# Patient Record
Sex: FEMALE | Race: BLACK OR AFRICAN AMERICAN | NOT HISPANIC OR LATINO
[De-identification: names, ages, dates, MRNs, and addresses within clinical notes are randomized per-mention and may not be internally consistent; named-entity substitution may affect disease eponyms.]

---

## 2021-10-18 ENCOUNTER — APPOINTMENT (OUTPATIENT)
Dept: PULMONOLOGY | Facility: CLINIC | Age: 71
End: 2021-10-18
Payer: MEDICARE

## 2021-10-18 VITALS
DIASTOLIC BLOOD PRESSURE: 62 MMHG | SYSTOLIC BLOOD PRESSURE: 110 MMHG | HEIGHT: 63.5 IN | HEART RATE: 86 BPM | OXYGEN SATURATION: 98 % | TEMPERATURE: 98.5 F

## 2021-10-18 DIAGNOSIS — R73.03 PREDIABETES.: ICD-10-CM

## 2021-10-18 DIAGNOSIS — Z80.1 FAMILY HISTORY OF MALIGNANT NEOPLASM OF TRACHEA, BRONCHUS AND LUNG: ICD-10-CM

## 2021-10-18 DIAGNOSIS — Z86.39 PERSONAL HISTORY OF OTHER ENDOCRINE, NUTRITIONAL AND METABOLIC DISEASE: ICD-10-CM

## 2021-10-18 PROCEDURE — 71046 X-RAY EXAM CHEST 2 VIEWS: CPT

## 2021-10-18 PROCEDURE — 99204 OFFICE O/P NEW MOD 45 MIN: CPT | Mod: 25

## 2021-10-18 RX ORDER — LEVOMEFOLATE CALCIUM 15 MG
15 TABLET ORAL
Refills: 0 | Status: ACTIVE | COMMUNITY

## 2021-10-18 RX ORDER — LEVOTHYROXINE SODIUM 137 UG/1
TABLET ORAL
Refills: 0 | Status: ACTIVE | COMMUNITY

## 2021-10-18 RX ORDER — ELECTROLYTES/DEXTROSE
SOLUTION, ORAL ORAL
Refills: 0 | Status: ACTIVE | COMMUNITY

## 2021-10-18 RX ORDER — ZOLPIDEM TARTRATE 5 MG/1
TABLET, FILM COATED ORAL
Refills: 0 | Status: ACTIVE | COMMUNITY

## 2021-10-18 RX ORDER — LIOTHYRONINE SODIUM 50 UG/1
TABLET ORAL
Refills: 0 | Status: ACTIVE | COMMUNITY

## 2021-10-18 RX ORDER — TOPIRAMATE 50 MG/1
TABLET, FILM COATED ORAL
Refills: 0 | Status: ACTIVE | COMMUNITY

## 2021-10-18 RX ORDER — METHYLPHENIDATE HYDROCHLORIDE 27 MG/1
27 TABLET, EXTENDED RELEASE ORAL
Refills: 0 | Status: ACTIVE | COMMUNITY

## 2021-10-18 RX ORDER — CLONAZEPAM 2 MG/1
TABLET ORAL
Refills: 0 | Status: ACTIVE | COMMUNITY

## 2021-10-18 RX ORDER — GABAPENTIN 300 MG/1
300 CAPSULE ORAL
Refills: 0 | Status: ACTIVE | COMMUNITY

## 2021-10-18 RX ORDER — QUETIAPINE 400 MG/1
TABLET, FILM COATED ORAL
Refills: 0 | Status: ACTIVE | COMMUNITY

## 2021-10-18 RX ORDER — LITHIUM CARBONATE 450 MG/1
TABLET ORAL
Refills: 0 | Status: ACTIVE | COMMUNITY

## 2021-10-18 RX ORDER — VENLAFAXINE HCL 50 MG
TABLET ORAL
Refills: 0 | Status: ACTIVE | COMMUNITY

## 2021-10-18 RX ORDER — METFORMIN HYDROCHLORIDE 500 MG/1
500 TABLET, COATED ORAL
Refills: 0 | Status: ACTIVE | COMMUNITY

## 2021-10-18 RX ORDER — BUPROPION HYDROCHLORIDE 100 MG/1
TABLET, FILM COATED ORAL
Refills: 0 | Status: ACTIVE | COMMUNITY

## 2021-10-25 ENCOUNTER — APPOINTMENT (OUTPATIENT)
Dept: PULMONOLOGY | Facility: CLINIC | Age: 71
End: 2021-10-25
Payer: MEDICARE

## 2021-10-25 DIAGNOSIS — R06.00 DYSPNEA, UNSPECIFIED: ICD-10-CM

## 2021-10-25 PROCEDURE — 94060 EVALUATION OF WHEEZING: CPT

## 2021-10-25 PROCEDURE — 94727 GAS DIL/WSHOT DETER LNG VOL: CPT

## 2021-10-25 PROCEDURE — 94729 DIFFUSING CAPACITY: CPT

## 2021-10-26 LAB
ALBUMIN SERPL ELPH-MCNC: 4.2 G/DL
ALP BLD-CCNC: 116 U/L
ALT SERPL-CCNC: 18 U/L
ANION GAP SERPL CALC-SCNC: 20 MMOL/L
AST SERPL-CCNC: 29 U/L
BASOPHILS # BLD AUTO: 0.02 K/UL
BASOPHILS NFR BLD AUTO: 0.2 %
BILIRUB SERPL-MCNC: 0.2 MG/DL
BUN SERPL-MCNC: 15 MG/DL
CALCIUM SERPL-MCNC: 9 MG/DL
CHLORIDE SERPL-SCNC: 106 MMOL/L
CO2 SERPL-SCNC: 14 MMOL/L
CREAT SERPL-MCNC: 0.68 MG/DL
EOSINOPHIL # BLD AUTO: 0.06 K/UL
EOSINOPHIL NFR BLD AUTO: 0.7 %
GLUCOSE SERPL-MCNC: 61 MG/DL
HCT VFR BLD CALC: 27.6 %
HGB BLD-MCNC: 6.9 G/DL
IMM GRANULOCYTES NFR BLD AUTO: 0.2 %
LYMPHOCYTES # BLD AUTO: 1.55 K/UL
LYMPHOCYTES NFR BLD AUTO: 19.2 %
MAN DIFF?: NORMAL
MCHC RBC-ENTMCNC: 18.4 PG
MCHC RBC-ENTMCNC: 25 GM/DL
MCV RBC AUTO: 73.6 FL
MONOCYTES # BLD AUTO: 0.64 K/UL
MONOCYTES NFR BLD AUTO: 7.9 %
NEUTROPHILS # BLD AUTO: 5.78 K/UL
NEUTROPHILS NFR BLD AUTO: 71.8 %
PLATELET # BLD AUTO: 527 K/UL
POTASSIUM SERPL-SCNC: 4.6 MMOL/L
PROT SERPL-MCNC: 6.9 G/DL
RBC # BLD: 3.75 M/UL
RBC # FLD: 20.6 %
SODIUM SERPL-SCNC: 140 MMOL/L
WBC # FLD AUTO: 8.07 K/UL

## 2021-10-27 ENCOUNTER — APPOINTMENT (OUTPATIENT)
Dept: CT IMAGING | Facility: HOSPITAL | Age: 71
End: 2021-10-27

## 2021-10-27 ENCOUNTER — OUTPATIENT (OUTPATIENT)
Dept: OUTPATIENT SERVICES | Facility: HOSPITAL | Age: 71
LOS: 1 days | End: 2021-10-27
Payer: MEDICARE

## 2021-10-27 ENCOUNTER — RESULT REVIEW (OUTPATIENT)
Age: 71
End: 2021-10-27

## 2021-10-27 PROCEDURE — 71250 CT THORAX DX C-: CPT | Mod: 26,MH

## 2021-10-27 PROCEDURE — 71250 CT THORAX DX C-: CPT

## 2021-11-04 NOTE — PHYSICAL EXAM
[Normal Rate/Rhythm] : normal rate/rhythm [Normal S1, S2] : normal s1, s2 [No Resp Distress] : no resp distress [Clear to Auscultation Bilaterally] : clear to auscultation bilaterally [No Clubbing] : no clubbing [TextBox_2] : sitting on rollator [TextBox_54] : regular w 3/6 SM throughout [TextBox_125] : LLE is severely excoriated and bloody [TextBox_140] : anxious, crying

## 2021-11-04 NOTE — HISTORY OF PRESENT ILLNESS
[TextBox_4] : 10/18/2021: Asked to evaluate patient by Dr Micky Ramesh for dyspnea. Here w a caretaker. Had a R THR  and readmitted for an infection requiring re-op. Course also c/b AF and started on Eliquis. She's been dyspneic since the surgery and never before. Was never a smoker, no history of lung disease. Mother smoked 3-4ppd and  of lung cancer. Saw Dr Gee Clarke, cardiology at [facility], and found that her heart is fine. Has had not lung workup. Many surgeries last 2 years, R TKR, L THR, L femur fx, and now R THR. So basically an invalid during this time - but reports that she was independent and could walk 5-6 blocks. Since this last surgery hasn't been able to be very active. This time says she can't do the walking because she can't breathe. No cough or wheeze. No chest pain. Sometimes both legs hurt. L leg is swollen. Sees Dr Selena Pearce (vascular) at [facility] who wraps her leg. Sees her weekly. Did not have Covid, is vaccinated. Tay Pink NP is her primary care practitioner. Anxiety, depression, skin picking.\par

## 2021-11-04 NOTE — CONSULT LETTER
[Dear  ___] : Dear ~YESENIA, [Courtesy Letter:] : I had the pleasure of seeing your patient, [unfilled], in my office today. [Please see my note below.] : Please see my note below. [Consult Closing:] : Thank you very much for allowing me to participate in the care of this patient.  If you have any questions, please do not hesitate to contact me. [Sincerely,] : Sincerely, [DrSravan  ___] : Dr. DICKINSON [DrSravan ___] : Dr. DICKINSON [FreeTextEntry2] : Tay Pink NP\par [facility] Call[facility]\par VIA FAX: [phone] [FreeTextEntry3] : Beba Wyatt MD, FCCP\par

## 2021-11-04 NOTE — ASSESSMENT
[FreeTextEntry1] : Data reviewed:\par \par PA/lat CXR in office 10/18/2021 : clear lungs, no effusion\par \par Impression:\par Dyspnea after R THR\par Debility\par Anxiety\par \par Plan:\par She is fully anticoagulated on Eliquis, so VTE is an unlikely explanation for her symptoms. She had AF (is regular on exam today) and has a loud murmur. She reports being told by cardiology that her heart is "fine." I wonder if this is exactly accurate. She says she's had basic labs checked though she can't remember where. It may be the case that she is simply profoundly deconditioned but she is tearful at this idea and is sure that "something is wrong." I will bring her back for a full PFT, but I'm doubtful this will reveal a treatable cause of her dyspnea.\par --\par PFT 10/25/21: normal kiran and volumes, DLCO 53%\par She has no more information than last time and again is tearful and anxious. I will go ahead and check labs and get a HRCT to exclude early/mild interstitial disease. She needs to see cardiology and she needs primary care to integrate and oversee her care.\par --\par 10/26 I spoke to ETHEL Argueta from [facility] and confirmed that they are functioning as primary care here. I let her know about the Hgb of 6.9 which is new from June and [facility] will reach out to the patient to work this up. I shared my concerns about the murmur - they have no echo - and shared results of pulmonary workup to date. I called Dr Clarke's office and requested that they fax me the echo and that Dr Clarke call me back. And I spoke to Ms Garcia to convey all of this to her.\par --\par 10/26 Bicarb is 14, another reason potentially to be dyspneic due to respiratory compensation. I called and spoke to Ellie again and will fax to her.\par --\par 10/27 Spoke to Dr Clarke, echo 8/2021 mild AS, mod LA dilatation, rec pharm nuc which was never done. Hgb was 8 at [place] in 12/2019. Will need to have primary care address anemia and metabolic acidosis. Then if still dyspneic could [place] back for stress, but risk factors for coronary disease are modest.\par --\par CT chest [facility] 10/27/21 personally reviewed :\par Air trapping, reflecting underlying small airway disease.\par Patchy groundglass opacity in the left lower lobe, nonspecific, and can be seen in sequelae of prior infectious/inflammatory process.\par Suggestion of tracheomalacia w pronounced change in caliber of trachea on exp images.\par --\par 11/1/21 Spoke to patient. I don't think TBM is the issue. Kiran is normal. The priority at this time is to work up and treat the anemia and metabolic acidosis. She wants to see a former primary, Dr Shane Landa at [address] [place] [address]. She requests that we send records to him. (She also says the pharm nuc was done at [facility].)\par --\par 11/4 Called tearful, anxious, very dyspneic, too weak to go anywhere, can't see Dr Landa till Monday. There's simply nothing I can do about this over the phone. I offered to have an ambulance bring her in if she is not safe at home and she adamantly rejected this. She needs primary care - believes she will be able to see Dr Landa right away.

## 2021-12-01 ENCOUNTER — LABORATORY RESULT (OUTPATIENT)
Age: 71
End: 2021-12-01

## 2021-12-01 ENCOUNTER — NON-APPOINTMENT (OUTPATIENT)
Age: 71
End: 2021-12-01

## 2021-12-01 ENCOUNTER — APPOINTMENT (OUTPATIENT)
Dept: HEART AND VASCULAR | Facility: CLINIC | Age: 71
End: 2021-12-01
Payer: MEDICARE

## 2021-12-01 ENCOUNTER — INPATIENT (INPATIENT)
Facility: HOSPITAL | Age: 71
LOS: 1 days | Discharge: HOME HEALTH | DRG: 603 | End: 2021-12-03
Attending: HOSPITALIST
Payer: MEDICARE

## 2021-12-01 VITALS
HEART RATE: 88 BPM | DIASTOLIC BLOOD PRESSURE: 76 MMHG | TEMPERATURE: 97.3 F | OXYGEN SATURATION: 97 % | HEIGHT: 63.5 IN | SYSTOLIC BLOOD PRESSURE: 112 MMHG

## 2021-12-01 VITALS
OXYGEN SATURATION: 99 % | WEIGHT: 190.04 LBS | DIASTOLIC BLOOD PRESSURE: 81 MMHG | HEIGHT: 69 IN | HEART RATE: 88 BPM | TEMPERATURE: 99 F | SYSTOLIC BLOOD PRESSURE: 166 MMHG | RESPIRATION RATE: 18 BRPM

## 2021-12-01 DIAGNOSIS — Z96.651 PRESENCE OF RIGHT ARTIFICIAL KNEE JOINT: Chronic | ICD-10-CM

## 2021-12-01 DIAGNOSIS — Z98.890 OTHER SPECIFIED POSTPROCEDURAL STATES: Chronic | ICD-10-CM

## 2021-12-01 DIAGNOSIS — F41.9 ANXIETY DISORDER, UNSPECIFIED: ICD-10-CM

## 2021-12-01 DIAGNOSIS — F31.9 BIPOLAR DISORDER, UNSPECIFIED: ICD-10-CM

## 2021-12-01 DIAGNOSIS — L03.119 CELLULITIS OF UNSPECIFIED PART OF LIMB: ICD-10-CM

## 2021-12-01 DIAGNOSIS — F32.A ANXIETY DISORDER, UNSPECIFIED: ICD-10-CM

## 2021-12-01 LAB
ALBUMIN SERPL ELPH-MCNC: 3.7 G/DL — SIGNIFICANT CHANGE UP (ref 3.3–5)
ALP SERPL-CCNC: 107 U/L — SIGNIFICANT CHANGE UP (ref 40–120)
ALT FLD-CCNC: 13 U/L — SIGNIFICANT CHANGE UP (ref 10–45)
ANION GAP SERPL CALC-SCNC: 10 MMOL/L — SIGNIFICANT CHANGE UP (ref 5–17)
APTT BLD: 33.1 SEC — SIGNIFICANT CHANGE UP (ref 27.5–35.5)
AST SERPL-CCNC: 21 U/L — SIGNIFICANT CHANGE UP (ref 10–40)
BASOPHILS # BLD AUTO: 0 K/UL — SIGNIFICANT CHANGE UP (ref 0–0.2)
BASOPHILS NFR BLD AUTO: 0 % — SIGNIFICANT CHANGE UP (ref 0–2)
BILIRUB SERPL-MCNC: 0.3 MG/DL — SIGNIFICANT CHANGE UP (ref 0.2–1.2)
BLD GP AB SCN SERPL QL: POSITIVE — SIGNIFICANT CHANGE UP
BUN SERPL-MCNC: 16 MG/DL — SIGNIFICANT CHANGE UP (ref 7–23)
CALCIUM SERPL-MCNC: 8.7 MG/DL — SIGNIFICANT CHANGE UP (ref 8.4–10.5)
CHLORIDE SERPL-SCNC: 107 MMOL/L — SIGNIFICANT CHANGE UP (ref 96–108)
CO2 SERPL-SCNC: 23 MMOL/L — SIGNIFICANT CHANGE UP (ref 22–31)
CREAT SERPL-MCNC: 0.63 MG/DL — SIGNIFICANT CHANGE UP (ref 0.5–1.3)
CRP SERPL-MCNC: 23.4 MG/L — HIGH (ref 0–4)
EOSINOPHIL # BLD AUTO: 0 K/UL — SIGNIFICANT CHANGE UP (ref 0–0.5)
EOSINOPHIL NFR BLD AUTO: 0 % — SIGNIFICANT CHANGE UP (ref 0–6)
GLUCOSE BLDC GLUCOMTR-MCNC: 109 MG/DL — HIGH (ref 70–99)
GLUCOSE SERPL-MCNC: 87 MG/DL — SIGNIFICANT CHANGE UP (ref 70–99)
HCT VFR BLD CALC: 26.5 % — LOW (ref 34.5–45)
HGB BLD-MCNC: 7.2 G/DL — LOW (ref 11.5–15.5)
INR BLD: 1.32 — HIGH (ref 0.88–1.16)
LYMPHOCYTES # BLD AUTO: 1.49 K/UL — SIGNIFICANT CHANGE UP (ref 1–3.3)
LYMPHOCYTES # BLD AUTO: 20.2 % — SIGNIFICANT CHANGE UP (ref 13–44)
MCHC RBC-ENTMCNC: 24.1 PG — LOW (ref 27–34)
MCHC RBC-ENTMCNC: 27.2 GM/DL — LOW (ref 32–36)
MCV RBC AUTO: 88.6 FL — SIGNIFICANT CHANGE UP (ref 80–100)
MONOCYTES # BLD AUTO: 0.39 K/UL — SIGNIFICANT CHANGE UP (ref 0–0.9)
MONOCYTES NFR BLD AUTO: 5.3 % — SIGNIFICANT CHANGE UP (ref 2–14)
NEUTROPHILS # BLD AUTO: 5.5 K/UL — SIGNIFICANT CHANGE UP (ref 1.8–7.4)
NEUTROPHILS NFR BLD AUTO: 74.5 % — SIGNIFICANT CHANGE UP (ref 43–77)
PLATELET # BLD AUTO: 592 K/UL — HIGH (ref 150–400)
POTASSIUM SERPL-MCNC: 3.8 MMOL/L — SIGNIFICANT CHANGE UP (ref 3.5–5.3)
POTASSIUM SERPL-SCNC: 3.8 MMOL/L — SIGNIFICANT CHANGE UP (ref 3.5–5.3)
PROT SERPL-MCNC: 6.9 G/DL — SIGNIFICANT CHANGE UP (ref 6–8.3)
PROTHROM AB SERPL-ACNC: 15.6 SEC — HIGH (ref 10.6–13.6)
RBC # BLD: 2.99 M/UL — LOW (ref 3.8–5.2)
RBC # FLD: 24.7 % — HIGH (ref 10.3–14.5)
RH IG SCN BLD-IMP: POSITIVE — SIGNIFICANT CHANGE UP
SARS-COV-2 RNA SPEC QL NAA+PROBE: NEGATIVE — SIGNIFICANT CHANGE UP
SODIUM SERPL-SCNC: 140 MMOL/L — SIGNIFICANT CHANGE UP (ref 135–145)
WBC # BLD: 7.38 K/UL — SIGNIFICANT CHANGE UP (ref 3.8–10.5)
WBC # FLD AUTO: 7.38 K/UL — SIGNIFICANT CHANGE UP (ref 3.8–10.5)

## 2021-12-01 PROCEDURE — 93000 ELECTROCARDIOGRAM COMPLETE: CPT

## 2021-12-01 PROCEDURE — 99204 OFFICE O/P NEW MOD 45 MIN: CPT | Mod: 25

## 2021-12-01 PROCEDURE — 99285 EMERGENCY DEPT VISIT HI MDM: CPT

## 2021-12-01 PROCEDURE — 71045 X-RAY EXAM CHEST 1 VIEW: CPT | Mod: 26

## 2021-12-01 PROCEDURE — 99223 1ST HOSP IP/OBS HIGH 75: CPT | Mod: GC

## 2021-12-01 PROCEDURE — 93010 ELECTROCARDIOGRAM REPORT: CPT

## 2021-12-01 PROCEDURE — 36415 COLL VENOUS BLD VENIPUNCTURE: CPT

## 2021-12-01 RX ORDER — POTASSIUM CHLORIDE 20 MEQ
20 PACKET (EA) ORAL ONCE
Refills: 0 | Status: DISCONTINUED | OUTPATIENT
Start: 2021-12-01 | End: 2021-12-03

## 2021-12-01 RX ORDER — LITHIUM CARBONATE 300 MG/1
150 TABLET, EXTENDED RELEASE ORAL AT BEDTIME
Refills: 0 | Status: DISCONTINUED | OUTPATIENT
Start: 2021-12-01 | End: 2021-12-03

## 2021-12-01 RX ORDER — LANOLIN ALCOHOL/MO/W.PET/CERES
10 CREAM (GRAM) TOPICAL AT BEDTIME
Refills: 0 | Status: DISCONTINUED | OUTPATIENT
Start: 2021-12-01 | End: 2021-12-03

## 2021-12-01 RX ORDER — CEFAZOLIN SODIUM 1 G
1000 VIAL (EA) INJECTION EVERY 8 HOURS
Refills: 0 | Status: DISCONTINUED | OUTPATIENT
Start: 2021-12-02 | End: 2021-12-02

## 2021-12-01 RX ORDER — QUETIAPINE FUMARATE 200 MG/1
75 TABLET, FILM COATED ORAL AT BEDTIME
Refills: 0 | Status: DISCONTINUED | OUTPATIENT
Start: 2021-12-01 | End: 2021-12-03

## 2021-12-01 RX ORDER — METHYLPHENIDATE HCL 5 MG
54 TABLET ORAL EVERY MORNING
Refills: 0 | Status: DISCONTINUED | OUTPATIENT
Start: 2021-12-01 | End: 2021-12-01

## 2021-12-01 RX ORDER — DEXTROSE 50 % IN WATER 50 %
15 SYRINGE (ML) INTRAVENOUS ONCE
Refills: 0 | Status: DISCONTINUED | OUTPATIENT
Start: 2021-12-01 | End: 2021-12-03

## 2021-12-01 RX ORDER — ASCORBIC ACID 60 MG
500 TABLET,CHEWABLE ORAL DAILY
Refills: 0 | Status: DISCONTINUED | OUTPATIENT
Start: 2021-12-01 | End: 2021-12-03

## 2021-12-01 RX ORDER — TOPIRAMATE 25 MG
75 TABLET ORAL DAILY
Refills: 0 | Status: DISCONTINUED | OUTPATIENT
Start: 2021-12-01 | End: 2021-12-03

## 2021-12-01 RX ORDER — SENNA PLUS 8.6 MG/1
2 TABLET ORAL AT BEDTIME
Refills: 0 | Status: DISCONTINUED | OUTPATIENT
Start: 2021-12-01 | End: 2021-12-03

## 2021-12-01 RX ORDER — BUPROPION HYDROCHLORIDE 150 MG/1
300 TABLET, EXTENDED RELEASE ORAL EVERY 24 HOURS
Refills: 0 | Status: DISCONTINUED | OUTPATIENT
Start: 2021-12-02 | End: 2021-12-03

## 2021-12-01 RX ORDER — DEXTROSE 50 % IN WATER 50 %
25 SYRINGE (ML) INTRAVENOUS ONCE
Refills: 0 | Status: DISCONTINUED | OUTPATIENT
Start: 2021-12-01 | End: 2021-12-03

## 2021-12-01 RX ORDER — ACETAMINOPHEN 500 MG
650 TABLET ORAL EVERY 6 HOURS
Refills: 0 | Status: DISCONTINUED | OUTPATIENT
Start: 2021-12-01 | End: 2021-12-03

## 2021-12-01 RX ORDER — FERROUS SULFATE 325(65) MG
325 TABLET ORAL DAILY
Refills: 0 | Status: DISCONTINUED | OUTPATIENT
Start: 2021-12-01 | End: 2021-12-03

## 2021-12-01 RX ORDER — VANCOMYCIN HCL 1 G
1250 VIAL (EA) INTRAVENOUS ONCE
Refills: 0 | Status: COMPLETED | OUTPATIENT
Start: 2021-12-01 | End: 2021-12-01

## 2021-12-01 RX ORDER — VENLAFAXINE HCL 75 MG
300 CAPSULE, EXT RELEASE 24 HR ORAL DAILY
Refills: 0 | Status: DISCONTINUED | OUTPATIENT
Start: 2021-12-01 | End: 2021-12-01

## 2021-12-01 RX ORDER — INSULIN LISPRO 100/ML
VIAL (ML) SUBCUTANEOUS AT BEDTIME
Refills: 0 | Status: DISCONTINUED | OUTPATIENT
Start: 2021-12-01 | End: 2021-12-03

## 2021-12-01 RX ORDER — SODIUM CHLORIDE 9 MG/ML
1000 INJECTION, SOLUTION INTRAVENOUS
Refills: 0 | Status: DISCONTINUED | OUTPATIENT
Start: 2021-12-01 | End: 2021-12-03

## 2021-12-01 RX ORDER — GLUCAGON INJECTION, SOLUTION 0.5 MG/.1ML
1 INJECTION, SOLUTION SUBCUTANEOUS ONCE
Refills: 0 | Status: DISCONTINUED | OUTPATIENT
Start: 2021-12-01 | End: 2021-12-03

## 2021-12-01 RX ORDER — LEVOTHYROXINE SODIUM 125 MCG
112 TABLET ORAL DAILY
Refills: 0 | Status: DISCONTINUED | OUTPATIENT
Start: 2021-12-01 | End: 2021-12-03

## 2021-12-01 RX ORDER — POLYETHYLENE GLYCOL 3350 17 G/17G
17 POWDER, FOR SOLUTION ORAL DAILY
Refills: 0 | Status: DISCONTINUED | OUTPATIENT
Start: 2021-12-01 | End: 2021-12-03

## 2021-12-01 RX ORDER — CLONAZEPAM 1 MG
3 TABLET ORAL AT BEDTIME
Refills: 0 | Status: DISCONTINUED | OUTPATIENT
Start: 2021-12-01 | End: 2021-12-03

## 2021-12-01 RX ORDER — METFORMIN HYDROCHLORIDE 850 MG/1
500 TABLET ORAL
Refills: 0 | Status: DISCONTINUED | OUTPATIENT
Start: 2021-12-01 | End: 2021-12-01

## 2021-12-01 RX ADMIN — QUETIAPINE FUMARATE 75 MILLIGRAM(S): 200 TABLET, FILM COATED ORAL at 22:04

## 2021-12-01 RX ADMIN — LITHIUM CARBONATE 150 MILLIGRAM(S): 300 TABLET, EXTENDED RELEASE ORAL at 22:43

## 2021-12-01 RX ADMIN — Medication 250 MILLIGRAM(S): at 20:00

## 2021-12-01 RX ADMIN — Medication 3 MILLIGRAM(S): at 22:43

## 2021-12-01 RX ADMIN — Medication 10 MILLIGRAM(S): at 22:43

## 2021-12-01 NOTE — ED ADULT NURSE NOTE - CHPI ED NUR SYMPTOMS NEG
no back pain/no bruising/no deformity/no difficulty bearing weight/no fever/no numbness/no stiffness/no tingling/no weakness

## 2021-12-01 NOTE — H&P ADULT - NSICDXPASTMEDICALHX_GEN_ALL_CORE_FT
PAST MEDICAL HISTORY:  Anxiety     Aortic stenosis     Bipolar disorder     Cellulitis of lower limb LLE cellulitis    DVT, lower extremity provoked after hip surgery    Excoriation (skin-picking) disorder     HTN (hypertension)     Hypothyroidism     Major depression, chronic     Pre-diabetes

## 2021-12-01 NOTE — ASSESSMENT
[FreeTextEntry1] : Patient with multiple problems first she had a hip replacement followed by atrial fibrillation and a possible DVT she was placed on Eliquis and has become severely anemic patient was seen at [place] [facility] was given a unit of blood and the Eliquis was stopped patient has a severe cellulitis with oozing skin lesions patient has mild aortic stenosis patient has an agitated depression patient is on multiple medications patient is being sent to the emergency emergency room for admission and work-up of her anemia her infection and her shortness of breath patient patient has a normal EKG report from pulmonary that Dr. Menard done at [facility] said she might use it she had mild aortic stenosis but would suggest but would suggest repeat of echocardiogram

## 2021-12-01 NOTE — H&P ADULT - ASSESSMENT
71F with a h/o Bipolar, excoriation disorder, HTN, preDM, aortic stenosis, hypothyroidism (on synthroid and cytomel), hx of right hip surgery c/b infection and DVT (previously on Eliquis but d/c'd i/s/o anemia), anemia (s/p blood transfusion at [facility] where she gets most of her care and reportedly left AMA), LLE cellulitis (recently hospitalized and d/c'd on keflex and another abx-completed 2 days ago) presents today from cardiologist, Dr. Booker's office, where she follows for wound care and unna boot for worsening and unhealing LLE cellulitis.  71F with a h/o Bipolar, excoriation disorder, HTN, preDM, aortic stenosis (unclear past finding), hypothyroidism (on synthroid and cytomel), hx of right hip surgery c/b infection, Afib, and DVT (previously on Eliquis but d/c'd i/s/o anemia), anemia (s/p blood transfusion at [facility] where she gets most of her care and reportedly left AMA), LLE cellulitis (recently hospitalized and d/c'd on keflex and another abx-completed 2 days ago) presents today from cardiologist, Dr. Booker's office, where she follows for wound care and unna boot for worsening and unhealing LLE wound and infection admitted for management of cellulitis.

## 2021-12-01 NOTE — H&P ADULT - PROBLEM SELECTOR PLAN 3
Patient with extensive psychiatric history, on multiple medications. Does not see a behavioral health specialist because she says she can't afford it but has a psychopharmacologist who prescribes her medications.  - C/w home wellbutrin 300mg qd in AM, Topiramate 75mg qd, Seroquel 75mg qhs, Lithium 150mg qhs, Klonopin 3mg qhs  - F/u AM lithium and topiramate level  - Patient also takes methylphenidate for ADHD and Effexor for depression- confirm dose with pharmacy ([facility] [place] [phone])    #MDD  -See above  #Anxiety Disorder  - See above  #ADHD  - See above Patient with skin picking disorder. Multiple excoriations over face, arms, abdomen, legs, and hips. Uncontrolled. Patient does not see a therapist and only manages with medications prescribed by psychopharmacologist.  - C/w home psych meds  - Inpatient psych consult

## 2021-12-01 NOTE — REASON FOR VISIT
[FreeTextEntry1] : Patient is a 71-year-old woman who here for an evaluation of shortness of breath patient has been evaluated by cardiology at [facility] and told she was doing okay patient had hip surgery in August patient had a DVT and was placed on Eliquis patient has had a hemoglobin of 6.6 since October 27 patient was started on iron supplements meds patient went to [facility] received a transfusion and signed out AGAINST MEDICAL ADVICE patient today is not complaining of chest pain or shortness of breath but has a severely infected left lower extremity the last lab work on the patient patient was November 10 which showed a hemoglobin of 6.9 and an iron of 4 patient has lesions on her body from scratching that are bleeding patient is here with is here with a caretaker and is noted to have a bipolar disorder patient has been seen by pulmonary that have no etiology for her shortness of breath patient was seen by Dr. Jackson at [facility] echo from August August 2021 shows mildly well aortic stenosis moderate left atrial dilatation a hemoglobin going back to 12/20/2019 was a she had a CAT scan of her chest on on 1027 that showed air trapping and patchy groundglass opacities in the left lower lobe and nonspecific she has tracheomalacia her spirometry was normal patient was to see was to see Dr. Landa

## 2021-12-01 NOTE — REVIEW OF SYSTEMS
[Feeling Fatigued] : feeling fatigued [Dyspnea on exertion] : dyspnea during exertion [Depression] : depression [de-identified] : left leg infection

## 2021-12-01 NOTE — H&P ADULT - PROBLEM SELECTOR PLAN 8
Patient with history of paroxysmal afib after hip arthroplasty in the past. Post-op course complicated by infection and afib. Previously took Eliquis but discontinued in the setting of anemia and unknown source.   - Get further collateral from cardiologist  - EKG NSR, will get repeat EKG if patient feels palpitations or unwell Questionable history in previous note about aortic stenosis. Patient unaware of diagnosis.   - Likely will need repeat outpatient TTE

## 2021-12-01 NOTE — H&P ADULT - PROBLEM SELECTOR PLAN 2
Patient with skin picking disorder. Multiple excoriations over face, arms, abdomen, legs, and hips. Uncontrolled. Patient does not see a therapist and only manages with medications prescribed by psychopharmacologist.  - C/w home psych meds  - Inpatient psych consult Hgb on admission 7.2 , currently no signs of active bleeding (no hematochezia, melena, hemoptysis, hematuria). Patient says that her baseline currently is in the 8 range. In 6/2021 hgb was 11 and started falling off since. Patient states that workup hasn't found a source, however most recent colonoscopy and EGD was done 2 years ago. Patient takes daily iron supplement  - obtain iron panel  - trend CBC  - maintain active T&S  - transfuse if Hgb <7   - Started on protonix 40 qd  - F/u FOBT

## 2021-12-01 NOTE — H&P ADULT - PROBLEM SELECTOR PLAN 4
Patient with hypothyroidism on synthroid and cytomel. Patient says she feels as though her synthroid dose needs to be adjusted because she has been gaining weight despite not having an appetite. Has not seen PCP in few months. She also takes cytomel for her hypothyroidism which she says her doctor said has side effects that help with depression.   - C/w home synthroid 112mcg qam   - F/u TSH Patient with extensive psychiatric history, on multiple medications. Does not see a behavioral health specialist because she says she can't afford it but has a psychopharmacologist who prescribes her medications.  - C/w home wellbutrin 300mg qd in AM, Topiramate 75mg qd, Seroquel 75mg qhs, Lithium 150mg qhs, Klonopin 3mg qhs  - F/u AM lithium and topiramate level  - Patient also takes methylphenidate for ADHD and Effexor for depression- confirm dose with pharmacy ([facility] [place] [phone])    #MDD  -See above  #Anxiety Disorder  - See above  #ADHD  - See above

## 2021-12-01 NOTE — H&P ADULT - PROBLEM SELECTOR PLAN 5
Patient with history of provoked DVT after hip surgery in the past. Was on Eliquis after that but Eliquis was discontinued after she became anemic for risk of possible bleeding.   - Can consider b/l dopplers to assess for DVTs Patient with hypothyroidism on synthroid and cytomel. Patient says she feels as though her synthroid dose needs to be adjusted because she has been gaining weight despite not having an appetite. Has not seen PCP in few months. She also takes cytomel for her hypothyroidism which she says her doctor said has side effects that help with depression.   - C/w home synthroid 112mcg qam   - F/u TSH

## 2021-12-01 NOTE — H&P ADULT - PROBLEM SELECTOR PLAN 1
Previously treated with Keflex and another abx (patient can't remember which). Completed abx course 2 days ago. Sent in to ED by outpatient provider for continued infection of LLE. No leukocytosis, fever. Previously treated with Keflex and another abx (patient can't remember which). Completed abx course 2 days ago. Sent in to ED by outpatient provider for continued infection of LLE. No leukocytosis, fever. On exam, leg is tender, erythematous with multiple excoriations and wounds; no purulence  - S/p Vancomycin 1250mg x1 in ED  - Started cefazolin 1g q8h   - Wound care consult Previously treated with Keflex and another abx (patient can't remember which). Completed abx course 2 days ago. Sent in to ED by outpatient provider for continued infection of LLE. No leukocytosis, fever. On exam, leg is tender, erythematous with multiple excoriations and wounds; no purulence.   - S/p Vancomycin 1250mg x1 in ED  - Started cefazolin 1g q8h   - Wound care consult Previously treated with Keflex and another abx (patient can't remember which). Completed abx course 2 days ago. Sent in to ED by outpatient provider for continued infection of LLE. No leukocytosis, fever. On exam, leg is tender, erythematous with multiple excoriations and wounds; purulence reported by patient.   - S/p Vancomycin 1250mg x1 in ED  - C/w Vanc 1250mg q12h  - F/u vanc trough 12/3  - Wound care consult

## 2021-12-01 NOTE — ED PROVIDER NOTE - CLINICAL SUMMARY MEDICAL DECISION MAKING FREE TEXT BOX
71F with a h/o Bipolar, neurotic picking disorder, HTN, preDM, aortic stenosis, hx of right hip surgery c/b infection and DVT, was on eliquis but stopped recently due to anemia requiring blood transfusion (was admitted to [facility] where she gets most of her care and reportedly left AMA), LLE infection being treated with keflex and another abx (pt cannot recall name), completed course 2 days ago with no improvement, has been followed by a cardiovascular doctor at [facility] for wound care and unna boot who presented to Dr. Booker as an outpt today and was sent to ER for severely infected LLE. Pt admits she has been picking at the leg, she denies f/c, no CP/SOB currently, she denies bleeding anywhere or bloody stool, reports her stool is always dark as she is on iron. Reports normal EGD/colo 1-2 years ago. Pt is tearful and weepy about the fact that she has to bee in the hospital, denies Si/HI or hallucinations.   VSS, EKG NSR with no ischemia, no focal neuro deficits, exam as noted, purulent cellulitis to LLE, plan for labs including Bcx and T&S, IV abx, pt will require admission, stable fro [facility] at this time.

## 2021-12-01 NOTE — H&P ADULT - NSHPPHYSICALEXAM_GEN_ALL_CORE
.  VITAL SIGNS:  T(C): 37.2 (12-01-21 @ 17:03), Max: 37.2 (12-01-21 @ 17:03)  T(F): 98.9 (12-01-21 @ 17:03), Max: 98.9 (12-01-21 @ 17:03)  HR: 88 (12-01-21 @ 17:03) (88 - 88)  BP: 166/81 (12-01-21 @ 17:03) (166/81 - 166/81)  BP(mean): --  RR: 18 (12-01-21 @ 17:03) (18 - 18)  SpO2: 99% (12-01-21 @ 17:03) (99% - 99%)  Wt(kg): --    PHYSICAL EXAM:    Constitutional: Comfortable in bed but tearful.   Eyes: PERRL, EOMI, anicteric sclera  ENT: no nasal discharge; uvula midline, no oropharyngeal erythema or exudates; MMM  Neck: supple; no JVD or thyromegaly  Respiratory: CTA B/L; no W/R/R, no retractions  Cardiac: +S1/S2; RRR; crescendo-decrescendo murmur  Gastrointestinal: abdomen soft, NT/ND; no rebound or guarding; +BSx4  Back: spine midline, no bony tenderness or step-offs; no CVAT B/L  Extremities: Multiple wounds over b/l LE, L > R. Open bandage on LLE. Unkempt feet with black, dry skin and unkempt long toenails. LLE largest wound, about 8 inches long. Erythematous to few inches below knee. Warm to touch.   Musculoskeletal: NROM x4; no joint swelling, tenderness or erythema  Vascular: 2+ radial, femoral, DP/PT pulses B/L  Dermatologic: multiple excoriations over skin- over right eyebrow, right cheek, right arm, abdomen, right hip.   Neurologic: AAOx3; CNII-XII grossly intact; no focal deficits  Psychiatric: tearful, depressed, anxious, picks at skin.

## 2021-12-01 NOTE — H&P ADULT - HISTORY OF PRESENT ILLNESS
71F with a h/o Bipolar, excoriation disorder, HTN, preDM, aortic stenosis, hypothyroidism (on synthroid and cytomel), hx of right hip surgery c/b infection and DVT (previously on Eliquis but d/c'd i/s/o anemia), anemia (s/p blood transfusion at [facility] where she gets most of her care and reportedly left AMA), LLE cellulitis (recently hospitalized and d/c'd on keflex and another abx-completed 2 days ago) presents today from cardiologist, Dr. Booker's office, where she follows for wound care and unna boot. Patient states that the antibiotic course did not help her infected LLE. She is compliant with all her medications. She admits that she has been picking at her skin all over her body and unless the leg is wrapped up, she picks at the skin there too. Denies any fevers, chills, nausea, vomiting, headache, diarrhea, constipation.     Has been anemic for few years. Few weeks ago, hgb was 7. Denies any signs of active bleeding and says that she is being worked up for the anemia but nobody has found any source yet. Last colonoscopy and EGD 2 years ago was normal. Denies melena, hematochezia but says her stool is usually darker because she takes daily iron.      71F with a h/o Bipolar, excoriation disorder, HTN, preDM, aortic stenosis, hypothyroidism (on synthroid and cytomel), hx of right hip surgery c/b infection and DVT (previously on Eliquis but d/c'd i/s/o anemia), anemia (s/p blood transfusion at [facility] where she gets most of her care and reportedly left AMA), LLE cellulitis (recently hospitalized and d/c'd on keflex and another abx-completed 2 days ago) presents today from cardiologist, Dr. Booker's office, where she follows for wound care and unna boot for worsening and unhealing LLE cellulitis. Patient states that the antibiotic course did not help her infected LLE. She is compliant with all her medications. She admits that she has been picking at her skin all over her body and unless the leg is wrapped up, she picks at the skin there too. Denies any fevers, chills, nausea, vomiting, headache, diarrhea, constipation. Has been anemic for few years. Few weeks ago, hgb was 7. Denies any signs of active bleeding and says that she is being worked up for the anemia but nobody has found any source yet. Last colonoscopy and EGD 2 years ago was normal. Denies melena, hematochezia but says her stool is usually darker because she takes daily iron. Patient extremely tearful and anxious and says that she misses her cats and feels as though she is unstable right now because she is unable to stop picking at her skin. Denies any SI/HI. Does not want to speak to a psychologist.      71F with a h/o Bipolar, excoriation disorder, HTN, preDM, aortic stenosis, hypothyroidism (on synthroid and cytomel), hx of right hip surgery c/b infection and DVT (previously on Eliquis but d/c'd i/s/o anemia), anemia (s/p blood transfusion at [facility] where she gets most of her care and reportedly left AMA), LLE cellulitis (recently hospitalized and d/c'd on keflex and another abx-completed 2 days ago) presents today from cardiologist, Dr. Booker's office, where she follows for wound care and unna boot for worsening and unhealing LLE cellulitis. Patient states that the antibiotic course did not help her infected LLE. She is compliant with all her medications. She admits that she has been picking at her skin all over her body and unless the leg is wrapped up, she picks at the skin there too. Denies any fevers, chills, nausea, vomiting, headache, diarrhea, constipation. Has been anemic for few years. Few weeks ago, hgb was 7. Denies any signs of active bleeding and says that she is being worked up for the anemia but nobody has found any source yet. Last colonoscopy and EGD 2 years ago was normal. Denies melena, hematochezia but says her stool is usually darker because she takes daily iron. Patient extremely tearful and anxious and says that she misses her cats and feels as though she is unstable right now because she is unable to stop picking at her skin. Denies any SI/HI. Does not want to speak to a psychologist.     In the ED:  Initial vital signs: T: 98.9 F, HR: 88, BP: 166/81, R: 18, SpO2: 99% on RA  ED course:   Labs: significant for H/H 7.2/26.5,   Imaging:  CXR:   EKG:   Medications:   Consults: none  71F with a h/o Bipolar, excoriation disorder, HTN, preDM, aortic stenosis, hypothyroidism (on synthroid and cytomel), hx of right hip surgery c/b infection and DVT (previously on Eliquis but d/c'd i/s/o anemia), anemia (s/p blood transfusion at [facility] where she gets most of her care and reportedly left AMA), LLE cellulitis (recently hospitalized and d/c'd on keflex and another abx-completed 2 days ago) presents today from cardiologist, Dr. Booker's office, where she follows for wound care and unna boot for worsening and unhealing LLE cellulitis. Patient states that the antibiotic course did not help her infected LLE. She is compliant with all her medications. She admits that she has been picking at her skin all over her body and unless the leg is wrapped up, she picks at the skin there too. Denies any fevers, chills, nausea, vomiting, headache, diarrhea, constipation. Has been anemic for few years. Few weeks ago, hgb was 7. Denies any signs of active bleeding and says that she is being worked up for the anemia but nobody has found any source yet. Last colonoscopy and EGD 2 years ago was normal. Denies melena, hematochezia but says her stool is usually darker because she takes daily iron. Patient extremely tearful and anxious and says that she misses her cats and feels as though she is unstable right now because she is unable to stop picking at her skin. Denies any SI/HI. Does not want to speak to a psychologist.     In the ED:  Initial vital signs: T: 98.9 F, HR: 88, BP: 166/81, R: 18, SpO2: 99% on RA  ED course:   Labs: significant for H/H 7.2/26.5  Imaging:  CXR: slight vascular congestion  EKG: NSR,   Medications: vancomycin 1250mg x1  Consults: none  71F with a h/o Bipolar, excoriation disorder, HTN, preDM, aortic stenosis (unclear past finding), hypothyroidism (on synthroid and cytomel), hx of right hip surgery c/b infection, Afib, and DVT (previously on Eliquis but d/c'd i/s/o anemia), anemia (s/p blood transfusion at [facility] where she gets most of her care and reportedly left AMA), LLE cellulitis (recently hospitalized and d/c'd on keflex and another abx-completed 2 days ago) presents today from cardiologist, Dr. Booker's office, where she follows for wound care and unna boot for worsening and unhealing LLE cellulitis. Patient states that the antibiotic course did not help her infected LLE. She is compliant with all her medications. She admits that she has been picking at her skin all over her body and unless the leg is wrapped up, she picks at the skin there too. Denies any fevers, chills, nausea, vomiting, headache, diarrhea, constipation. Has been anemic for few years. Few weeks ago, hgb was 7. Denies any signs of active bleeding and says that she is being worked up for the anemia but nobody has found any source yet. Last colonoscopy and EGD 2 years ago was normal. Denies melena, hematochezia but says her stool is usually darker because she takes daily iron. Patient extremely tearful and anxious and says that she misses her cats and feels as though she is unstable right now because she is unable to stop picking at her skin. Denies any SI/HI. Does not want to speak to a psychologist.     In the ED:  Initial vital signs: T: 98.9 F, HR: 88, BP: 166/81, R: 18, SpO2: 99% on RA  ED course:   Labs: significant for H/H 7.2/26.5  Imaging:  CXR: slight vascular congestion  EKG: NSR,   Medications: vancomycin 1250mg x1  Consults: none

## 2021-12-01 NOTE — ED PROVIDER NOTE - OBJECTIVE STATEMENT
71F with a h/o Bipolar, neurotic picking disorder, HTN, preDM, aortic stenosis, hx of right hip surgery c/b infection and DVT, was on eliquis but stopped recently due to anemia requiring blood transfusion (was admitted to [facility] where she gets most of her care and reportedly left AMA), LLE infection being treated with keflex and another abx (pt cannot recall name), completed course 2 days ago with no improvement, has been followed by a cardiovascular doctor at [facility] for wound care and unna boot who presented to Dr. Booker as an outpt today and was sent to ER for severely infected LLE. Pt admits she has been picking at the leg, she denies f/c, no CP/SOB currently, she denies bleeding anywhere or bloody stool, reports her stool is always dark as she is on iron. Reports normal EGD/colo 1-2 years ago. Pt is tearful and weepy about the fact that she has to bee in the hospital, denies Si/HI or hallucinations.

## 2021-12-01 NOTE — H&P ADULT - NSHPSOCIALHISTORY_GEN_ALL_CORE
Lives alone in "big apartment" with 3 cats.   Has HHA that she pays for out of pocket.   Drinks a glass of wine with dinner every night (except for lately because she was on abx)  Denies drugs/smoking.   Never , no children.   Has friends that she sees occasionally.

## 2021-12-01 NOTE — H&P ADULT - PROBLEM SELECTOR PLAN 6
Patient diagnosed with pre-DM. Unknown last A1c. On Metformin 500mg bid at home.   - mISS and carb consistent diet  - F/u AM hbA1C Patient with history of provoked DVT after hip surgery in the past. Was on Eliquis after that but Eliquis was discontinued after she became anemic for risk of possible bleeding.   - Can consider b/l dopplers to assess for DVTs

## 2021-12-01 NOTE — H&P ADULT - NSHPLABSRESULTS_GEN_ALL_CORE
.  LABS:                         7.2    7.38  )-----------( 592      ( 01 Dec 2021 17:46 )             26.5     12-01    140  |  107  |  16  ----------------------------<  87  3.8   |  23  |  0.63    Ca    8.7      01 Dec 2021 17:47    TPro  6.9  /  Alb  3.7  /  TBili  0.3  /  DBili  x   /  AST  21  /  ALT  13  /  AlkPhos  107  12-01                  RADIOLOGY, EKG & ADDITIONAL TESTS: Reviewed.

## 2021-12-01 NOTE — H&P ADULT - NSICDXPASTSURGICALHX_GEN_ALL_CORE_FT
PAST SURGICAL HISTORY:  History of arthroplasty of left hip     History of arthroplasty of right hip     S/P total knee replacement, right

## 2021-12-01 NOTE — H&P ADULT - PROBLEM SELECTOR PLAN 9
Patient without bowel movement in few days.   - Started on miralax 17g qd and senna 2tab qhs Patient with history of paroxysmal afib after hip arthroplasty in the past. Post-op course complicated by infection and afib. Previously took Eliquis but discontinued in the setting of anemia and unknown source.   - Get further collateral from cardiologist  - EKG NSR, will get repeat EKG if patient feels palpitations or unwell

## 2021-12-01 NOTE — H&P ADULT - PROBLEM SELECTOR PLAN 11
F: None  E: Replete PRN  N: DASH/TLC diet  GI PPx: None  DVT PPx: SCDs, holding AC i/s/o anemia  Dispo: [facility]

## 2021-12-01 NOTE — H&P ADULT - PROBLEM SELECTOR PLAN 10
F: None  E: Replete PRN  N: DASH/TLC diet  GI PPx: None  DVT PPx: SCDs, holding AC i/s/o anemia  Dispo: [facility] Patient without bowel movement in few days.   - Started on miralax 17g qd and senna 2tab qhs

## 2021-12-01 NOTE — H&P ADULT - ATTENDING COMMENTS
#Cellulitis: Presents with LLE pain/erythema, purulence, not improved w/ PO keflex and sent in by PCP; non septic, no signs of nec fasc, no fluctuance; +multiple wounds, excoriations, recurrent cellulitis likely 2/2 neurotic excoriation disorder- s/p Vanc in ED; can continue for purulent cellulitis, consider additional gram neg coverage if not improved. Unclear if patient failed PO abx or has recurrent cellulitis due to severe excoriation disorder.  Wound care consult, consider plastics, collateral in AM; Psych eval      #Bipolar/Neurotic excoriation: c/w home meds; collateral from outpt psychiatrist. Psych consult in AM if patient amenable. Denies SI. Monitor qtc

## 2021-12-01 NOTE — ED PROVIDER NOTE - PHYSICAL EXAMINATION
GEN: Elderly, chronically ill appearing, obese, well developed, awake, alert, oriented to person, place, time/situation anxious, otherwise in no apparent distress. NTAF  ENT: Airway patent, Nasal mucosa clear. Mouth with normal mucosa.  EYES: Clear bilaterally. PERRL, EOMI  RESPIRATORY: Breathing comfortably with normal RR. No W/C/R, no hypoxia or resp distress.  CARDIAC: Regular rate and rhythm, no M/R/G  ABDOMEN: Soft, nontender, +bowel sounds, no rebound, rigidity, or guarding.  MSK: b/l LE edema, compartments soft, anterior left leg with multiple open wounds and purulent cellulitis, FROM but limited 2/2 pain, distal pulses present.   NEURO: Alert and oriented, no focal deficits.  SKIN: Skin color pale for race, anterior left leg with multiple open wounds and purulent cellulitis, no crepitus or gangrene  PSYCH: Alert and oriented to person, place, time/situation. anxious mood and affect. no apparent risk to self or others.

## 2021-12-01 NOTE — ED ADULT TRIAGE NOTE - CHIEF COMPLAINT QUOTE
Pt BIBA from PCP office for low hgb and infected LLE. Pt had hgb of 6.9 drawn Nov 30th, denies dizziness, CP, or light headedness. Pt has hx of blood transfusion, on iron supplements.  Pt finished PO abx for left foot infection, states "its not getting any better." Tearful in triage, pt upset she has to be in the hospital.

## 2021-12-01 NOTE — H&P ADULT - PROBLEM SELECTOR PLAN 7
Questionable history in previous note about aortic stenosis. Patient unaware of diagnosis.   - Likely will need repeat outpatient TTE Patient diagnosed with pre-DM. Unknown last A1c. On Metformin 500mg bid at home.   - mISS and carb consistent diet  - F/u AM hbA1C

## 2021-12-02 ENCOUNTER — TRANSCRIPTION ENCOUNTER (OUTPATIENT)
Age: 71
End: 2021-12-02

## 2021-12-02 DIAGNOSIS — K59.00 CONSTIPATION, UNSPECIFIED: ICD-10-CM

## 2021-12-02 DIAGNOSIS — Z29.9 ENCOUNTER FOR PROPHYLACTIC MEASURES, UNSPECIFIED: ICD-10-CM

## 2021-12-02 DIAGNOSIS — E03.9 HYPOTHYROIDISM, UNSPECIFIED: ICD-10-CM

## 2021-12-02 DIAGNOSIS — I48.0 PAROXYSMAL ATRIAL FIBRILLATION: ICD-10-CM

## 2021-12-02 DIAGNOSIS — I82.409 ACUTE EMBOLISM AND THROMBOSIS OF UNSPECIFIED DEEP VEINS OF UNSPECIFIED LOWER EXTREMITY: ICD-10-CM

## 2021-12-02 DIAGNOSIS — R73.03 PREDIABETES: ICD-10-CM

## 2021-12-02 DIAGNOSIS — F42.4 EXCORIATION (SKIN-PICKING) DISORDER: ICD-10-CM

## 2021-12-02 DIAGNOSIS — I35.0 NONRHEUMATIC AORTIC (VALVE) STENOSIS: ICD-10-CM

## 2021-12-02 DIAGNOSIS — D64.9 ANEMIA, UNSPECIFIED: ICD-10-CM

## 2021-12-02 LAB
A1C WITH ESTIMATED AVERAGE GLUCOSE RESULT: <4.2 % — SIGNIFICANT CHANGE UP (ref 4–5.6)
ALBUMIN SERPL ELPH-MCNC: 3.5 G/DL — SIGNIFICANT CHANGE UP (ref 3.3–5)
ALBUMIN SERPL ELPH-MCNC: 4 G/DL
ALP BLD-CCNC: 113 U/L
ALP SERPL-CCNC: 95 U/L — SIGNIFICANT CHANGE UP (ref 40–120)
ALT FLD-CCNC: 12 U/L — SIGNIFICANT CHANGE UP (ref 10–45)
ALT SERPL-CCNC: 16 U/L
ANION GAP SERPL CALC-SCNC: 11 MMOL/L
ANION GAP SERPL CALC-SCNC: 9 MMOL/L — SIGNIFICANT CHANGE UP (ref 5–17)
AST SERPL-CCNC: 15 U/L — SIGNIFICANT CHANGE UP (ref 10–40)
AST SERPL-CCNC: 19 U/L
BASOPHILS # BLD AUTO: 0.01 K/UL — SIGNIFICANT CHANGE UP (ref 0–0.2)
BASOPHILS # BLD AUTO: 0.02 K/UL
BASOPHILS NFR BLD AUTO: 0.2 % — SIGNIFICANT CHANGE UP (ref 0–2)
BASOPHILS NFR BLD AUTO: 0.3 %
BILIRUB SERPL-MCNC: 0.3 MG/DL
BILIRUB SERPL-MCNC: 0.3 MG/DL — SIGNIFICANT CHANGE UP (ref 0.2–1.2)
BLD GP AB SCN SERPL QL: POSITIVE — SIGNIFICANT CHANGE UP
BLD GP AB SCN SERPL QL: POSITIVE — SIGNIFICANT CHANGE UP
BUN SERPL-MCNC: 11 MG/DL — SIGNIFICANT CHANGE UP (ref 7–23)
BUN SERPL-MCNC: 18 MG/DL
CALCIUM SERPL-MCNC: 8.3 MG/DL — LOW (ref 8.4–10.5)
CALCIUM SERPL-MCNC: 8.9 MG/DL
CHLORIDE SERPL-SCNC: 107 MMOL/L
CHLORIDE SERPL-SCNC: 108 MMOL/L — SIGNIFICANT CHANGE UP (ref 96–108)
CHOLEST SERPL-MCNC: 129 MG/DL — SIGNIFICANT CHANGE UP
CHOLEST SERPL-MCNC: 137 MG/DL
CK SERPL-CCNC: 47 U/L
CO2 SERPL-SCNC: 24 MMOL/L
CO2 SERPL-SCNC: 25 MMOL/L — SIGNIFICANT CHANGE UP (ref 22–31)
CREAT SERPL-MCNC: 0.68 MG/DL
CREAT SERPL-MCNC: 0.69 MG/DL — SIGNIFICANT CHANGE UP (ref 0.5–1.3)
EOSINOPHIL # BLD AUTO: 0.12 K/UL
EOSINOPHIL # BLD AUTO: 0.18 K/UL — SIGNIFICANT CHANGE UP (ref 0–0.5)
EOSINOPHIL NFR BLD AUTO: 1.7 %
EOSINOPHIL NFR BLD AUTO: 2.7 % — SIGNIFICANT CHANGE UP (ref 0–6)
ERYTHROCYTE [SEDIMENTATION RATE] IN BLOOD BY WESTERGREN METHOD: 93 MM/HR
ERYTHROCYTE [SEDIMENTATION RATE] IN BLOOD: 121 MM/HR — HIGH
ESTIMATED AVERAGE GLUCOSE: 77 MG/DL
ESTIMATED AVERAGE GLUCOSE: <74 MG/DL — SIGNIFICANT CHANGE UP (ref 68–114)
FERRITIN SERPL-MCNC: 20 NG/ML — SIGNIFICANT CHANGE UP (ref 15–150)
FERRITIN SERPL-MCNC: 22 NG/ML
GLUCOSE BLDC GLUCOMTR-MCNC: 106 MG/DL — HIGH (ref 70–99)
GLUCOSE SERPL-MCNC: 113 MG/DL — HIGH (ref 70–99)
GLUCOSE SERPL-MCNC: 90 MG/DL
HBA1C MFR BLD HPLC: 4.3 %
HCT VFR BLD CALC: 25.9 % — LOW (ref 34.5–45)
HCT VFR BLD CALC: 29 % — LOW (ref 34.5–45)
HCT VFR BLD CALC: 29.7 %
HCV AB S/CO SERPL IA: 0.04 S/CO — SIGNIFICANT CHANGE UP
HCV AB SERPL-IMP: SIGNIFICANT CHANGE UP
HDLC SERPL-MCNC: 42 MG/DL — LOW
HDLC SERPL-MCNC: 49 MG/DL
HGB BLD-MCNC: 6.8 G/DL — CRITICAL LOW (ref 11.5–15.5)
HGB BLD-MCNC: 7.6 G/DL
HGB BLD-MCNC: 7.8 G/DL — LOW (ref 11.5–15.5)
IMM GRANULOCYTES NFR BLD AUTO: 0.3 %
IMM GRANULOCYTES NFR BLD AUTO: 0.6 % — SIGNIFICANT CHANGE UP (ref 0–1.5)
IRON SATN MFR SERPL: 17 UG/DL — LOW (ref 30–160)
IRON SATN MFR SERPL: 5 %
IRON SATN MFR SERPL: 6 % — LOW (ref 14–50)
IRON SERPL-MCNC: 17 UG/DL
LDLC SERPL CALC-MCNC: 70 MG/DL
LIPID PNL WITH DIRECT LDL SERPL: 69 MG/DL — SIGNIFICANT CHANGE UP
LITHIUM SERPL-MCNC: 0.1 MMOL/L — LOW (ref 0.6–1.2)
LYMPHOCYTES # BLD AUTO: 0.79 K/UL — LOW (ref 1–3.3)
LYMPHOCYTES # BLD AUTO: 1.41 K/UL
LYMPHOCYTES # BLD AUTO: 12.1 % — LOW (ref 13–44)
LYMPHOCYTES NFR BLD AUTO: 19.9 %
MAN DIFF?: NORMAL
MCHC RBC-ENTMCNC: 23.4 PG — LOW (ref 27–34)
MCHC RBC-ENTMCNC: 23.9 PG
MCHC RBC-ENTMCNC: 24.5 PG — LOW (ref 27–34)
MCHC RBC-ENTMCNC: 25.6 GM/DL
MCHC RBC-ENTMCNC: 26.3 GM/DL — LOW (ref 32–36)
MCHC RBC-ENTMCNC: 26.9 GM/DL — LOW (ref 32–36)
MCV RBC AUTO: 89.3 FL — SIGNIFICANT CHANGE UP (ref 80–100)
MCV RBC AUTO: 91.2 FL — SIGNIFICANT CHANGE UP (ref 80–100)
MCV RBC AUTO: 93.4 FL
MONOCYTES # BLD AUTO: 0.47 K/UL — SIGNIFICANT CHANGE UP (ref 0–0.9)
MONOCYTES # BLD AUTO: 0.7 K/UL
MONOCYTES NFR BLD AUTO: 7.2 % — SIGNIFICANT CHANGE UP (ref 2–14)
MONOCYTES NFR BLD AUTO: 9.9 %
NEUTROPHILS # BLD AUTO: 4.83 K/UL
NEUTROPHILS # BLD AUTO: 5.06 K/UL — SIGNIFICANT CHANGE UP (ref 1.8–7.4)
NEUTROPHILS NFR BLD AUTO: 67.9 %
NEUTROPHILS NFR BLD AUTO: 77.2 % — HIGH (ref 43–77)
NON HDL CHOLESTEROL: 87 MG/DL — SIGNIFICANT CHANGE UP
NONHDLC SERPL-MCNC: 88 MG/DL
NRBC # BLD: 0 /100 WBCS — SIGNIFICANT CHANGE UP (ref 0–0)
NRBC # BLD: 0 /100 WBCS — SIGNIFICANT CHANGE UP (ref 0–0)
PLATELET # BLD AUTO: 522 K/UL — HIGH (ref 150–400)
PLATELET # BLD AUTO: 524 K/UL — HIGH (ref 150–400)
PLATELET # BLD AUTO: 540 K/UL
POTASSIUM SERPL-MCNC: 3.5 MMOL/L — SIGNIFICANT CHANGE UP (ref 3.5–5.3)
POTASSIUM SERPL-SCNC: 3.5 MMOL/L — SIGNIFICANT CHANGE UP (ref 3.5–5.3)
POTASSIUM SERPL-SCNC: 4.4 MMOL/L
PROT SERPL-MCNC: 6.1 G/DL — SIGNIFICANT CHANGE UP (ref 6–8.3)
PROT SERPL-MCNC: 6.4 G/DL
RBC # BLD: 2.9 M/UL — LOW (ref 3.8–5.2)
RBC # BLD: 3.18 M/UL
RBC # BLD: 3.18 M/UL
RBC # BLD: 3.18 M/UL — LOW (ref 3.8–5.2)
RBC # FLD: 22.3 % — HIGH (ref 10.3–14.5)
RBC # FLD: 24 % — HIGH (ref 10.3–14.5)
RBC # FLD: 25.2 %
RETICS # AUTO: 5.5 %
RETICS AGGREG/RBC NFR: 172.2 K/UL
RH IG SCN BLD-IMP: POSITIVE — SIGNIFICANT CHANGE UP
RH IG SCN BLD-IMP: POSITIVE — SIGNIFICANT CHANGE UP
SODIUM SERPL-SCNC: 142 MMOL/L
SODIUM SERPL-SCNC: 142 MMOL/L — SIGNIFICANT CHANGE UP (ref 135–145)
TIBC SERPL-MCNC: 277 UG/DL — SIGNIFICANT CHANGE UP (ref 220–430)
TIBC SERPL-MCNC: 332 UG/DL
TRANSFERRIN SERPL-MCNC: 235 MG/DL — SIGNIFICANT CHANGE UP (ref 200–360)
TRIGL SERPL-MCNC: 89 MG/DL — SIGNIFICANT CHANGE UP
TRIGL SERPL-MCNC: 90 MG/DL
TSH SERPL-ACNC: 0.6 UIU/ML
TSH SERPL-MCNC: 0.53 UIU/ML — SIGNIFICANT CHANGE UP (ref 0.27–4.2)
UIBC SERPL-MCNC: 260 UG/DL — SIGNIFICANT CHANGE UP (ref 110–370)
UIBC SERPL-MCNC: 315 UG/DL
VIT B12 SERPL-MCNC: 580 PG/ML
WBC # BLD: 6.26 K/UL — SIGNIFICANT CHANGE UP (ref 3.8–10.5)
WBC # BLD: 6.55 K/UL — SIGNIFICANT CHANGE UP (ref 3.8–10.5)
WBC # FLD AUTO: 6.26 K/UL — SIGNIFICANT CHANGE UP (ref 3.8–10.5)
WBC # FLD AUTO: 6.55 K/UL — SIGNIFICANT CHANGE UP (ref 3.8–10.5)
WBC # FLD AUTO: 7.1 K/UL

## 2021-12-02 PROCEDURE — 99233 SBSQ HOSP IP/OBS HIGH 50: CPT | Mod: GC

## 2021-12-02 PROCEDURE — 36000 PLACE NEEDLE IN VEIN: CPT

## 2021-12-02 PROCEDURE — 76937 US GUIDE VASCULAR ACCESS: CPT | Mod: 26

## 2021-12-02 PROCEDURE — 86077 PHYS BLOOD BANK SERV XMATCH: CPT

## 2021-12-02 PROCEDURE — 99222 1ST HOSP IP/OBS MODERATE 55: CPT

## 2021-12-02 RX ORDER — VANCOMYCIN HCL 1 G
1250 VIAL (EA) INTRAVENOUS EVERY 12 HOURS
Refills: 0 | Status: COMPLETED | OUTPATIENT
Start: 2021-12-02 | End: 2021-12-03

## 2021-12-02 RX ORDER — PANTOPRAZOLE SODIUM 20 MG/1
40 TABLET, DELAYED RELEASE ORAL
Refills: 0 | Status: DISCONTINUED | OUTPATIENT
Start: 2021-12-02 | End: 2021-12-03

## 2021-12-02 RX ORDER — VENLAFAXINE HCL 75 MG
150 CAPSULE, EXT RELEASE 24 HR ORAL DAILY
Refills: 0 | Status: DISCONTINUED | OUTPATIENT
Start: 2021-12-02 | End: 2021-12-03

## 2021-12-02 RX ORDER — LIOTHYRONINE SODIUM 25 UG/1
5 TABLET ORAL DAILY
Refills: 0 | Status: DISCONTINUED | OUTPATIENT
Start: 2021-12-02 | End: 2021-12-03

## 2021-12-02 RX ADMIN — Medication 500 MILLIGRAM(S): at 13:24

## 2021-12-02 RX ADMIN — Medication 650 MILLIGRAM(S): at 02:16

## 2021-12-02 RX ADMIN — QUETIAPINE FUMARATE 75 MILLIGRAM(S): 200 TABLET, FILM COATED ORAL at 21:23

## 2021-12-02 RX ADMIN — Medication 650 MILLIGRAM(S): at 19:45

## 2021-12-02 RX ADMIN — Medication 3 MILLIGRAM(S): at 21:53

## 2021-12-02 RX ADMIN — PANTOPRAZOLE SODIUM 40 MILLIGRAM(S): 20 TABLET, DELAYED RELEASE ORAL at 06:28

## 2021-12-02 RX ADMIN — Medication 166.67 MILLIGRAM(S): at 07:51

## 2021-12-02 RX ADMIN — Medication 30 MILLILITER(S): at 01:28

## 2021-12-02 RX ADMIN — Medication 166.67 MILLIGRAM(S): at 19:37

## 2021-12-02 RX ADMIN — Medication 10 MILLIGRAM(S): at 21:25

## 2021-12-02 RX ADMIN — Medication 112 MICROGRAM(S): at 06:28

## 2021-12-02 RX ADMIN — POLYETHYLENE GLYCOL 3350 17 GRAM(S): 17 POWDER, FOR SOLUTION ORAL at 13:28

## 2021-12-02 RX ADMIN — Medication 75 MILLIGRAM(S): at 13:27

## 2021-12-02 RX ADMIN — LITHIUM CARBONATE 150 MILLIGRAM(S): 300 TABLET, EXTENDED RELEASE ORAL at 22:51

## 2021-12-02 RX ADMIN — BUPROPION HYDROCHLORIDE 300 MILLIGRAM(S): 150 TABLET, EXTENDED RELEASE ORAL at 06:28

## 2021-12-02 RX ADMIN — Medication 325 MILLIGRAM(S): at 13:28

## 2021-12-02 RX ADMIN — SENNA PLUS 2 TABLET(S): 8.6 TABLET ORAL at 21:24

## 2021-12-02 RX ADMIN — Medication 100 MILLIGRAM(S): at 03:16

## 2021-12-02 RX ADMIN — Medication 650 MILLIGRAM(S): at 01:16

## 2021-12-02 NOTE — BH CONSULTATION LIAISON ASSESSMENT NOTE - NSBHCHARTREVIEWVS_PSY_A_CORE FT
Vital Signs Last 24 Hrs  T(C): 36.8 (02 Dec 2021 13:00), Max: 37.2 (01 Dec 2021 17:03)  T(F): 98.2 (02 Dec 2021 13:00), Max: 98.9 (01 Dec 2021 17:03)  HR: 80 (02 Dec 2021 13:00) (80 - 88)  BP: 129/75 (02 Dec 2021 13:00) (97/60 - 166/81)  BP(mean): --  RR: 17 (02 Dec 2021 13:00) (8 - 18)  SpO2: 99% (02 Dec 2021 13:00) (97% - 100%)

## 2021-12-02 NOTE — DISCHARGE NOTE PROVIDER - NSDCFUADDAPPT_GEN_ALL_CORE_FT
Please bring your Insurance card, Photo ID, Covid-19 vaccination card (if applicable) and Discharge paperwork to the following appointment:    (1) Please follow up with your Primary Care Provider, Dr. Liset Woo on behalf of Dr. Jesusita Padilla at [address] [place] [address], [address] [place] [address] on 12/16/2021 at 2:00pm.    Appointment was scheduled by Ms. REX Alberts, Referral Coordinator.   Please bring your Insurance card, Photo ID, Covid-19 vaccination card (if applicable) and Discharge paperwork to the following appointment:    (1) Please follow up with your Primary Care Provider, Dr. Liset Woo on behalf of Dr. Jesusita Padilla at [address] [place] [address], [address] [place] [address] on 12/16/2021 at 2:00pm.    Appointment was scheduled by Ms. REX Alberts, Referral Coordinator.    (2) Please call your gastroenterology specialist, Dr. Liv Christopher at [phone] to check for cancellations to make an earlier appointment.

## 2021-12-02 NOTE — PROGRESS NOTE ADULT - SUBJECTIVE AND OBJECTIVE BOX
** INCOMPLETE **    OVERNIGHT EVENTS:     SUBJECTIVE:    VITAL SIGNS:  Vital Signs Last 24 Hrs  T(C): 36.3 (02 Dec 2021 06:35), Max: 37.2 (01 Dec 2021 17:03)  T(F): 97.4 (02 Dec 2021 06:35), Max: 98.9 (01 Dec 2021 17:03)  HR: 88 (02 Dec 2021 06:35) (83 - 88)  BP: 100/59 (02 Dec 2021 06:35) (97/60 - 166/81)  BP(mean): --  RR: 17 (02 Dec 2021 06:35) (8 - 18)  SpO2: 99% (02 Dec 2021 06:35) (97% - 100%)    PHYSICAL EXAM:  General: NAD; speaking in full sentences  HEENT: NC/AT; PERRL; EOMI; MMM  Neck: supple; no JVD  Cardiac: RRR; +S1/S2  Pulm: CTA B/L; no W/R/R  GI: soft, NT/ND, +BS  Extremities: WWP; no edema, clubbing or cyanosis  Vasc: 2+ radial, DP pulses B/L  Neuro: AAOx3; no focal deficits    MEDICATIONS:  MEDICATIONS  (STANDING):  ascorbic acid 500 milliGRAM(s) Oral daily  buPROPion XL (24-Hour) . 300 milliGRAM(s) Oral every 24 hours  clonazePAM  Tablet 3 milliGRAM(s) Oral at bedtime  dextrose 40% Gel 15 Gram(s) Oral once  dextrose 5%. 1000 milliLiter(s) (50 mL/Hr) IV Continuous <Continuous>  dextrose 50% Injectable 25 Gram(s) IV Push once  ferrous    sulfate 325 milliGRAM(s) Oral daily  glucagon  Injectable 1 milliGRAM(s) IntraMuscular once  insulin lispro (ADMELOG) corrective regimen sliding scale   SubCutaneous at bedtime  levothyroxine 112 MICROGram(s) Oral daily  lithium 150 milliGRAM(s) Oral at bedtime  melatonin 10 milliGRAM(s) Oral at bedtime  pantoprazole    Tablet 40 milliGRAM(s) Oral before breakfast  polyethylene glycol 3350 17 Gram(s) Oral daily  potassium chloride   Powder 20 milliEquivalent(s) Oral once  QUEtiapine 75 milliGRAM(s) Oral at bedtime  senna 2 Tablet(s) Oral at bedtime  topiramate 75 milliGRAM(s) Oral daily  vancomycin  IVPB 1250 milliGRAM(s) IV Intermittent every 12 hours    MEDICATIONS  (PRN):  acetaminophen     Tablet .. 650 milliGRAM(s) Oral every 6 hours PRN Temp greater or equal to 38C (100.4F), Mild Pain (1 - 3)      ALLERGIES:  Allergies    No Known Allergies    Intolerances        LABS:                        7.2    7.38  )-----------( 592      ( 01 Dec 2021 17:46 )             26.5     12-01    140  |  107  |  16  ----------------------------<  87  3.8   |  23  |  0.63    Ca    8.7      01 Dec 2021 17:47    TPro  6.9  /  Alb  3.7  /  TBili  0.3  /  DBili  x   /  AST  21  /  ALT  13  /  AlkPhos  107  12-01    PT/INR - ( 01 Dec 2021 20:35 )   PT: 15.6 sec;   INR: 1.32          PTT - ( 01 Dec 2021 20:35 )  PTT:33.1 sec    RADIOLOGY & ADDITIONAL TESTS: Reviewed. ** INCOMPLETE **    OVERNIGHT EVENTS:     SUBJECTIVE:    VITAL SIGNS:  Vital Signs Last 24 Hrs  T(C): 36.3 (02 Dec 2021 06:35), Max: 37.2 (01 Dec 2021 17:03)  T(F): 97.4 (02 Dec 2021 06:35), Max: 98.9 (01 Dec 2021 17:03)  HR: 88 (02 Dec 2021 06:35) (83 - 88)  BP: 100/59 (02 Dec 2021 06:35) (97/60 - 166/81)  BP(mean): --  RR: 17 (02 Dec 2021 06:35) (8 - 18)  SpO2: 99% (02 Dec 2021 06:35) (97% - 100%)    PHYSICAL EXAM:  General: NAD; speaking in full sentences  HEENT: NC/AT; PERRL; EOMI; MMM  Neck: supple; no JVD  Cardiac: RRR; +S1/S2. Crescendo-decrescendo murmur loudest at the RUSB.  Pulm: CTA B/L; no W/R/R  GI: soft, NT/ND, +BS  Extremities: WWP; no edema, clubbing or cyanosis. Purulent, warm to touch.   Vasc: 2+ radial, DP pulses B/L.  Derm: Excoriations of the right eye brow, cheek, arm, and hip.  Neuro: AAOx3; no focal deficits    MEDICATIONS:  MEDICATIONS  (STANDING):  ascorbic acid 500 milliGRAM(s) Oral daily  buPROPion XL (24-Hour) . 300 milliGRAM(s) Oral every 24 hours  clonazePAM  Tablet 3 milliGRAM(s) Oral at bedtime  dextrose 40% Gel 15 Gram(s) Oral once  dextrose 5%. 1000 milliLiter(s) (50 mL/Hr) IV Continuous <Continuous>  dextrose 50% Injectable 25 Gram(s) IV Push once  ferrous    sulfate 325 milliGRAM(s) Oral daily  glucagon  Injectable 1 milliGRAM(s) IntraMuscular once  insulin lispro (ADMELOG) corrective regimen sliding scale   SubCutaneous at bedtime  levothyroxine 112 MICROGram(s) Oral daily  lithium 150 milliGRAM(s) Oral at bedtime  melatonin 10 milliGRAM(s) Oral at bedtime  pantoprazole    Tablet 40 milliGRAM(s) Oral before breakfast  polyethylene glycol 3350 17 Gram(s) Oral daily  potassium chloride   Powder 20 milliEquivalent(s) Oral once  QUEtiapine 75 milliGRAM(s) Oral at bedtime  senna 2 Tablet(s) Oral at bedtime  topiramate 75 milliGRAM(s) Oral daily  vancomycin  IVPB 1250 milliGRAM(s) IV Intermittent every 12 hours    MEDICATIONS  (PRN):  acetaminophen     Tablet .. 650 milliGRAM(s) Oral every 6 hours PRN Temp greater or equal to 38C (100.4F), Mild Pain (1 - 3)      ALLERGIES:  Allergies    No Known Allergies    Intolerances        LABS:                        7.2    7.38  )-----------( 592      ( 01 Dec 2021 17:46 )             26.5     12-01    140  |  107  |  16  ----------------------------<  87  3.8   |  23  |  0.63    Ca    8.7      01 Dec 2021 17:47    TPro  6.9  /  Alb  3.7  /  TBili  0.3  /  DBili  x   /  AST  21  /  ALT  13  /  AlkPhos  107  12-01    PT/INR - ( 01 Dec 2021 20:35 )   PT: 15.6 sec;   INR: 1.32          PTT - ( 01 Dec 2021 20:35 )  PTT:33.1 sec    RADIOLOGY & ADDITIONAL TESTS: Reviewed. OVERNIGHT EVENTS: No acute events    SUBJECTIVE: Patient examined at bedside, resting comfortably in bed. She reports no change in her wounds, and notes she continues to itch. She reports her recent hospitalization for LLE cellulitis was in July 13. She feels very tired, denies any fevers, chills, nausea, vomiting. She endorses constipation with last bowel movement a few days ago.    VITAL SIGNS:  Vital Signs Last 24 Hrs  T(C): 36.3 (02 Dec 2021 06:35), Max: 37.2 (01 Dec 2021 17:03)  T(F): 97.4 (02 Dec 2021 06:35), Max: 98.9 (01 Dec 2021 17:03)  HR: 88 (02 Dec 2021 06:35) (83 - 88)  BP: 100/59 (02 Dec 2021 06:35) (97/60 - 166/81)  BP(mean): --  RR: 17 (02 Dec 2021 06:35) (8 - 18)  SpO2: 99% (02 Dec 2021 06:35) (97% - 100%)    PHYSICAL EXAM:  General: NAD; speaking in full sentences  HEENT: NC/AT; PERRL; EOMI; MMM  Neck: supple; no JVD  Cardiac: RRR; +S1/S2. Crescendo-decrescendo murmur loudest at the RUSB.  Pulm: CTA B/L; no W/R/R  GI: soft, NT/ND, +BS  Extremities: WWP; no edema, clubbing or cyanosis. LLE demonstrated diffuse, circumferential erythema of the lower leg. Large, superficial exposed moist, draining wounds with roger purulence.   Vasc 2+ radial, DP, PT pulses B/L.  Derm: 2-3cm crusting, lesions of the right eye brow, lateral cheek, and scattered across abdomen. Diffusely scattered macules and patches of erythema.   Neuro: AAOx3; no focal deficits    MEDICATIONS:  MEDICATIONS  (STANDING):  ascorbic acid 500 milliGRAM(s) Oral daily  buPROPion XL (24-Hour) . 300 milliGRAM(s) Oral every 24 hours  clonazePAM  Tablet 3 milliGRAM(s) Oral at bedtime  dextrose 40% Gel 15 Gram(s) Oral once  dextrose 5%. 1000 milliLiter(s) (50 mL/Hr) IV Continuous <Continuous>  dextrose 50% Injectable 25 Gram(s) IV Push once  ferrous    sulfate 325 milliGRAM(s) Oral daily  glucagon  Injectable 1 milliGRAM(s) IntraMuscular once  insulin lispro (ADMELOG) corrective regimen sliding scale   SubCutaneous at bedtime  levothyroxine 112 MICROGram(s) Oral daily  lithium 150 milliGRAM(s) Oral at bedtime  melatonin 10 milliGRAM(s) Oral at bedtime  pantoprazole    Tablet 40 milliGRAM(s) Oral before breakfast  polyethylene glycol 3350 17 Gram(s) Oral daily  potassium chloride   Powder 20 milliEquivalent(s) Oral once  QUEtiapine 75 milliGRAM(s) Oral at bedtime  senna 2 Tablet(s) Oral at bedtime  topiramate 75 milliGRAM(s) Oral daily  vancomycin  IVPB 1250 milliGRAM(s) IV Intermittent every 12 hours    MEDICATIONS  (PRN):  acetaminophen     Tablet .. 650 milliGRAM(s) Oral every 6 hours PRN Temp greater or equal to 38C (100.4F), Mild Pain (1 - 3)      ALLERGIES:  Allergies    No Known Allergies    Intolerances        LABS:                        7.2    7.38  )-----------( 592      ( 01 Dec 2021 17:46 )             26.5     12-01    140  |  107  |  16  ----------------------------<  87  3.8   |  23  |  0.63    Ca    8.7      01 Dec 2021 17:47    TPro  6.9  /  Alb  3.7  /  TBili  0.3  /  DBili  x   /  AST  21  /  ALT  13  /  AlkPhos  107  12-01    PT/INR - ( 01 Dec 2021 20:35 )   PT: 15.6 sec;   INR: 1.32          PTT - ( 01 Dec 2021 20:35 )  PTT:33.1 sec    RADIOLOGY & ADDITIONAL TESTS: Reviewed. OVERNIGHT EVENTS: No acute events    SUBJECTIVE: Patient examined at bedside, resting comfortably in bed. She reports no change in her wounds, and notes she continues to itch. She feels very tired, denies any fevers, chills, nausea, vomiting. She endorses constipation with last bowel movement a few days ago. She is unable to recall which antibiotics she was taking and who prescribed them most recently.     VITAL SIGNS:  Vital Signs Last 24 Hrs  T(C): 36.3 (02 Dec 2021 06:35), Max: 37.2 (01 Dec 2021 17:03)  T(F): 97.4 (02 Dec 2021 06:35), Max: 98.9 (01 Dec 2021 17:03)  HR: 88 (02 Dec 2021 06:35) (83 - 88)  BP: 100/59 (02 Dec 2021 06:35) (97/60 - 166/81)  BP(mean): --  RR: 17 (02 Dec 2021 06:35) (8 - 18)  SpO2: 99% (02 Dec 2021 06:35) (97% - 100%)    PHYSICAL EXAM:  General: NAD; speaking in full sentences  HEENT: NC/AT; PERRL; EOMI; MMM  Neck: supple; no JVD  Cardiac: RRR; +S1/S2. Crescendo-decrescendo murmur loudest at the RUSB.  Pulm: CTA B/L; no W/R/R  GI: soft, NT/ND, +BS  Extremities: WWP; no edema, clubbing or cyanosis. LLE demonstrated diffuse, circumferential erythema of the lower leg. Large, superficial exposed moist, draining wounds with roger purulence.   Vasc 2+ radial, DP, PT pulses B/L.  Derm: 2-3cm crusting, lesions of the right eye brow, lateral cheek, and scattered across abdomen. Diffusely scattered macules and patches of erythema.   Neuro: AAOx3; no focal deficits    MEDICATIONS:  MEDICATIONS  (STANDING):  ascorbic acid 500 milliGRAM(s) Oral daily  buPROPion XL (24-Hour) . 300 milliGRAM(s) Oral every 24 hours  clonazePAM  Tablet 3 milliGRAM(s) Oral at bedtime  dextrose 40% Gel 15 Gram(s) Oral once  dextrose 5%. 1000 milliLiter(s) (50 mL/Hr) IV Continuous <Continuous>  dextrose 50% Injectable 25 Gram(s) IV Push once  ferrous    sulfate 325 milliGRAM(s) Oral daily  glucagon  Injectable 1 milliGRAM(s) IntraMuscular once  insulin lispro (ADMELOG) corrective regimen sliding scale   SubCutaneous at bedtime  levothyroxine 112 MICROGram(s) Oral daily  lithium 150 milliGRAM(s) Oral at bedtime  melatonin 10 milliGRAM(s) Oral at bedtime  pantoprazole    Tablet 40 milliGRAM(s) Oral before breakfast  polyethylene glycol 3350 17 Gram(s) Oral daily  potassium chloride   Powder 20 milliEquivalent(s) Oral once  QUEtiapine 75 milliGRAM(s) Oral at bedtime  senna 2 Tablet(s) Oral at bedtime  topiramate 75 milliGRAM(s) Oral daily  vancomycin  IVPB 1250 milliGRAM(s) IV Intermittent every 12 hours    MEDICATIONS  (PRN):  acetaminophen     Tablet .. 650 milliGRAM(s) Oral every 6 hours PRN Temp greater or equal to 38C (100.4F), Mild Pain (1 - 3)      ALLERGIES:  Allergies    No Known Allergies    Intolerances        LABS:                        7.2    7.38  )-----------( 592      ( 01 Dec 2021 17:46 )             26.5     12-01    140  |  107  |  16  ----------------------------<  87  3.8   |  23  |  0.63    Ca    8.7      01 Dec 2021 17:47    TPro  6.9  /  Alb  3.7  /  TBili  0.3  /  DBili  x   /  AST  21  /  ALT  13  /  AlkPhos  107  12-01    PT/INR - ( 01 Dec 2021 20:35 )   PT: 15.6 sec;   INR: 1.32          PTT - ( 01 Dec 2021 20:35 )  PTT:33.1 sec    RADIOLOGY & ADDITIONAL TESTS: Reviewed. OVERNIGHT EVENTS: No acute events    SUBJECTIVE: Patient examined at bedside, resting comfortably in bed. She reports no change in her wounds, and notes she continues to itch. She feels very tired, denies any fevers, chills, nausea, vomiting. She endorses constipation with last bowel movement a few days ago. She becomes anxious as she attempts to recall which antibiotics she has taking and who prescribed them.     VITAL SIGNS:  Vital Signs Last 24 Hrs  T(C): 36.3 (02 Dec 2021 06:35), Max: 37.2 (01 Dec 2021 17:03)  T(F): 97.4 (02 Dec 2021 06:35), Max: 98.9 (01 Dec 2021 17:03)  HR: 88 (02 Dec 2021 06:35) (83 - 88)  BP: 100/59 (02 Dec 2021 06:35) (97/60 - 166/81)  BP(mean): --  RR: 17 (02 Dec 2021 06:35) (8 - 18)  SpO2: 99% (02 Dec 2021 06:35) (97% - 100%)    PHYSICAL EXAM:  General: NAD; speaking in full sentences  HEENT: NC/AT; PERRL; EOMI; MMM  Neck: supple; no JVD  Cardiac: RRR; +S1/S2. Crescendo-decrescendo murmur loudest at the RUSB.  Pulm: CTA B/L; no W/R/R  GI: soft, NT/ND, +BS  Extremities: WWP; no edema, clubbing or cyanosis. LLE demonstrated diffuse, circumferential erythema of the lower leg. Large, superficial exposed moist, draining wounds with roger purulence.   Vasc 2+ radial, DP, PT pulses B/L.  Derm: 2-3cm crusting, lesions of the right eye brow, lateral cheek, and scattered across abdomen. Diffusely scattered macules and patches of erythema.   Neuro: AAOx3; no focal deficits    MEDICATIONS:  MEDICATIONS  (STANDING):  ascorbic acid 500 milliGRAM(s) Oral daily  buPROPion XL (24-Hour) . 300 milliGRAM(s) Oral every 24 hours  clonazePAM  Tablet 3 milliGRAM(s) Oral at bedtime  dextrose 40% Gel 15 Gram(s) Oral once  dextrose 5%. 1000 milliLiter(s) (50 mL/Hr) IV Continuous <Continuous>  dextrose 50% Injectable 25 Gram(s) IV Push once  ferrous    sulfate 325 milliGRAM(s) Oral daily  glucagon  Injectable 1 milliGRAM(s) IntraMuscular once  insulin lispro (ADMELOG) corrective regimen sliding scale   SubCutaneous at bedtime  levothyroxine 112 MICROGram(s) Oral daily  lithium 150 milliGRAM(s) Oral at bedtime  melatonin 10 milliGRAM(s) Oral at bedtime  pantoprazole    Tablet 40 milliGRAM(s) Oral before breakfast  polyethylene glycol 3350 17 Gram(s) Oral daily  potassium chloride   Powder 20 milliEquivalent(s) Oral once  QUEtiapine 75 milliGRAM(s) Oral at bedtime  senna 2 Tablet(s) Oral at bedtime  topiramate 75 milliGRAM(s) Oral daily  vancomycin  IVPB 1250 milliGRAM(s) IV Intermittent every 12 hours    MEDICATIONS  (PRN):  acetaminophen     Tablet .. 650 milliGRAM(s) Oral every 6 hours PRN Temp greater or equal to 38C (100.4F), Mild Pain (1 - 3)      ALLERGIES:  Allergies    No Known Allergies    Intolerances        LABS:                        7.2    7.38  )-----------( 592      ( 01 Dec 2021 17:46 )             26.5     12-01    140  |  107  |  16  ----------------------------<  87  3.8   |  23  |  0.63    Ca    8.7      01 Dec 2021 17:47    TPro  6.9  /  Alb  3.7  /  TBili  0.3  /  DBili  x   /  AST  21  /  ALT  13  /  AlkPhos  107  12-01    PT/INR - ( 01 Dec 2021 20:35 )   PT: 15.6 sec;   INR: 1.32          PTT - ( 01 Dec 2021 20:35 )  PTT:33.1 sec    RADIOLOGY & ADDITIONAL TESTS: Reviewed.

## 2021-12-02 NOTE — PROGRESS NOTE ADULT - PROBLEM SELECTOR PLAN 1
Previously treated with Keflex and another abx (patient can't remember which). Completed abx course 2 days ago. Sent in to ED by outpatient provider for continued infection of LLE. No leukocytosis, fever. On exam, leg is tender, erythematous with multiple excoriations and wounds, and purulence.   - S/p Vancomycin 1250mg x1 in ED  - C/w Vanc 1250mg q12h  - F/u vanc trough 12/3  - Wound care consult  - Consider wound culture Previously treated with Keflex and another abx (patient can't remember which). Completed abx course 2 days ago. Sent in to ED by outpatient provider for continued infection of LLE. No leukocytosis, fever. On exam, leg is tender, erythematous with multiple large open wounds and purulence.   - S/p Vancomycin 1250mg x1 in ED  - C/w Vanc 1250mg q12h  - F/u vanc trough 12/3  - Wound care consult  - Consider wound culture Previously treated with Keflex and another abx (patient can't remember which). Completed abx course 2 days ago.   Pt cannot recall antibiotics taken. Cellulitis began in March/May, prior to her R total hip arthroplasty on July 13. Cannot recall which antibiotics taken. Cellulitis has been managed by Dr. Selena Orr of vascular surgery. Sent in to ED by new outpatient cardiologist Dr. Booker for continued infection of LLE. No leukocytosis, fever. On exam, leg is tender, erythematous with multiple large open wounds and purulence.   - S/p Vancomycin 1250mg x1 in ED  - C/w Vanc 1250mg q12h  - F/u vanc trough 12/3  - Wound care consult  - Consider wound culture Chronic problem since March/May 2021 managed by Dr. Selena Orr of [facility] vascular surgery. Pt most recently at [facility] on 11/16, discharged on Keflex 4x per day. Dr. Orr reports after discharge, wounds were improved; however, pt removed unna boot, leading to worsening of cellulitis as observed by Dr. Orr last week. Keflex course finished on 11/29. On 12/1 pt saw new outpatient cardiologist Dr. Booker, who was concerned regarding LLE cellulitis. No leukocytosis, fever. On exam, leg is tender, erythematous with multiple large open wounds and purulence. Dr. Orr reports cellulitis typically improves with Unna boot.   - S/p Vancomycin 1250mg x1 in ED  - C/w Vanc 1250mg q12h  - F/u vanc trough 12/3  - Wound care consult  -Outpatient follow up with Dr. Orr and Dr. Booker Chronic problem since March/May 2021 managed by Dr. Selena Pearce of [facility] vascular surgery. Pt most recently at [facility] on 11/16, discharged on Keflex 4x per day. Dr. Orr reports after discharge, wounds were improved; however, pt removed unna boot, leading to worsening of cellulitis as observed by Dr. Orr last week. Keflex course finished on 11/29. On 12/1 pt saw new outpatient cardiologist Dr. Booker, who was concerned regarding LLE cellulitis and referred pt to [facility]. No leukocytosis, fever. On exam, L leg is tender, erythematous with multiple large open wounds and purulence. Dr. Orr reports cellulitis typically improves with Unna boot.   - S/p Vancomycin 1250mg x1 in ED  - C/w Vanc 1250mg q12h  - F/u vanc trough 12/3  - Wound care consult  -Outpatient follow up with Dr. Orr and Dr. Booker

## 2021-12-02 NOTE — PROGRESS NOTE ADULT - PROBLEM SELECTOR PLAN 7
Patient diagnosed with pre-DM. Unknown last A1c. On Metformin 500mg bid at home.   - mISS and carb consistent diet  - F/u AM hbA1C Patient diagnosed with pre-DM. Unknown last A1c. On Metformin 500mg bid at home.   - mISS and carb consistent diet  - Hold metformin in setting of hospitalization, insulin sliding scale PRN  - AM hbA1C 4.2 wnl, though may be falsely low in setting of anemia
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
## 2021-12-02 NOTE — BH CONSULTATION LIAISON ASSESSMENT NOTE - CURRENT MEDICATION
MEDICATIONS  (STANDING):  ascorbic acid 500 milliGRAM(s) Oral daily  buPROPion XL (24-Hour) . 300 milliGRAM(s) Oral every 24 hours  clonazePAM  Tablet 3 milliGRAM(s) Oral at bedtime  dextrose 40% Gel 15 Gram(s) Oral once  dextrose 5%. 1000 milliLiter(s) (50 mL/Hr) IV Continuous <Continuous>  dextrose 50% Injectable 25 Gram(s) IV Push once  ferrous    sulfate 325 milliGRAM(s) Oral daily  glucagon  Injectable 1 milliGRAM(s) IntraMuscular once  insulin lispro (ADMELOG) corrective regimen sliding scale   SubCutaneous at bedtime  levothyroxine 112 MICROGram(s) Oral daily  liothyronine 5 MICROGram(s) Oral daily  lithium 150 milliGRAM(s) Oral at bedtime  melatonin 10 milliGRAM(s) Oral at bedtime  pantoprazole    Tablet 40 milliGRAM(s) Oral before breakfast  polyethylene glycol 3350 17 Gram(s) Oral daily  potassium chloride   Powder 20 milliEquivalent(s) Oral once  QUEtiapine 75 milliGRAM(s) Oral at bedtime  senna 2 Tablet(s) Oral at bedtime  topiramate 75 milliGRAM(s) Oral daily  vancomycin  IVPB 1250 milliGRAM(s) IV Intermittent every 12 hours  venlafaxine XR. 150 milliGRAM(s) Oral daily    MEDICATIONS  (PRN):  acetaminophen     Tablet .. 650 milliGRAM(s) Oral every 6 hours PRN Temp greater or equal to 38C (100.4F), Mild Pain (1 - 3)

## 2021-12-02 NOTE — BH CONSULTATION LIAISON ASSESSMENT NOTE - NSICDXBHSECONDARYDX_PSY_ALL_CORE
Cellulitis of lower limb   L03.119  Excoriation (skin-picking) disorder   F42.4  Hypothyroidism   E03.9  DVT, lower extremity   I82.409  Pre-diabetes   R73.03  Aortic stenosis   I35.0  Paroxysmal atrial fibrillation   I48.0  Prophylactic measure   Z29.9  Constipation   K59.00  Anemia   D64.9

## 2021-12-02 NOTE — PROGRESS NOTE ADULT - PROBLEM SELECTOR PLAN 3
Patient with skin picking disorder. Multiple excoriations over face, arms, abdomen, legs, and hips. Uncontrolled. Patient does not see a therapist and only manages with medications prescribed by psychopharmacologist.  - C/w home psych meds  - Inpatient psych consult Patient with uncontrolled skin picking disorder. Multiple excoriations over face, arms, abdomen, legs, and hips. Patient does not see a therapist and only manages with medications prescribed by psychopharmacologist.  - C/w home psych meds  - Inpatient psych consult, especially given likely contribution to non-healing cellulitis Patient with uncontrolled skin picking disorder. Multiple excoriations over face, arms, abdomen, legs, and hips. Patient does not see a therapist and only manages with medications prescribed by psychopharmacologist.  - C/w home psych meds  - Inpatient psych consult, especially given likely contribution of skin-picking to non-healing cellulitis

## 2021-12-02 NOTE — DISCHARGE NOTE PROVIDER - NSDCCPCAREPLAN_GEN_ALL_CORE_FT
PRINCIPAL DISCHARGE DIAGNOSIS  Diagnosis: Cellulitis  Assessment and Plan of Treatment: Cellulitis is an infection of the skin. This infection can cause redness, pain, and swelling. Cellulitis tends to affect deep layers of skin and sometimes the fat under the skin. This condition can happen when germs get into the skin. Normally, different types of germs live on a person's skin, and mostly these germs do not cause any problems. But if a person gets a cut or break in the skin, the germs can penetrate and cause an infection. Certain conditions can increase a person's chance of getting cellulitis. These include: having a cut (even a tiny one), having another type of skin infection or a long-term skin condition, swelling of the skin or swelling in the body, and being overweight. Follow up with your PCP within 1 week of discharge      SECONDARY DISCHARGE DIAGNOSES  Diagnosis: Anemia  Assessment and Plan of Treatment: Anemia is the medical term for when a person has too few red blood cells. Red blood cells are the cells in your blood that carry oxygen. If you have too few red blood cells, your body does not get all the oxygen it needs. Most people with anemia have no symptoms. They find out they have it after their doctor does blood tests for another reason. People who do have symptoms might feel tired or weak, especially if they try to exercise or have headaches. If you experience these symptoms you should see your primary care provider for further evaluation. Follow up with your PCP within 1 week of discharge

## 2021-12-02 NOTE — DISCHARGE NOTE PROVIDER - NPI NUMBER (FOR SYSADMIN USE ONLY) :
[[id number]] [[id number]],[[id number]] [[id number]],[[id number]],[[id number]] [[id number]],[[id number]],[[id number]],[UNKNOWN]

## 2021-12-02 NOTE — BH CONSULTATION LIAISON ASSESSMENT NOTE - SUMMARY
Ms. 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 BIBA from cardiologist office on 12/1/21, for anemia and unhealing LLE cellulitis. Psych consulted to assess excoriation disorder and medication management.     Ms. Garcia's assessment is notable for an extremely labile affect and potential cluster B personality traits. She reports a long history of depressive symptoms, aggravated by frequent hospitalizations the past six months. She has insight into the role her skin picking has played in her current cellulitis and expresses clear intention to continue outpatient psychiatric care as well as psychotherapy to address symptoms. Patient would benefit from addressing her polypharmacy with outpatient psych provider and continued psychotherapy to address childhood trauma. See below recommendations for this hospitalization.     RECOMMENDATIONS  - Suggest confirming psych medication rec with pharmacy or outpatient psych provider. Once confirmed, see below.  - Suggest continuing Wellbutrin XL 300mg PO daily and venlafaxine 300mg PO daily for depressive symptoms.   - Suggest continuing Klonopin 3mg PO qHS and melatonin 5mg PO qHS for insomnia.  - Could offer Seroquel 75mg PO qHS PRN for insomnia if needed after standing meds.

## 2021-12-02 NOTE — PROGRESS NOTE ADULT - PROBLEM SELECTOR PLAN 5
Patient with hypothyroidism on synthroid and cytomel. Patient says she feels as though her synthroid dose needs to be adjusted because she has been gaining weight despite not having an appetite. Has not seen PCP in few months. She also takes cytomel for her hypothyroidism which she says her doctor said has side effects that help with depression.   - C/w home synthroid 112mcg qam   - F/u TSH Patient with hypothyroidism on synthroid and cytomel. Patient says she feels as though her synthroid dose needs to be adjusted because she has been gaining weight despite not having an appetite. Has not seen PCP in few months. She also takes cytomel for her hypothyroidism which she says her doctor said has side effects that help with depression.   - C/w home synthroid 112mcg, cytomel 5mcg  - TSH wnl
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
## 2021-12-02 NOTE — DISCHARGE NOTE PROVIDER - PROVIDER TOKENS
PROVIDER:[TOKEN:[[id number]],FOLLOWUP:[2 weeks]] PROVIDER:[TOKEN:[[id number]],FOLLOWUP:[2 weeks],SCHEDULEDAPPTTIME:[12:00 AM]],PROVIDER:[[id number]],SCHEDULEDAPPT:[12/14/2021],SCHEDULEDAPPTTIME:[10:00 AM]] PROVIDER:[TOKEN:[[id number]],SCHEDULEDAPPT:[12/15/2021],SCHEDULEDAPPTTIME:[02:00 PM]],PROVIDER:[TOKEN:[[id number]],SCHEDULEDAPPT:[12/14/2021],SCHEDULEDAPPTTIME:[10:00 AM]] PROVIDER:[TOKEN:[[id number]],SCHEDULEDAPPT:[12/15/2021],SCHEDULEDAPPTTIME:[02:00 PM]],PROVIDER:[TOKEN:[[id number]],SCHEDULEDAPPT:[12/14/2021],SCHEDULEDAPPTTIME:[10:00 AM]],PROVIDER:[TOKEN:[[id number]]] PROVIDER:[TOKEN:[[id number]],SCHEDULEDAPPT:[12/15/2021],SCHEDULEDAPPTTIME:[02:00 PM]],PROVIDER:[TOKEN:[[id number]],SCHEDULEDAPPT:[12/14/2021],SCHEDULEDAPPTTIME:[10:00 AM]],PROVIDER:[TOKEN:[[id number]],SCHEDULEDAPPT:[12/16/2021],SCHEDULEDAPPTTIME:[02:00 PM]] PROVIDER:[TOKEN:[[id number]],SCHEDULEDAPPT:[12/15/2021],SCHEDULEDAPPTTIME:[02:00 PM]],PROVIDER:[TOKEN:[[id number]],SCHEDULEDAPPT:[12/14/2021],SCHEDULEDAPPTTIME:[10:00 AM]],PROVIDER:[TOKEN:[[id number]],SCHEDULEDAPPT:[12/16/2021],SCHEDULEDAPPTTIME:[02:00 PM]],FREE:[LAST:[Ivanina],FIRST:[Liv],PHONE:[phone]],FAX:[phone]],ADDRESS:[[address] [place] [address]],SCHEDULEDAPPT:[02/03/2022],SCHEDULEDAPPTTIME:[09:45 AM]]

## 2021-12-02 NOTE — DISCHARGE NOTE PROVIDER - CARE PROVIDER_API CALL
Viv Booker)  Cardiovascular Disease; Internal Medicine  [address], [address]  [place], [place] [address]  Phone: [phone]  Fax: [phone]  Follow Up Time: 2 weeks   Viv Booker)  Cardiovascular Disease; Internal Medicine  [address], [address]  [place] [address]  Phone: [phone]  Fax: [phone]  Follow Up Time: 2 weeks    Selena Pearce  SURGERY  [address] [place] [address]  Phone: [phone]  Fax: [phone]  Scheduled Appointment: 12/14/2021 10:00 AM   Viv Booker)  Cardiovascular Disease; Internal Medicine  [address], [address]  [place] [address]  Phone: [phone]  Fax: [phone]  Scheduled Appointment: 12/15/2021 02:00 PM    Selena Pearce  SURGERY  [facility] [place] [address], [facility] [address]  [place] [address]  Phone: [phone]  Fax: [phone]  Scheduled Appointment: 12/14/2021 10:00 AM   Viv Booker)  Cardiovascular Disease; Internal Medicine  [address], [address]  [place] [address]  Phone: [phone]  Fax: [phone]  Scheduled Appointment: 12/15/2021 02:00 PM    Selena Pearce  SURGERY  [address] [place] [address], [address]  [place] [address]  Phone: [phone]  Fax: [phone]  Scheduled Appointment: 12/14/2021 10:00 AM    Jesusita Padilla)  Internal Medicine  [address] [place] [address]  Phone: [phone]  Fax: [phone]  Follow Up Time:    Viv Booker)  Cardiovascular Disease; Internal Medicine  [address], [address]  [place] [address]  Phone: [phone]  Fax: [phone]  Scheduled Appointment: 12/15/2021 02:00 PM    Selena Pearce  SURGERY  [address] [place] [address], [address]  [place] [address]  Phone: [phone]  Fax: [phone]  Scheduled Appointment: 12/14/2021 10:00 AM    Jesusita Padilla)  Internal Medicine  [address] [place] [address]  Phone: [phone]  Fax: [phone]  Scheduled Appointment: 12/16/2021 02:00 PM   Viv Booker)  Cardiovascular Disease; Internal Medicine  [address], [address]  [place] [address]  Phone: [phone]  Fax: [phone]  Scheduled Appointment: 12/15/2021 02:00 PM    Selena Pearce  SURGERY  [address] [place] [address], [address]  [place] [address]  Phone: [phone]  Fax: [phone]  Scheduled Appointment: 12/14/2021 10:00 AM    Jesusita Padilla)  Internal Medicine  [address] [place] [address]  Phone: [phone]  Fax: [phone]  Scheduled Appointment: 12/16/2021 02:00 PM    Liv Christopher  [address] [place] [address]  Phone: [phone]  Fax: [phone]  Scheduled Appointment: 02/03/2022 09:45 AM

## 2021-12-02 NOTE — PROGRESS NOTE ADULT - PROBLEM SELECTOR PLAN 4
Patient with extensive psychiatric history, on multiple medications. Does not see a behavioral health specialist because she says she can't afford it but has a psychopharmacologist who prescribes her medications.  - C/w home wellbutrin 300mg qd in AM, Topiramate 75mg qd, Seroquel 75mg qhs, Lithium 150mg qhs, Klonopin 3mg qhs  - F/u AM lithium and topiramate level  - Patient also takes methylphenidate for ADHD and Effexor for depression- confirm dose with pharmacy ([facility] [phone])    #MDD  -See above  #Anxiety Disorder  - See above  #ADHD  - See above Patient with extensive psychiatric history, on multiple medications. Does not see a behavioral health specialist because she says she can't afford it but has a psychopharmacologist who prescribes her medications.  - C/w home wellbutrin 300mg qd in AM, Topiramate 75mg qd, Seroquel 75mg qhs, Lithium 150mg qhs, Klonopin 3mg qhs  - AM lithium low at 0.1  - fu AM topiramate level  - Holding methylphenidate for ADHD and Effexor for depression  - Pending psych consult for medication optimization    #MDD  -See above    #Anxiety Disorder  - See above    #ADHD  - See above Patient with extensive psychiatric history, on multiple medications. Does not see a behavioral health specialist because she says she can't afford it but has a psychopharmacologist at [place] who prescribes her medications.  - C/w home wellbutrin 300mg qd in AM, Topiramate 75mg qd, Seroquel 75mg qhs, Lithium 150mg qhs, Klonopin 3mg qhs  - AM lithium low at 0.1  - fu AM topiramate level  - Holding methylphenidate for ADHD and Effexor for depression  - Pending psych consult for medication optimization    #MDD  -See above    #Anxiety Disorder  - See above    #ADHD  - See above

## 2021-12-02 NOTE — DISCHARGE NOTE PROVIDER - HOSPITAL COURSE
#Discharge: do not delete    71F with a h/o Bipolar, excoriation disorder, HTN, preDM, aortic stenosis (unclear past finding), hypothyroidism (on synthroid and cytomel), hx of right hip surgery c/b infection, Afib, and DVT (previously on Eliquis but d/c'd i/s/o anemia), anemia (s/p blood transfusion at [facility] where she gets most of her care and reportedly left AMA), LLE cellulitis (recently hospitalized and d/c'd on keflex and another abx-completed 2 days ago) presents today from cardiologist, Dr. Booker's office, where she follows for wound care and unna boot for worsening and unhealing LLE wound and infection admitted for management of cellulitis.     Inpatient treatment course:     Problem List/Main Diagnoses:     New medications/therapies:   New lines/hardware:  Labs to be followed outpatient:   Exam to be followed outpatient:     Discharge plan: discharge to ______     #Discharge: do not delete    71F with a h/o Bipolar, excoriation disorder, HTN, preDM, aortic stenosis (unclear past finding), hypothyroidism (on synthroid and cytomel), hx of right hip surgery c/b infection, Afib, and DVT (previously on Eliquis but d/c'd i/s/o anemia), anemia (s/p blood transfusion at [facility] where she gets most of her care and reportedly left AMA), LLE cellulitis (recently hospitalized and d/c'd on keflex and another abx-completed 2 days ago) presents today from cardiologist, Dr. Booker's office, where she follows for wound care and unna boot for worsening and unhealing LLE wound and infection admitted for management of cellulitis.     Problem List/Main Diagnoses:   #Cellulitis of lower limb.   Chronic problem since March/May 2021 managed by Dr. Selena Pearce of [facility] vascular surgery. Pt most recently at [facility] on 11/16, discharged on Keflex 4x per day. Dr. Orr reports after discharge, wounds were improved; however, pt removed unna boot, leading to worsening of cellulitis as observed by Dr. Orr last week. Keflex course finished on 11/29. On 12/1 pt saw new outpatient cardiologist Dr. Booker, who was concerned regarding LLE cellulitis and referred pt to [facility]. No leukocytosis, fever. On exam, L leg is tender, erythematous with multiple large open wounds and purulence. Dr. Orr reports cellulitis typically improves with Unna boot.   - C/w Vanc 1250mg q12h  - Wound care consult  -Outpatient follow up with Dr. Orr and Dr. Booker.    #Anemia.   Hgb on admission 7.2 , currently no signs of active bleeding. Patient says that her baseline currently is in the 8 range. In 6/2021 hgb was 11, with a drop to 8 in 10/18 on initial visit with Marianne Silva (Dr. Wyatt), who notified and deferred work-up to Tay Pink NP.  However, pt no longer sees NP, and is searching for a new provider. She report no anemia workup done thus far. On 10/27, she went to [facility] ED, received a RBC transfusion, started on daily iron supplements, and eliquis was discontinued in setting of anemia, though she signed out AMA. She currently denies any abdominal pain, hematochezia, melena, hemoptysis, hematuria, vaginal bleeding. Most recent colonoscopy and EGD was done 2 years ago.   - Iron panel: low Iron  - Hg dropped from 7.2 to 6.8 this AM, now s/p 1 unit pRBC  - Started on protonix 40 qd.    #Excoriation (skin-picking) disorder.   Patient with uncontrolled skin picking disorder. Multiple excoriations over face, arms, abdomen, legs, and hips. Patient does not see a therapist and only manages with medications prescribed by psychopharmacologist.  - C/w home psych meds  - Inpatient psych consult, especially given likely contribution of skin-picking to non-healing cellulitis.    #Bipolar disorder.   Patient with extensive psychiatric history, on multiple medications. Does not see a behavioral health specialist because she says she can't afford it but has a psychopharmacologist at [facility] who prescribes her medications.  - C/w home wellbutrin 300mg qd in AM, Topiramate 75mg qd, Seroquel 75mg qhs, Lithium 150mg qhs, Klonopin 3mg qhs  - AM lithium low at 0.1  - fu AM topiramate level    #MDD  -See above    #Anxiety Disorder  - See above    #ADHD  - See above.    #Hypothyroidism.   Patient with hypothyroidism on synthroid and cytomel. Patient says she feels as though her synthroid dose needs to be adjusted because she has been gaining weight despite not having an appetite. Has not seen PCP in few months. She also takes cytomel for her hypothyroidism which she says her doctor said has side effects that help with depression.   - C/w home synthroid 112mcg, cytomel 5mcg  - TSH wnl.    #DVT, lower extremity.   Patient with history of provoked DVT after hip surgery in the past. Was on Eliquis after that but Eliquis was discontinued after she became anemic for risk of possible bleeding.   - DVT ppx with SCDs at this time.    #Pre-diabetes  Patient diagnosed with pre-DM. Unknown last A1c. On Metformin 500mg bid at home.   - mISS and carb consistent diet  - Hold metformin in setting of hospitalization, insulin sliding scale PRN  - AM hbA1C 4.2 wnl, though may be falsely low in setting of anemia.    #Aortic stenosis  Per outpatient note by Dr. Wyatt ([facility]), Dr. Clarke of [facility] most recently performed echo in 8/21 demonstrating mild aortic stenosis and moderate LA dilation. Pt reports she no longer follows with Dr. Clarke and now follows with Dr. Booker.    #Paroxysmal atrial fibrillation.   Patient with history of paroxysmal afib after hip arthroplasty on  7/16/21. Post-op course complicated by infection and afib. Previously took Eliquis but discontinued 10/27 at [place] ED in the setting of anemia and with unknown source.  - Get further collateral from prior cardiologist (Dr. Clarke - [phone])  - EKG NSR, will get repeat EKG if patient feels palpitations or unwell.    #Constipation.   Patient without bowel movement in few days.   - Started on miralax 17g qd and senna 2tab qhs.    New medications/therapies:   New lines/hardware:  Labs to be followed outpatient:   Exam to be followed outpatient:     Discharge plan: discharge to ______     #Discharge: do not delete    71F with a h/o Bipolar, excoriation disorder, HTN, preDM, mild aortic stenosis, hypothyroidism (on synthroid and cytomel), hx of right hip surgery 7/21  c/b infection, Afib, and DVT (previously on Eliquis but d/c'd 10/27 i/s/o anemia), anemia (s/p blood transfusion at [facility] 10/27 where she reportedly left AMA), LLE cellulitis (chronically managed by Dr. Pearce, hospitalized 11/16 and d/c'd on keflex, completed 2 days ago) referred by her new cardiologist, Dr. Booker, for worsening and unhealing LLE wound and infection admitted for management of cellulitis.     Problem List/Main Diagnoses:   #Cellulitis of lower limb.   Chronic problem since March/May 2021 managed by Dr. Selena Pearce of [facility] vascular surgery for wound care and unna boot. Pt most recently at [facility] on 11/16, discharged on Keflex 4x per day, course on 11/29, though pt removed unna boot. On 12/1 pt saw new outpatient cardiologist Dr. Booker, who was concerned regarding LLE cellulitis and referred pt to [facility]. No leukocytosis, fever. On exam, L leg is tender, erythematous with multiple large open wounds and purulence. Dr. Orr reports cellulitis typically improves with Unna boot.   - s/p Vanc 1250mg q12h x2 days, continue with Bactrim extra-strength BID for 2 weeks  - Wound care consult  -Outpatient follow up with Dr. Orr and Dr. Booker.    #Anemia.   Hgb on admission 7.2 , currently no signs of active bleeding. Patient says that her baseline currently is in the 8 range. In 6/2021 hgb was 11, with a drop to 8 in 10/18 on initial visit with Marianne Silva (Dr. Wyatt), who notified and deferred work-up to Tay Pink NP.  However, pt no longer sees NP, and is searching for a new provider. She report no anemia workup done thus far. On 10/27, she went to [facility] ED, received a RBC transfusion, started on daily iron supplements, and eliquis was discontinued in setting of anemia, though she signed out AMA. She currently denies any abdominal pain, hematochezia, melena, hemoptysis, hematuria, vaginal bleeding. Most recent colonoscopy and EGD was done 2 years ago.   - Iron panel: low Iron  - Started on protonix 40 qd  - Hg to 6.8 this AM, received 1 unit pRBC with appropriate response to Hg 7.7. Pt refused additional labs.       #Excoriation (skin-picking) disorder.   Patient with uncontrolled skin picking disorder. Multiple excoriations over face, arms, abdomen, legs, and hips. Patient does not see a therapist and only manages with medications prescribed by psychopharmacologist at [facility]  - C/w home psych meds  - Psych recommends discussing polypharmacy issues with her outpatient providers    #Bipolar disorder.   Patient with extensive psychiatric history, on multiple medications. Does not see a behavioral health specialist because she says she can't afford it but has a psychopharmacologist at [facility] who prescribes her medications.  - C/w home wellbutrin 300mg qd in AM, Topiramate 75mg qd, Seroquel 75mg qhs, Lithium 150mg qhs, Klonopin 3mg qhs  - AM lithium low at 0.1, topiramate level pending     #MDD  -See above    #Anxiety Disorder  - See above    #ADHD  - See above.    #Hypothyroidism.   Patient with hypothyroidism on synthroid and cytomel. Patient says she feels as though her synthroid dose needs to be adjusted because she has been gaining weight despite not having an appetite. Has not seen PCP in few months. She also takes cytomel for her hypothyroidism which she says her doctor said has side effects that help with depression.   - C/w home synthroid 112mcg, cytomel 5mcg  - TSH wnl.    #DVT, lower extremity.   Patient with history of provoked DVT after hip surgery in the past. Was on Eliquis after that but Eliquis was discontinued after she became anemic for risk of possible bleeding.   - DVT ppx with SCDs at this time.    #Pre-diabetes  Patient diagnosed with pre-DM. Unknown last A1c. On Metformin 500mg bid at home.   - mISS and carb consistent diet  - Hold metformin in setting of hospitalization, insulin sliding scale PRN  - AM hbA1C 4.2 wnl, though may be falsely low in setting of anemia.    #Aortic stenosis  Per outpatient note by Dr. Wyatt ([facility]), Dr. Clarke of [facility] most recently performed echo in 8/21 demonstrating mild aortic stenosis and moderate LA dilation. Pt reports she no longer follows with Dr. Clarke and now follows with Dr. Booker.    #Paroxysmal atrial fibrillation.   Patient with history of paroxysmal afib after hip arthroplasty on  7/16/21. Post-op course complicated by infection and afib. Previously took Eliquis but discontinued 10/27 at [place] ED in the setting of anemia and with unknown source.  - Get further collateral from prior cardiologist (Dr. Clarke - [phone])  - EKG NSR, will get repeat EKG if patient feels palpitations or unwell.    #Constipation.   Patient without bowel movement in few days.   - Started on miralax 17g qd and senna 2tab qhs.    New medications/therapies:   New lines/hardware:  Labs to be followed outpatient:   Exam to be followed outpatient:         Discharge plan: discharge to home     71F with a h/o chronic cellulitis (follows Dr. Pearce of [facility] vascular surgery, and recently hospitalized at [place] on 11/16 dc'd on keflex 4x/day, course completed 2 days ago), bipolar, excoriation disorder, HTN, preDM, mild aortic stenosis, hypothyroidism (on synthroid and cytomel), hx of right hip surgery in 7/2021 c/b infection Afib, and DVT (previously on Eliquis but d/c'd i/s/o anemia), anemia (s/p blood transfusion at [facility] 10/27/21 where she gets most of her care and left AMA), referred by new cardiologist Dr. Booker's for worsening and unhealing LLE wound and infection admitted for management of cellulitis, complicated by fragmented outpatient care.     Problem List/Main Diagnoses:   #Cellulitis of lower limb.   Chronic problem since March/May 2021 managed with close follow up with Dr. Selena Pearce of [facility] vascular surgery. Pt most recently at [facility] on 11/16, discharged on Keflex 4x per day (finished 11/29), with recovery complicated by pt's removal of unna boot. On 12/1 pt saw new outpatient cardiologist Dr. Booker, who was concerned regarding LLE cellulitis and referred pt to [facility]. No leukocytosis, fever. On exam, L leg is tender, erythematous with multiple large open wounds and purulence. Dr. Orr reports cellulitis typically improves with Unna boot.   - S/p Vancomycin 1250mg x1 in ED, x2 on floor  - Patient refused vanc trough 12/3, attempt and f/u repeat vanc trough  - Initiate PO bactrim for 2 week course  - F/u wound care consult recs  - Outpatient follow up with Dr. Orr     #Anemia  Hgb on admission 7.2 , currently no signs of active bleeding. Patient says that her baseline currently is in the 8 range. In 6/2021 hgb was 11, with a drop to 8 in 10/18 on initial visit with Marianne Wyatt, who notified and deferred work-up to Tay Pink NP.  However, pt no longer sees NP, and is searching for a new PCP. She reports no anemia workup done thus far. On 10/27, she went to [facility] ED, received a RBC transfusion, started on daily iron supplements, and eliquis was discontinued in setting of anemia; however, though she signed out AMA. She currently denies any abdominal pain, hematochezia, melena, hemoptysis, hematuria, vaginal bleeding. Most recent colonoscopy and EGD was done 2 years ago.   - Iron panel: low Iron  - Hg dropped from 7.2 to 6.8, 1 unit pRBC transfused with appropriate response to 7.7   - maintain active T&S  - transfuse if Hgb <7   - c/w PO iron supplement  - Switch to PO protonix 40 qd  - If AM labs obtained and Hgb stable, defer to outpatient GI scope.    #Excoriation (skin-picking) disorder.   Patient with uncontrolled skin picking disorder with multiple excoriations over face, arms, abdomen, legs, and hips. Patient does not see a therapist and only manages with medications prescribed by Dr. Koffi Mora at [facility]. Pt endorses desire to continue care with them out patient.  - C/w home psych meds  - Psych consult recommends discussing polypharmacy issues with her outpatient provider, and suggest continuing Wellbutrin XL 300mg PO daily and venlafaxine 300mg PO daily for depressive symptoms, continuing Klonopin 3mg PO qHS and melatonin 5mg PO qHS for insomnia, and potentially Seroquel 75mg PO qHS PRN for insomnia if needed.    #Bipolar disorder, MDD, Anxiety Disorder, ADHD  ·  Plan: Patient with extensive psychiatric history, on multiple medications. Follows [facility] psychopharmacologist Dr. Koffi Mora.  - C/w home wellbutrin 300mg qd in AM, Topiramate 75mg qd, Seroquel 75mg qhs, Lithium 150mg qhs, Klonopin 3mg qhs, effexor  - AM lithium low at 0.1, fu AM topiramate level  - Holding methylphenidate for ADHD   - see above Psych recs    #Hypothyroidism.   Patient with hypothyroidism on synthroid and cytomel pt reports also assists with depression). Patient says she feels as though her synthroid dose needs to be adjusted because she has been gaining weight despite not having an appetite. Has not seen PCP in few months. TSH wnl.  - C/w home synthroid 112mcg, cytomel 5mcg    #DVT, lower extremity.   Patient with history of provoked DVT after hip surgery in the past. Was on Eliquis, but dc'd during 10/27 [place] [facility] visit at [facility] in setting of anemia/bleeding. SCDs used while inpatient.   - Optimal anticoagulation prophylaxis and control to be determined outpatient with Dr. Booker, following anemia workup.     #Pre-diabetes.   Patient diagnosed with pre-DM. On Metformin 500mg bid at home. HbA1C 4.2, wnl though may be falsely low in setting of anemia.  - mISS and carb consistent diet  - Resume home metformin 500mg BID on discharge    #Aortic stenosis.   Per outpatient note by Dr. Wyatt ([facility]), Dr. Clarke of [facility] most recently performed echo in 8/21 demonstrating mild aortic stenosis and moderate LA dilation. Pt reports she no longer follows with Dr. Clarke of [facility] and now follows with Dr. Booker of [facility].    #Paroxysmal atrial fibrillation.   Pt with history of paroxysmal afib after hip arthroplasty on  7/16/21. Previously took Eliquis but discontinued 10/27 at [facility] ED in the setting of anemia and with unknown source.   - EKG NSR,  - Optimal anticoagulation prophylaxis and control to be determined outpatient, following anemia workup.    #Constipation  - c/w on miralax 17g qd and senna 2tab qhs.    New medications/therapies: Bactrim extra-strength, BID for 2 weeks  New lines/hardware:  Labs to be followed outpatient:   Exam to be followed outpatient:    General: NAD; speaking in full sentences  HEENT: NC/AT; PERRL; EOMI; MMM  Neck: supple; no JVD  Cardiac: RRR; +S1/S2. Crescendo-decrescendo murmur loudest at the RUSB.  Pulm: CTA B/L; no W/R/R  GI: soft, NT/ND, +BS  Extremities: WWP; no edema, clubbing or cyanosis. LLE demonstrated diffuse, circumferential erythema of the lower leg and warmth to touch. Large, superficial exposed moist, draining wounds with roger purulence.   Vasc 2+ radial, DP, PT pulses B/L.  Derm: 2-3cm crusting, lesions of the right eye brow, lateral cheek, and scattered across abdomen. Diffusely scattered macules and patches of erythema.   Neuro: AAOx3; no focal deficits       Discharge plan: discharge to home     71F with a h/o chronic cellulitis (follows Dr. Pearce of [facility] vascular surgery, and recently hospitalized at [facility] on 11/16 dc'd on keflex 4x/day, course completed 2 days ago), bipolar, excoriation disorder, HTN, preDM, mild aortic stenosis, hypothyroidism (on synthroid and cytomel), hx of right hip surgery in 7/2021 c/b infection Afib, and DVT (previously on Eliquis but d/c'd i/s/o anemia), anemia (s/p blood transfusion at [facility] 10/27/21 where she gets most of her care and left AMA), referred by new cardiologist Dr. Booker's for worsening and unhealing LLE wound and infection admitted for management of cellulitis, complicated by fragmented outpatient care.     Problem List/Main Diagnoses:   #Cellulitis of lower limb.   Chronic problem since March/May 2021 managed with close follow up with Dr. Selena Pearce of [facility] vascular surgery. Pt most recently at [facility] on 11/16, discharged on Keflex 4x per day (finished 11/29), with recovery complicated by pt's removal of unna boot. On 12/1 pt saw new outpatient cardiologist Dr. Booker, who was concerned regarding LLE cellulitis and referred pt to [facility]. No leukocytosis, fever. On exam, L leg is tender, erythematous with multiple large open wounds and purulence. Dr. Orr reports cellulitis typically improves with Unna boot.   - S/p Vancomycin 1250mg x1 in ED, x2 on floor  - Patient refused vanc trough 12/3, attempt and f/u repeat vanc trough  - Initiate PO bactrim for 2 week course  - F/u wound care consult recs  - Outpatient follow up with Dr. Orr     #Anemia  Hgb on admission 7.2 , currently no signs of active bleeding. Patient says that her baseline currently is in the 8 range. In 6/2021 hgb was 11, with a drop to 8 in 10/18 on initial visit with Marianne Wyatt, who notified and deferred work-up to Tay Pink NP.  However, pt no longer sees NP, and is searching for a new PCP. She reports no anemia workup done thus far. On 10/27, she went to [facility] ED, received a RBC transfusion, started on daily iron supplements, and eliquis was discontinued in setting of anemia; however, though she signed out AMA. She currently denies any abdominal pain, hematochezia, melena, hemoptysis, hematuria, vaginal bleeding. Most recent colonoscopy and EGD was done 2 years ago.   - Iron panel: low Iron  - Hg dropped from 7.2 to 6.8, 1 unit pRBC transfused with appropriate response to 7.7   - maintain active T&S  - transfuse if Hgb <7   - c/w PO iron supplement  - Switch to PO protonix 40 qd  - If AM labs obtained and Hgb stable, defer to outpatient GI scope.    #Excoriation (skin-picking) disorder.   Patient with uncontrolled skin picking disorder with multiple excoriations over face, arms, abdomen, legs, and hips. Patient does not see a therapist and only manages with medications prescribed by Dr. Koffi Mora at [facility]. Pt endorses desire to continue care with them out patient.  - C/w home psych meds  - Psych consult recommends discussing polypharmacy issues with her outpatient provider, and suggest continuing Wellbutrin XL 300mg PO daily and venlafaxine 300mg PO daily for depressive symptoms, continuing Klonopin 3mg PO qHS and melatonin 5mg PO qHS for insomnia, and potentially Seroquel 75mg PO qHS PRN for insomnia if needed.    #Bipolar disorder, MDD, Anxiety Disorder, ADHD  ·  Plan: Patient with extensive psychiatric history, on multiple medications. Follows [facility] psychopharmacologist Dr. Koffi Mora.  - C/w home wellbutrin 300mg qd in AM, Topiramate 75mg qd, Seroquel 75mg qhs, Lithium 150mg qhs, Klonopin 3mg qhs, effexor  - AM lithium low at 0.1, fu AM topiramate level  - Holding methylphenidate for ADHD   - see above Psych recs    #Hypothyroidism.   Patient with hypothyroidism on synthroid and cytomel pt reports also assists with depression). Patient says she feels as though her synthroid dose needs to be adjusted because she has been gaining weight despite not having an appetite. Has not seen PCP in few months. TSH wnl.  - C/w home synthroid 112mcg, cytomel 5mcg    #DVT, lower extremity.   Patient with history of provoked DVT after hip surgery in the past. Was on Eliquis, but dc'd during 10/27 [facility] ED visit at [facility] in setting of anemia/bleeding. SCDs used while inpatient.   - Optimal anticoagulation prophylaxis and control to be determined outpatient with Dr. Booker, following anemia workup.     #Pre-diabetes.   Patient diagnosed with pre-DM. On Metformin 500mg bid at home. HbA1C 4.2, wnl though may be falsely low in setting of anemia.  - mISS and carb consistent diet  - Resume home metformin 500mg BID on discharge    #Aortic stenosis.   Per outpatient note by Dr. Wyatt ([facility]), Dr. Clarke of [facility] most recently performed echo in 8/21 demonstrating mild aortic stenosis and moderate LA dilation. Pt reports she no longer follows with Dr. Clarke of [facility] and now follows with Dr. Booker of [facility].    #Paroxysmal atrial fibrillation.   Pt with history of paroxysmal afib after hip arthroplasty on  7/16/21. Previously took Eliquis but discontinued 10/27 at [facility] ED in the setting of anemia and with unknown source.   - EKG NSR,  - Optimal anticoagulation prophylaxis and control to be determined outpatient, following anemia workup.    #Constipation  - c/w on miralax 17g qd and senna 2tab qhs.    New medications/therapies: Bactrim extra-strength, BID for 2 weeks  New lines/hardware: None  Labs to be followed outpatient: None   Exam to be followed outpatient: lower extremity evaluation    General: NAD; speaking in full sentences  HEENT: NC/AT; PERRL; EOMI; MMM  Neck: supple; no JVD  Cardiac: RRR; +S1/S2. Crescendo-decrescendo murmur loudest at the RUSB.  Pulm: CTA B/L; no W/R/R  GI: soft, NT/ND, +BS  Extremities: WWP; no edema, clubbing or cyanosis. LLE demonstrated diffuse, circumferential erythema of the lower leg and warmth to touch. Large, superficial exposed moist, draining wounds with roger purulence.   Vasc 2+ radial, DP, PT pulses B/L.  Derm: 2-3cm crusting, lesions of the right eye brow, lateral cheek, and scattered across abdomen. Diffusely scattered macules and patches of erythema.   Neuro: AAOx3; no focal deficits       Discharge plan: discharge to home     71F with a h/o chronic cellulitis (follows Dr. Pearce of [facility] vascular surgery, and recently hospitalized at [place] on 11/16 dc'd on keflex 4x/day, course completed 2 days ago), bipolar, excoriation disorder, HTN, preDM, mild aortic stenosis, hypothyroidism (on synthroid and cytomel), hx of right hip surgery in 7/2021 c/b infection Afib, and DVT (previously on Eliquis but d/c'd i/s/o anemia), anemia (s/p blood transfusion at [facility] 10/27/21 where she gets most of her care and left AMA), referred by new cardiologist Dr. Booker's for worsening and unhealing LLE wound and infection admitted for management of cellulitis, complicated by fragmented outpatient care.     Problem List/Main Diagnoses:   #Cellulitis of lower limb.   Chronic problem since March/May 2021 managed with close follow up with Dr. Selena Pearce of [facility] vascular surgery. Pt most recently at [facility] on 11/16, discharged on Keflex 4x per day (finished 11/29), with recovery complicated by pt's removal of unna boot. On 12/1 pt saw new outpatient cardiologist Dr. Booker, who was concerned regarding LLE cellulitis and referred pt to [facility]. No leukocytosis, fever. On exam, L leg is tender, erythematous with multiple large open wounds and purulence. Dr. Orr reports cellulitis typically improves with Unna boot.   - S/p Vancomycin 1250mg x1 in ED, x2 on floor  - Patient refused vanc trough 12/3, attempt and f/u repeat vanc trough  - Initiate PO bactrim for 2 week course  - F/u wound care consult recs  - Outpatient follow up with Dr. Orr     #Anemia  Hgb on admission 7.2 , currently no signs of active bleeding. Patient says that her baseline currently is in the 8 range. In 6/2021 hgb was 11, with a drop to 8 in 10/18 on initial visit with Marianne Wyatt, who notified and deferred work-up to Tay Pink NP.  However, pt no longer sees NP, and is searching for a new PCP. She reports no anemia workup done thus far. On 10/27, she went to [facility] ED, received a RBC transfusion, started on daily iron supplements, and eliquis was discontinued in setting of anemia; however, though she signed out AMA. She currently denies any abdominal pain, hematochezia, melena, hemoptysis, hematuria, vaginal bleeding. Most recent colonoscopy and EGD was done 2 years ago.   - Iron panel revealed low iron, continue PO iron supplement  - Hg dropped from 7.2 to 6.8, 1 unit pRBC transfused with appropriate response to 7.7. On day of discharge, Hg stable at 8.4.   - maintain active T&S  - transfuse if Hgb <7   - Switch to PO protonix 40 qd  - If AM labs obtained and Hgb stable, defer to outpatient GI scope.    #Excoriation (skin-picking) disorder.   Patient with uncontrolled skin picking disorder with multiple excoriations over face, arms, abdomen, legs, and hips. Patient does not see a therapist and only manages with medications prescribed by Dr. Koffi Mora at [facility]. Pt endorses desire to continue care with them out patient.  - C/w home psych meds  - Psych consult recommends discussing polypharmacy issues with her outpatient provider, and suggest continuing Wellbutrin XL 300mg PO daily and venlafaxine 300mg PO daily for depressive symptoms, continuing Klonopin 3mg PO qHS and melatonin 5mg PO qHS for insomnia, and potentially Seroquel 75mg PO qHS PRN for insomnia if needed.    #Bipolar disorder, MDD, Anxiety Disorder, ADHD  ·  Plan: Patient with extensive psychiatric history, on multiple medications. Follows [facility] psychopharmacologist Dr. Koffi Mora.  - C/w home wellbutrin 300mg qd in AM, Topiramate 75mg qd, Seroquel 75mg qhs, Lithium 150mg qhs, Klonopin 3mg qhs, effexor  - AM lithium low at 0.1, fu AM topiramate level  - Holding methylphenidate for ADHD   - see above Psych recs    #Hypothyroidism.   Patient with hypothyroidism on synthroid and cytomel pt reports also assists with depression). Patient says she feels as though her synthroid dose needs to be adjusted because she has been gaining weight despite not having an appetite. Has not seen PCP in few months. TSH wnl.  - C/w home synthroid 112mcg, cytomel 5mcg    #DVT, lower extremity.   Patient with history of provoked DVT after hip surgery in the past. Was on Eliquis, but dc'd during 10/27 [place] ED visit at [place] in setting of anemia/bleeding. SCDs used while inpatient.   - Optimal anticoagulation prophylaxis and control to be determined outpatient with Dr. Booker, following anemia workup.     #Pre-diabetes.   Patient diagnosed with pre-DM. On Metformin 500mg bid at home. HbA1C 4.2, wnl though may be falsely low in setting of anemia.  - mISS and carb consistent diet  - Resume home metformin 500mg BID on discharge    #Aortic stenosis.   Per outpatient note by Dr. Wyatt ([facility]), Dr. Clarke of [facility] most recently performed echo in 8/21 demonstrating mild aortic stenosis and moderate LA dilation. Pt reports she no longer follows with Dr. Clarke of [facility] and now follows with Dr. Booker of [facility].    #Paroxysmal atrial fibrillation.   Pt with history of paroxysmal afib after hip arthroplasty on  7/16/21. Previously took Eliquis but discontinued 10/27 at [place] ED in the setting of anemia and with unknown source.   - EKG NSR,  - Optimal anticoagulation prophylaxis and control to be determined outpatient, following anemia workup.    #Constipation  - c/w on miralax 17g qd and senna 2tab qhs.    New medications/therapies: Bactrim extra-strength, BID for 2 weeks  New lines/hardware: None  Labs to be followed outpatient: None   Exam to be followed outpatient: lower extremity evaluation    General: NAD; speaking in full sentences  HEENT: NC/AT; PERRL; EOMI; MMM  Neck: supple; no JVD  Cardiac: RRR; +S1/S2. Crescendo-decrescendo murmur loudest at the RUSB.  Pulm: CTA B/L; no W/R/R  GI: soft, NT/ND, +BS  Extremities: WWP; no edema, clubbing or cyanosis. LLE demonstrated diffuse, circumferential erythema of the lower leg and warmth to touch. Large, superficial exposed moist, draining wounds with roger purulence.   Vasc 2+ radial, DP, PT pulses B/L.  Derm: 2-3cm crusting, lesions of the right eye brow, lateral cheek, and scattered across abdomen. Diffusely scattered macules and patches of erythema.   Neuro: AAOx3; no focal deficits       Discharge plan: discharge to home     71F with a h/o chronic cellulitis (follows Dr. Pearce of [facility] vascular surgery, and recently hospitalized at [facility] on 11/16 dc'd on keflex 4x/day, course completed 2 days ago), bipolar, excoriation disorder, HTN, preDM, mild aortic stenosis, hypothyroidism (on synthroid and cytomel), hx of right hip surgery in 7/2021 c/b infection Afib, and DVT (previously on Eliquis but d/c'd i/s/o anemia), anemia (s/p blood transfusion at [facility] 10/27/21 where she gets most of her care and left AMA), referred by new cardiologist Dr. Booker's for worsening and unhealing LLE wound and infection admitted for management of cellulitis, complicated by fragmented outpatient care.     Problem List/Main Diagnoses:   #Cellulitis of lower limb.   Chronic problem since March/May 2021 managed with close follow up with Dr. Selena Pearce of [facility] vascular surgery. Pt most recently at [facility] on 11/16, discharged on Keflex 4x per day (finished 11/29), with recovery complicated by pt's removal of unna boot. On 12/1 pt saw new outpatient cardiologist Dr. Booker, who was concerned regarding LLE cellulitis and referred pt to [facility]. No leukocytosis, fever. On exam, L leg is tender, erythematous with multiple large open wounds and purulence. Dr. Orr reports cellulitis typically improves with Unna boot.   - S/p Vancomycin 1250mg x1 in ED, x2 on floor, with vanc trough  - Initiate PO bactrim for 2 week course  - F/u wound care consult recs  - Outpatient follow up with Dr. Orr     #Anemia  Hgb on admission 7.2 , currently no signs of active bleeding. Patient says that her baseline currently is in the 8 range. In 6/2021 hgb was 11, with a drop to 8 in 10/18 on initial visit with Marianne Wyatt, who notified and deferred work-up to Tay Pink NP.  However, pt no longer sees NP, and is searching for a new PCP. She reports no anemia workup done thus far. On 10/27, she went to [facility] ED, received a RBC transfusion, started on daily iron supplements, and eliquis was discontinued in setting of anemia; however, though she signed out AMA. She currently denies any abdominal pain, hematochezia, melena, hemoptysis, hematuria, vaginal bleeding. Most recent colonoscopy and EGD was done 2 years ago.   - Iron panel revealed low iron, continue PO iron supplement  - Hg dropped from 7.2 to 6.8, 1 unit pRBC transfused with appropriate response to 7.7. On day of discharge, Hg stable at 8.4.   - c/w PO protonix 40 qd  - F/U outpatient GI for scope to assess for potential sources of bleeding. FOBT not done in the setting of infection/hospitalization.    #Excoriation (skin-picking) disorder.   Patient with uncontrolled skin picking disorder with multiple excoriations over face, arms, abdomen, legs, and hips. Patient does not see a therapist and only manages with medications prescribed by Dr. Koffi Mora at [facility]. Pt endorses desire to continue care with them out patient.  - C/w home psych meds  - Psych consult recommends discussing polypharmacy issues with her outpatient provider, and suggest continuing Wellbutrin XL 300mg PO daily and venlafaxine 300mg PO daily for depressive symptoms, continuing Klonopin 3mg PO qHS and melatonin 5mg PO qHS for insomnia, and potentially Seroquel 75mg PO qHS PRN for insomnia if needed.    #Bipolar disorder, MDD, Anxiety Disorder, ADHD  ·  Plan: Patient with extensive psychiatric history, on multiple medications. Follows [facility] [place] psychopharmacologist Dr. Koffi Mora.  - C/w home wellbutrin 300mg qd in AM, Topiramate 75mg qd, Seroquel 75mg qhs, Lithium 150mg qhs, Klonopin 3mg qhs, effexor  - AM lithium low at 0.1, fu AM topiramate level  - Holding methylphenidate for ADHD   - see above Psych recs    #Hypothyroidism.   Patient with hypothyroidism on synthroid and cytomel pt reports also assists with depression). Patient says she feels as though her synthroid dose needs to be adjusted because she has been gaining weight despite not having an appetite. Has not seen PCP in few months. TSH wnl.  - C/w home synthroid 112mcg, cytomel 5mcg    #DVT, lower extremity.   Patient with history of provoked DVT after hip surgery in the past. Was on Eliquis, but dc'd during 10/27 [place] [facility] visit at [place] in setting of anemia/bleeding. SCDs used while inpatient.   - Optimal anticoagulation prophylaxis and control to be determined outpatient with Dr. Booker, following anemia workup.     #Pre-diabetes.   Patient diagnosed with pre-DM. On Metformin 500mg bid at home. HbA1C 4.2, wnl though may be falsely low in setting of anemia.  - mISS and carb consistent diet  - Resume home metformin 500mg BID on discharge    #Aortic stenosis.   Per outpatient note by Dr. Wyatt ([facility]), Dr. Clarke of [facility] most recently performed echo in 8/21 demonstrating mild aortic stenosis and moderate LA dilation. Pt reports she no longer follows with Dr. Clarke of [facility] and now follows with Dr. Booker of [facility].    #Paroxysmal atrial fibrillation.   Pt with history of paroxysmal afib after hip arthroplasty on  7/16/21. Previously took Eliquis but discontinued 10/27 at [place] ED in the setting of anemia and with unknown source.   - EKG NSR,  - Optimal anticoagulation prophylaxis and control to be determined outpatient, following anemia workup.    #Constipation  - c/w on miralax 17g qd and senna 2tab qhs.    New medications/therapies: Bactrim extra-strength, BID for 2 weeks  New lines/hardware: None  Labs to be followed outpatient: None   Exam to be followed outpatient: lower extremity evaluation    General: NAD; speaking in full sentences  HEENT: NC/AT; PERRL; EOMI; MMM  Neck: supple; no JVD  Cardiac: RRR; +S1/S2. Crescendo-decrescendo murmur loudest at the RUSB.  Pulm: CTA B/L; no W/R/R  GI: soft, NT/ND, +BS  Extremities: WWP; no edema, clubbing or cyanosis. LLE demonstrated diffuse, circumferential erythema of the lower leg and warmth to touch. Large, superficial exposed moist, draining wounds with roger purulence.   Vasc 2+ radial, DP, PT pulses B/L.  Derm: 2-3cm crusting, lesions of the right eye brow, lateral cheek, and scattered across abdomen. Diffusely scattered macules and patches of erythema.   Neuro: AAOx3; no focal deficits       Discharge plan: discharge to home     71F with a h/o chronic cellulitis (follows Dr. Pearce of [facility] vascular surgery, and recently hospitalized at [facility] on 11/16 dc'd on keflex 4x/day, course completed 2 days ago), bipolar, excoriation disorder, HTN, preDM, mild aortic stenosis, hypothyroidism (on synthroid and cytomel), hx of right hip surgery in 7/2021 c/b infection Afib, and DVT (previously on Eliquis but d/c'd i/s/o anemia), anemia (s/p blood transfusion at [facility] 10/27/21 where she gets most of her care and left AMA), referred by new cardiologist Dr. Booker's for worsening and unhealing LLE wound and infection admitted for management of cellulitis, complicated by fragmented outpatient care.     Problem List/Main Diagnoses:   #Cellulitis of lower limb.   Chronic problem since March/May 2021 managed with close follow up with Dr. Selena Pearce of [facility] vascular surgery. Pt most recently at [facility] on 11/16, discharged on Keflex 4x per day (finished 11/29), with recovery complicated by pt's removal of unna boot. On 12/1 pt saw new outpatient cardiologist Dr. Booker, who was concerned regarding LLE cellulitis and referred pt to [facility]. No leukocytosis, fever. On exam, L leg is tender, erythematous with multiple large open wounds and purulence. Dr. Orr reports cellulitis typically improves with Unna boot.   - S/p Vancomycin 1250mg x1 in ED, x2 on floor, with vanc trough  - Initiate PO bactrim for 2 week course  - F/u wound care consult recs  - Outpatient follow up with Dr. Orr     #Anemia  Hgb on admission 7.2 , currently no signs of active bleeding. Patient says that her baseline currently is in the 8 range. In 6/2021 hgb was 11, with a drop to 8 in 10/18 on initial visit with Marianne Wyatt, who notified and deferred work-up to Tay Pink NP.  However, pt no longer sees NP, and is searching for a new PCP. She reports no anemia workup done thus far. On 10/27, she went to [facility] ED, received a RBC transfusion, started on daily iron supplements, and eliquis was discontinued in setting of anemia; however, though she signed out AMA. She currently denies any abdominal pain, hematochezia, melena, hemoptysis, hematuria, vaginal bleeding. Most recent colonoscopy and EGD was done 2 years ago.   - Iron panel revealed low iron, continue PO iron supplement  - Hg dropped from 7.2 to 6.8, 1 unit pRBC transfused with appropriate response to 7.7. On day of discharge, Hg stable at 8.4.   - c/w PO protonix 40 qd  - F/U outpatient GI for scope to assess for potential sources of bleeding. FOBT not done in the setting of infection/hospitalization.    #Excoriation (skin-picking) disorder.   Patient with uncontrolled skin picking disorder with multiple excoriations over face, arms, abdomen, legs, and hips. Patient does not see a therapist and only manages with medications prescribed by Dr. Koffi Mora at [facility]. Pt endorses desire to continue care with them out patient.  - C/w home psych meds  - Psych consult recommends discussing polypharmacy issues with her outpatient provider, and suggest continuing Wellbutrin XL 300mg PO daily and venlafaxine 300mg PO daily for depressive symptoms, continuing Klonopin 3mg PO qHS and melatonin 5mg PO qHS for insomnia, and potentially Seroquel 75mg PO qHS PRN for insomnia if needed.    #Bipolar disorder, MDD, Anxiety Disorder, ADHD  ·  Plan: Patient with extensive psychiatric history, on multiple medications. Follows [facility] psychopharmacologist Dr. Koffi Mora.  - C/w home wellbutrin 300mg qd in AM, Topiramate 75mg qd, Seroquel 75mg qhs, Lithium 150mg qhs, Klonopin 3mg qhs, effexor  - AM lithium low at 0.1, fu AM topiramate level  - Holding methylphenidate for ADHD   - see above Psych recs    #Hypothyroidism.   Patient with hypothyroidism on synthroid and cytomel pt reports also assists with depression). Patient says she feels as though her synthroid dose needs to be adjusted because she has been gaining weight despite not having an appetite. Has not seen PCP in few months. TSH wnl.  - C/w home synthroid 112mcg, cytomel 5mcg    #DVT, lower extremity.   Patient with history of provoked DVT after hip surgery in the past. Was on Eliquis, but dc'd during 10/27 [place] [facility] visit at [place] in setting of anemia/bleeding. SCDs used while inpatient.   - Optimal anticoagulation prophylaxis and control to be determined outpatient with Dr. Booker, following anemia workup.     #Pre-diabetes.   Patient diagnosed with pre-DM. On Metformin 500mg bid at home. HbA1C 4.2, wnl though may be falsely low in setting of anemia.  - mISS and carb consistent diet  - Resume home metformin 500mg BID on discharge    #Aortic stenosis.   Per outpatient note by Dr. Wyatt ([facility]), Dr. Clarke of [facility] most recently performed echo in 8/21 demonstrating mild aortic stenosis and moderate LA dilation. Pt reports she no longer follows with Dr. Clarke of [facility] and now follows with Dr. Booker of [facility].    #Paroxysmal atrial fibrillation.   Pt with history of paroxysmal afib after hip arthroplasty on  7/16/21. Previously took Eliquis but discontinued 10/27 at [place] ED in the setting of anemia and with unknown source.   - EKG NSR,  - Optimal anticoagulation prophylaxis and control to be determined outpatient, following anemia workup.    #Constipation  - c/w on miralax 17g qd and senna 2tab qhs.    New medications/therapies: Bactrim extra-strength, BID for 2 weeks  New lines/hardware: None  Labs to be followed outpatient: None   Exam to be followed outpatient: lower extremity evaluation    General: NAD; speaking in full sentences  HEENT: NC/AT; PERRL; EOMI; MMM  Neck: supple; no JVD  Cardiac: RRR; +S1/S2. Crescendo-decrescendo murmur loudest at the RUSB.  Pulm: CTA B/L; no W/R/R  GI: soft, NT/ND, +BS  Extremities: WWP; no edema, clubbing or cyanosis. LLE demonstrated diffuse, circumferential erythema of the lower leg and warmth to touch. Large, superficial exposed moist, draining wounds with roger purulence.   Vasc 2+ radial, DP, PT pulses B/L.  Derm: 2-3cm crusting, lesions of the right eye brow, lateral cheek, and scattered across abdomen. Diffusely scattered macules and patches of erythema.   Neuro: AAOx3; no focal deficits    Discharge plan: discharge to home

## 2021-12-02 NOTE — PROGRESS NOTE ADULT - PROBLEM SELECTOR PLAN 9
Patient with history of paroxysmal afib after hip arthroplasty in the past. Post-op course complicated by infection and afib. Previously took Eliquis but discontinued in the setting of anemia and unknown source.   - Get further collateral from cardiologist  - EKG NSR, will get repeat EKG if patient feels palpitations or unwell Patient with history of paroxysmal afib after hip arthroplasty in the past. Post-op course complicated by infection and afib. Previously took Eliquis but discontinued in the setting of anemia and unknown source. Amiodarone script for 30 days was found.    - Get further collateral from cardiologist  - EKG NSR, will get repeat EKG if patient feels palpitations or unwell Patient with history of paroxysmal afib after hip arthroplasty in the past. Post-op course complicated by infection and afib. Previously took Eliquis but discontinued 10/27 in the setting of anemia and unknown source.   - Get further collateral from cardiologist  - EKG NSR, will get repeat EKG if patient feels palpitations or unwell Patient with history of paroxysmal afib after hip arthroplasty in the past. Post-op course complicated by infection and afib. Previously took Eliquis but discontinued 10/27 in the setting of anemia and unknown source at [place] ED on 10/27.   - Get further collateral from cardiologist  - EKG NSR, will get repeat EKG if patient feels palpitations or unwell Patient with history of paroxysmal afib after hip arthroplasty in the past. Post-op course complicated by infection and afib. Previously took Eliquis but discontinued 10/27 in the setting of anemia and unknown source at [place] ED on 10/27.   - Get further collateral from prior cardiologist (Dr. Clarke - [phone])  - EKG NSR, will get repeat EKG if patient feels palpitations or unwell Patient with history of paroxysmal afib after hip arthroplasty on  7/16/21. Post-op course complicated by infection and afib. Previously took Eliquis but discontinued 10/27 at [place] ED in the setting of anemia and with unknown source.  - Get further collateral from prior cardiologist (Dr. Clarke - [phone])  - Per Dr. Clarke (prior [facility] cardiologist) office, experienced atrial fibrillation during hospital course  - EKG NSR, will get repeat EKG if patient feels palpitations or unwell Patient with history of paroxysmal afib after hip arthroplasty on  7/16/21. Post-op course complicated by infection and afib. Previously took Eliquis but discontinued 10/27 at [place] ED in the setting of anemia and with unknown source.  - Get further collateral from prior cardiologist (Dr. Clarke - [phone])  - EKG NSR, will get repeat EKG if patient feels palpitations or unwell

## 2021-12-02 NOTE — PROGRESS NOTE ADULT - PROBLEM SELECTOR PLAN 2
Hgb on admission 7.2 , currently no signs of active bleeding (no hematochezia, melena, hemoptysis, hematuria). Patient says that her baseline currently is in the 8 range. In 6/2021 hgb was 11 and started falling off since. Patient states that workup hasn't found a source, however most recent colonoscopy and EGD was done 2 years ago. Patient takes daily iron supplement  - obtain iron panel  - trend CBC  - maintain active T&S  - transfuse if Hgb <7   - Started on protonix 40 qd  - F/u FOBT Hgb on admission 7.2 , currently no signs of active bleeding (no hematochezia, melena, hemoptysis, hematuria, vaginal bleeding). Patient says that her baseline currently is in the 8 range. In 6/2021 hgb was 11, with a drop to 8 in 10/21. Reports there was no workup done by her outpatient providers. Denies any abdominal pain. Most recent colonoscopy and EGD was done 2 years ago. Patient takes daily iron supplement.  - Iron panel: low Iron  - Hg dropped from 7.2 to 6.8 this AM, now s/p 1 unit pRBC  - fu RBC  - maintain active T&S  - transfuse if Hgb <7   - Started on protonix 40 qd Hgb on admission 7.2 , currently no signs of active bleeding. Patient says that her baseline currently is in the 8 range. In 6/2021 hgb was 11, with a drop to 8 in 10/18 on initial visit with Marianne Silva (Dr. Wyatt), who notified and deferred work-up to PCP (Tay Pink NP). However, pt reports her PCP has retired and is searching for a new provider and says there was no anemia workup done  thus far. She went to [facility] ED on 10/27, received a RBC transfusion, started on iron supplements, and discontinued eliquis in setting of anemia; she signed out AMA. At this time, denies any abdominal pain, hematochezia, melena, hemoptysis, hematuria, vaginal bleeding. Most recent colonoscopy and EGD was done 2 years ago. Patient takes daily iron supplement.  - Iron panel: low Iron  - Hg dropped from 7.2 to 6.8 this AM, now s/p 1 unit pRBC  - fu RBC  - maintain active T&S  - transfuse if Hgb <7   - Started on protonix 40 qd Hgb on admission 7.2 , currently no signs of active bleeding. Patient says that her baseline currently is in the 8 range. In 6/2021 hgb was 11, with a drop to 8 in 10/18 on initial visit with Marianne Silva (Dr. Wyatt), who notified and deferred work-up to Tay Pink NP, her PCP at the time However, pt no longer sees NP, and is searching for a new provider and says there was no anemia workup done thus far. She went to [facility] ED on 10/27, received a RBC transfusion, started on iron supplements, and discontinued eliquis in setting of anemia; she signed out AMA. At this time, denies any abdominal pain, hematochezia, melena, hemoptysis, hematuria, vaginal bleeding. Most recent colonoscopy and EGD was done 2 years ago. Patient takes daily iron supplement.  - Iron panel: low Iron  - Hg dropped from 7.2 to 6.8 this AM, now s/p 1 unit pRBC  - fu RBC  - maintain active T&S  - transfuse if Hgb <7   - Started on protonix 40 qd Hgb on admission 7.2 , currently no signs of active bleeding. Patient says that her baseline currently is in the 8 range. In 6/2021 hgb was 11, with a drop to 8 in 10/18 on initial visit with Marianne Silva (Dr. Wyatt), who notified and deferred work-up to Tay Pink NP.  However, pt no longer sees NP, and is searching for a new provider. She report no anemia workup done thus far. On 10/27, she went to [facility] ED, received a RBC transfusion, started on daily iron supplements, and eliquis was discontinued in setting of anemia, though she signed out AMA. She currently denies any abdominal pain, hematochezia, melena, hemoptysis, hematuria, vaginal bleeding. Most recent colonoscopy and EGD was done 2 years ago.   - Iron panel: low Iron  - Hg dropped from 7.2 to 6.8 this AM, now s/p 1 unit pRBC  - fu RBC  - maintain active T&S  - transfuse if Hgb <7   - Started on protonix 40 qd

## 2021-12-02 NOTE — PROCEDURE NOTE - NSICDXPROCEDURE_GEN_ALL_CORE_FT
PROCEDURES:  Insertion of intravenous catheter with ultrasound guidance 02-Dec-2021 16:59:39  Carroll Lowry

## 2021-12-02 NOTE — PROGRESS NOTE ADULT - PROBLEM SELECTOR PLAN 8
Questionable history in previous note about aortic stenosis. Patient unaware of diagnosis.   - Likely will need repeat outpatient TTE Questionable history in previous note about aortic stenosis. Patient unaware of diagnosis.   - Likely will need repeat outpatient TTE  - Obtain collateral history with her cardiologist Per outpatient note by Dr. Wyatt ([facility]), Dr. Clarke of [facility] most recently performed echo in 8/21 demonstrating mild aortic stenosis and moderate LA dilation. Per outpatient note by Dr. Wyatt ([facility]), Dr. Clarke of [facility] most recently performed echo in 8/21 demonstrating mild aortic stenosis and moderate LA dilation. Pt reports she no longer follows with Dr. Clarke and now follows with Dr. Booker.

## 2021-12-02 NOTE — PROGRESS NOTE ADULT - ASSESSMENT
71F with a h/o Bipolar, excoriation disorder, HTN, preDM, aortic stenosis (unclear past finding), hypothyroidism (on synthroid and cytomel), hx of right hip surgery c/b infection, Afib, and DVT (previously on Eliquis but d/c'd i/s/o anemia), anemia (s/p blood transfusion at [facility] where she gets most of her care and reportedly left AMA), LLE cellulitis (recently hospitalized and d/c'd on keflex and another abx-completed 2 days ago) referred by cardiologist Dr. Scanlons (follows for wound care and unna boot) for worsening and unhealing LLE wound and infection admitted for management of cellulitis.  71F with a h/o chronic cellulitis (follows Dr. Orr of [facility] vascular surgery, and recently hospitalized at [place] on 11/16 dc'd on keflex 4x/day), bipolar, excoriation disorder, HTN, preDM, mild aortic stenosis, hypothyroidism (on synthroid and cytomel), hx of right hip surgery in 7/2021 c/b infection, Afib, and DVT (previously on Eliquis but d/c'd i/s/o anemia), anemia (s/p blood transfusion at [facility] 10/27/21 where she gets most of her care and left AMA), referred by new cardiologist Dr. Roy for worsening and unhealing LLE wound and infection admitted for management of cellulitis.        71F with a h/o chronic cellulitis (follows Dr. Pearce of [facility] vascular surgery, and recently hospitalized at [place] on 11/16 dc'd on keflex 4x/day, course completed 2 days ago), bipolar, excoriation disorder, HTN, preDM, mild aortic stenosis, hypothyroidism (on synthroid and cytomel), hx of right hip surgery in 7/2021 c/b infection Afib, and DVT (previously on Eliquis but d/c'd i/s/o anemia), anemia (s/p blood transfusion at [facility] 10/27/21 where she gets most of her care and left AMA), referred by new cardiologist Dr. Roy for worsening and unhealing LLE wound and infection admitted for management of cellulitis.

## 2021-12-02 NOTE — DISCHARGE NOTE PROVIDER - NSDCMRMEDTOKEN_GEN_ALL_CORE_FT
Concerta 27 mg/24 hr oral tablet, extended release: 1 tab(s) orally once a day (in the morning)  Cytomel 5 mcg oral tablet: 1 tab(s) orally once a day  Deplin 7.5 mg oral tablet: 1 tab(s) orally once a day  Effexor 100 mg oral tablet: 3 tab(s) orally once a day  KlonoPIN 1 mg oral tablet: 3 tab(s) orally once a day (at bedtime)  lithium carbonate extended release: 150 milligram(s) orally once a day (at bedtime)  Melatonin 10 mg oral tablet, disintegratin tab(s) orally once a day (at bedtime)  metFORMIN 500 mg oral tablet: 1 tab(s) orally 2 times a day  SEROquel 25 mg oral tablet: 3 tab(s) orally once a day (at bedtime)  Synthroid 112 mcg (0.112 mg) oral tablet: 1 tab(s) orally once a day  topiramate 25 mg oral tablet: 3 tab(s) orally once a day  Wellbutrin 100 mg oral tablet: 3 tab(s) orally once a day (in the morning)   Bactrim  mg-160 mg oral tablet: 1 tab(s) orally every 12 hours  Concerta 27 mg/24 hr oral tablet, extended release: 1 tab(s) orally once a day (in the morning)  Cytomel 5 mcg oral tablet: 1 tab(s) orally once a day  Deplin 7.5 mg oral tablet: 1 tab(s) orally once a day  Effexor 100 mg oral tablet: 3 tab(s) orally once a day  KlonoPIN 1 mg oral tablet: 3 tab(s) orally once a day (at bedtime)  lithium carbonate extended release: 150 milligram(s) orally once a day (at bedtime)  Melatonin 10 mg oral tablet, disintegratin tab(s) orally once a day (at bedtime)  metFORMIN 500 mg oral tablet: 1 tab(s) orally 2 times a day  SEROquel 25 mg oral tablet: 3 tab(s) orally once a day (at bedtime)  Synthroid 112 mcg (0.112 mg) oral tablet: 1 tab(s) orally once a day  topiramate 25 mg oral tablet: 3 tab(s) orally once a day  Wellbutrin 100 mg oral tablet: 3 tab(s) orally once a day (in the morning)

## 2021-12-02 NOTE — DISCHARGE NOTE PROVIDER - NSDCFUSCHEDAPPT_GEN_ALL_CORE_FT
RAFA DONAHUE ; 01/04/2022 ; NPP Cardio Vasc [address]  RAFA DONAHUE ; 01/04/2022 ; NPP Cardio Vasc [address] RAFA DONAHUE ; 12/15/2021 ; P Cardio Vasc [address]  RAFA DONAHUE ; 01/04/2022 ; \[facility]"" Cardio Vasc [address]  RAFA DONAHUE ; 01/04/2022 ; \[facility]\"" Cardio Vasc [address] RAFA DONAHUE ; 12/15/2021 ; NPP Cardio Vasc [address]  RAFA DONAHUE ; 12/16/2021 ; NPP Med GenInt [address]   RAFA DONAHUE ; 01/04/2022 ; NP Cardio [address]asc [address]  RAFA DONAHUE ; 01/04/2022 ; [facility] Cardio Vasc [address] RAFA DONAHUE A ; 12/15/2021 ; NPP Cardio Vasc [address]  RAFA DONAHUE A ; 12/16/2021 ; NPP Med GenInt [address]  RAFA DONAHUE A ; 01/04/2022 ; NPP Cardio Vasc [address]  RAFA DONAHUE A ; 01/04/2022 ; NPP Cardio Vasc [address]  DONAHUERAFA A ; 02/15/2022 ; NPP GASTRO [address]

## 2021-12-02 NOTE — DISCHARGE NOTE PROVIDER - CARE PROVIDERS DIRECT ADDRESSES
,[email or web address]

## 2021-12-02 NOTE — PATIENT PROFILE ADULT - FALL HARM RISK - HARM RISK INTERVENTIONS

## 2021-12-02 NOTE — DISCHARGE NOTE PROVIDER - NSDCHHCONTACT_GEN_ALL_CORE_FT
Hemigard Retention Suture: 2-0 Nylon As certified below, I, or a nurse practitioner or physician assistant working with me, had a face-to-face encounter that meets the physician face-to-face encounter requirements.

## 2021-12-02 NOTE — BH CONSULTATION LIAISON ASSESSMENT NOTE - DIFFERENTIAL
major depressive disorder vs. dysthymia  excoriation disorder  r/o complex trauma  r/o personality disorder

## 2021-12-03 ENCOUNTER — TRANSCRIPTION ENCOUNTER (OUTPATIENT)
Age: 71
End: 2021-12-03

## 2021-12-03 VITALS
DIASTOLIC BLOOD PRESSURE: 78 MMHG | SYSTOLIC BLOOD PRESSURE: 136 MMHG | RESPIRATION RATE: 17 BRPM | OXYGEN SATURATION: 99 % | TEMPERATURE: 98 F | HEART RATE: 83 BPM

## 2021-12-03 PROBLEM — F31.9 BIPOLAR DISORDER, UNSPECIFIED: Chronic | Status: ACTIVE | Noted: 2021-12-01

## 2021-12-03 PROBLEM — E03.9 HYPOTHYROIDISM, UNSPECIFIED: Chronic | Status: ACTIVE | Noted: 2021-12-01

## 2021-12-03 PROBLEM — F32.9 MAJOR DEPRESSIVE DISORDER, SINGLE EPISODE, UNSPECIFIED: Chronic | Status: ACTIVE | Noted: 2021-12-01

## 2021-12-03 PROBLEM — F42.4 EXCORIATION (SKIN-PICKING) DISORDER: Chronic | Status: ACTIVE | Noted: 2021-12-01

## 2021-12-03 PROBLEM — L03.119 CELLULITIS OF UNSPECIFIED PART OF LIMB: Chronic | Status: ACTIVE | Noted: 2021-12-01

## 2021-12-03 PROBLEM — R73.03 PREDIABETES: Chronic | Status: ACTIVE | Noted: 2021-12-01

## 2021-12-03 PROBLEM — I10 ESSENTIAL (PRIMARY) HYPERTENSION: Chronic | Status: ACTIVE | Noted: 2021-12-01

## 2021-12-03 PROBLEM — I82.409 ACUTE EMBOLISM AND THROMBOSIS OF UNSPECIFIED DEEP VEINS OF UNSPECIFIED LOWER EXTREMITY: Chronic | Status: ACTIVE | Noted: 2021-12-01

## 2021-12-03 PROBLEM — F41.9 ANXIETY DISORDER, UNSPECIFIED: Chronic | Status: ACTIVE | Noted: 2021-12-01

## 2021-12-03 PROBLEM — I35.0 NONRHEUMATIC AORTIC (VALVE) STENOSIS: Chronic | Status: ACTIVE | Noted: 2021-12-01

## 2021-12-03 LAB
ANION GAP SERPL CALC-SCNC: 7 MMOL/L — SIGNIFICANT CHANGE UP (ref 5–17)
BASOPHILS # BLD AUTO: 0.01 K/UL — SIGNIFICANT CHANGE UP (ref 0–0.2)
BASOPHILS NFR BLD AUTO: 0.2 % — SIGNIFICANT CHANGE UP (ref 0–2)
BUN SERPL-MCNC: 9 MG/DL — SIGNIFICANT CHANGE UP (ref 7–23)
CALCIUM SERPL-MCNC: 8.8 MG/DL — SIGNIFICANT CHANGE UP (ref 8.4–10.5)
CHLORIDE SERPL-SCNC: 110 MMOL/L — HIGH (ref 96–108)
CO2 SERPL-SCNC: 25 MMOL/L — SIGNIFICANT CHANGE UP (ref 22–31)
CREAT SERPL-MCNC: 0.6 MG/DL — SIGNIFICANT CHANGE UP (ref 0.5–1.3)
EOSINOPHIL # BLD AUTO: 0.22 K/UL — SIGNIFICANT CHANGE UP (ref 0–0.5)
EOSINOPHIL NFR BLD AUTO: 3.5 % — SIGNIFICANT CHANGE UP (ref 0–6)
GLUCOSE SERPL-MCNC: 148 MG/DL — HIGH (ref 70–99)
HCT VFR BLD CALC: 30.8 % — LOW (ref 34.5–45)
HGB BLD-MCNC: 8.4 G/DL — LOW (ref 11.5–15.5)
IMM GRANULOCYTES NFR BLD AUTO: 0.3 % — SIGNIFICANT CHANGE UP (ref 0–1.5)
LYMPHOCYTES # BLD AUTO: 0.78 K/UL — LOW (ref 1–3.3)
LYMPHOCYTES # BLD AUTO: 8.7 % — LOW (ref 13–44)
MAGNESIUM SERPL-MCNC: 2.4 MG/DL — SIGNIFICANT CHANGE UP (ref 1.6–2.6)
MCHC RBC-ENTMCNC: 25.1 PG — LOW (ref 27–34)
MCHC RBC-ENTMCNC: 27.3 GM/DL — LOW (ref 32–36)
MCV RBC AUTO: 91.9 FL — SIGNIFICANT CHANGE UP (ref 80–100)
MONOCYTES # BLD AUTO: 0.51 K/UL — SIGNIFICANT CHANGE UP (ref 0–0.9)
MONOCYTES NFR BLD AUTO: 6.1 % — SIGNIFICANT CHANGE UP (ref 2–14)
NEUTROPHILS # BLD AUTO: 4.47 K/UL — SIGNIFICANT CHANGE UP (ref 1.8–7.4)
NEUTROPHILS NFR BLD AUTO: 81.7 % — HIGH (ref 43–77)
NRBC # BLD: 0 /100 WBCS — SIGNIFICANT CHANGE UP (ref 0–0)
PHOSPHATE SERPL-MCNC: 3.1 MG/DL — SIGNIFICANT CHANGE UP (ref 2.5–4.5)
PLATELET # BLD AUTO: 511 K/UL — HIGH (ref 150–400)
POTASSIUM SERPL-MCNC: 3.9 MMOL/L — SIGNIFICANT CHANGE UP (ref 3.5–5.3)
POTASSIUM SERPL-SCNC: 3.9 MMOL/L — SIGNIFICANT CHANGE UP (ref 3.5–5.3)
RBC # BLD: 3.35 M/UL — LOW (ref 3.8–5.2)
RBC # FLD: 22 % — HIGH (ref 10.3–14.5)
SODIUM SERPL-SCNC: 142 MMOL/L — SIGNIFICANT CHANGE UP (ref 135–145)
VANCOMYCIN TROUGH SERPL-MCNC: 27.2 UG/ML — CRITICAL HIGH (ref 10–20)
WBC # BLD: 6.01 K/UL — SIGNIFICANT CHANGE UP (ref 3.8–10.5)
WBC # FLD AUTO: 6.01 K/UL — SIGNIFICANT CHANGE UP (ref 3.8–10.5)

## 2021-12-03 PROCEDURE — 99232 SBSQ HOSP IP/OBS MODERATE 35: CPT

## 2021-12-03 RX ORDER — AMPICILLIN SODIUM AND SULBACTAM SODIUM 250; 125 MG/ML; MG/ML
3 INJECTION, POWDER, FOR SUSPENSION INTRAMUSCULAR; INTRAVENOUS EVERY 6 HOURS
Refills: 0 | Status: DISCONTINUED | OUTPATIENT
Start: 2021-12-03 | End: 2021-12-03

## 2021-12-03 RX ORDER — AZTREONAM 2 G
1 VIAL (EA) INJECTION
Qty: 26 | Refills: 0
Start: 2021-12-03 | End: 2021-12-15

## 2021-12-03 RX ADMIN — POLYETHYLENE GLYCOL 3350 17 GRAM(S): 17 POWDER, FOR SOLUTION ORAL at 11:50

## 2021-12-03 RX ADMIN — PANTOPRAZOLE SODIUM 40 MILLIGRAM(S): 20 TABLET, DELAYED RELEASE ORAL at 06:26

## 2021-12-03 RX ADMIN — BUPROPION HYDROCHLORIDE 300 MILLIGRAM(S): 150 TABLET, EXTENDED RELEASE ORAL at 07:03

## 2021-12-03 RX ADMIN — Medication 500 MILLIGRAM(S): at 11:50

## 2021-12-03 RX ADMIN — Medication 325 MILLIGRAM(S): at 11:50

## 2021-12-03 RX ADMIN — Medication 166.67 MILLIGRAM(S): at 07:31

## 2021-12-03 RX ADMIN — Medication 112 MICROGRAM(S): at 06:26

## 2021-12-03 RX ADMIN — LIOTHYRONINE SODIUM 5 MICROGRAM(S): 25 TABLET ORAL at 06:26

## 2021-12-03 RX ADMIN — Medication 150 MILLIGRAM(S): at 11:50

## 2021-12-03 NOTE — PROGRESS NOTE ADULT - PROBLEM SELECTOR PLAN 1
Chronic problem since March/May 2021 managed with close follow up with Dr. Selena Pearce of [facility] vascular surgery. Pt most recently at [facility] on 11/16, discharged on Keflex 4x per day (finished 11/29), with recovery complicated by pt's removal of unna boot. On 12/1 pt saw new outpatient cardiologist Dr. Booker, who was concerned regarding LLE cellulitis and referred pt to [facility]. No leukocytosis, fever. On exam, L leg is tender, erythematous with multiple large open wounds and purulence. Dr. Orr reports cellulitis typically improves with Unna boot.   - S/p Vancomycin 1250mg x1 in ED, x2 on floor  - Patient refused vanc trough 12/3, attempt and f/u repeat vanc trough  - Initiate PO unasyn for 2 week course  - F/u wound care consult recs  -Outpatient follow up with Dr. Orr and Dr. Booker Chronic problem since March/May 2021 managed with close follow up with Dr. Selena Pearce of [facility] vascular surgery. Pt most recently at [facility] on 11/16, discharged on Keflex 4x per day (finished 11/29), with recovery complicated by pt's removal of unna boot. On 12/1 pt saw new outpatient cardiologist Dr. Booker, who was concerned regarding LLE cellulitis and referred pt to [facility]. No leukocytosis, fever. On exam, L leg is tender, erythematous with multiple large open wounds and purulence. Dr. Orr reports cellulitis typically improves with Unna boot.   - S/p Vancomycin 1250mg x1 in ED, x2 on floor  - Patient refused vanc trough 12/3, attempt and f/u repeat vanc trough  - Initiate PO bactrim for 2 week course  - F/u wound care consult recs  -Outpatient follow up with Dr. Orr and Dr. Booker

## 2021-12-03 NOTE — PHYSICAL THERAPY INITIAL EVALUATION ADULT - IMPAIRMENTS FOUND, PT EVAL
aerobic capacity/endurance/ergonomics and body mechanics/fine motor/gait, locomotion, and balance/gross motor/integumentary integrity/joint integrity and mobility/muscle strength/posture/ROM

## 2021-12-03 NOTE — ADVANCED PRACTICE NURSE CONSULT - RECOMMEDATIONS
Recommend following up with CV physician for unna boot application weekly. Spoke with house staff Clarke. Pt to be discharged today.

## 2021-12-03 NOTE — PROGRESS NOTE ADULT - ASSESSMENT
71F with a h/o chronic cellulitis (follows Dr. Pearce of [facility] vascular surgery, and recently hospitalized at [place] on 11/16 dc'd on keflex 4x/day, course completed 2 days ago), bipolar, excoriation disorder, HTN, preDM, mild aortic stenosis, hypothyroidism (on synthroid and cytomel), hx of right hip surgery in 7/2021 c/b infection Afib, and DVT (previously on Eliquis but d/c'd i/s/o anemia), anemia (s/p blood transfusion at [facility] 10/27/21 where she gets most of her care and left AMA), referred by new cardiologist Dr. Roy for worsening and unhealing LLE wound and infection admitted for management of cellulitis, complicated by fragmented outpatient care.

## 2021-12-03 NOTE — PROGRESS NOTE ADULT - PROBLEM SELECTOR PLAN 8
Per outpatient note by Dr. Wyatt ([facility]), Dr. Clarke of [facility] most recently performed echo in 8/21 demonstrating mild aortic stenosis and moderate LA dilation. Pt reports she no longer follows with Dr. Clarke of [facility] and now follows with Dr. Booker of [facility].

## 2021-12-03 NOTE — PROGRESS NOTE ADULT - PROBLEM SELECTOR PLAN 11
F: None  E: Replete PRN  N: DASH/TLC diet  GI PPx: None  DVT PPx: SCDs, holding AC i/s/o anemia  Dispo: [facility]
F: None  E: Replete PRN  N: DASH/TLC diet  GI PPx: None  DVT PPx: SCDs, holding AC i/s/o anemia  Dispo: [facility]

## 2021-12-03 NOTE — PROGRESS NOTE ADULT - PROBLEM SELECTOR PLAN 6
Patient with history of provoked DVT after hip surgery in the past. Was on Eliquis after that but Eliquis was discontinued during 10/27 ED visit at [place] after she became anemic for risk of possible bleeding.   - DVT ppx with SCDs at this time  - Optimal anticoagulation prophylaxis and control to be determined outpatient, following anemia workup
Patient with history of provoked DVT after hip surgery in the past. Was on Eliquis after that but Eliquis was discontinued after she became anemic for risk of possible bleeding.   - DVT ppx with SCDs at this time

## 2021-12-03 NOTE — PROGRESS NOTE ADULT - ATTENDING COMMENTS
Pt refused to get evaluated for GI bleed and wants to f/u as outpt and understands risks and benefits and has decisional capacity  cont antibiotics for cellulitis  HHa at bedside   advised to f/u with cardiology,vascular and GI
agree with assessment and plan as documented by resident.   --will continue IV vancomycin and likely transition to po bactrim upon discharge; if exam worsening tomorrow (stable today) can consider gram negative coverage - however patient recently completed course of kefflex and currently with purulent drainage more concerning for gram positive bugs ; wound cx deferred as superficial wounds likely contaminated with multiple bugs   --1u prbc transfusion for anemia of unclear source - on po iron at home. no overt signs of bleeding, however in feb 21 had hemoglobin ~11 - patient with colo/egd 2 years ago - recheck cbc after prbc, if continues to drop will need GI consult for further eval - versus outpatient f/u if stable   --f/u pt eval for dispo planning  --will try to reach out to outpatient doctors for collateral but patient endorses poor f/u with pcp     potential discharge tomorrow on po abx if h/h stable and pt recommending home

## 2021-12-03 NOTE — PROGRESS NOTE ADULT - PROBLEM SELECTOR PLAN 9
Patient with history of paroxysmal afib after hip arthroplasty on  7/16/21. Post-op course complicated by infection and afib. Previously took Eliquis but discontinued 10/27 at [place] ED in the setting of anemia and with unknown source.   - EKG NSR, will get repeat EKG if patient feels palpitations or unwell  - Optimal anticoagulation prophylaxis and control to be determined outpatient, following anemia workup

## 2021-12-03 NOTE — PHYSICAL THERAPY INITIAL EVALUATION ADULT - PERTINENT HX OF CURRENT PROBLEM, REHAB EVAL
71F hx Bipolar, excoriation disorder, HTN, preDM, aortic stenosis, hypothyroidism, right hip surgery c/b infection, Afib, and DVT, anemia, LLE cellulitis (recently hospitalized and d/c'd on keflex and another abx-completed 2 days ago), presents today from cardiologist, Dr. Booker's office, where she follows for wound care and unna boot for worsening and unhealing LLE wound and infection admitted for management of cellulitis.

## 2021-12-03 NOTE — PROGRESS NOTE ADULT - PROBLEM SELECTOR PLAN 2
Hgb on admission 7.2 , currently no signs of active bleeding. Patient says that her baseline currently is in the 8 range. In 6/2021 hgb was 11, with a drop to 8 in 10/18 on initial visit with Marianne Wyatt, who notified and deferred work-up to Tay Pink NP.  However, pt no longer sees NP, and is searching for a new PCP. She reports no anemia workup done thus far. On 10/27, she went to [facility] ED, received a RBC transfusion, started on daily iron supplements, and eliquis was discontinued in setting of anemia; however, though she signed out AMA. She currently denies any abdominal pain, hematochezia, melena, hemoptysis, hematuria, vaginal bleeding. Most recent colonoscopy and EGD was done 2 years ago.   - Iron panel: low Iron  - Hg dropped from 7.2 to 6.8, 1 unit pRBC transfused with appropriate response to 7.7   - maintain active T&S  - transfuse if Hgb <7   - c/w PO iron supplement  - Switch to PO protonix 40 qd  - If AM labs obtained and Hgb stable, defer to outpatient GI scope

## 2021-12-03 NOTE — CHART NOTE - NSCHARTNOTEFT_GEN_A_CORE
AMA    Ms. Garcia is a 72 y/o F with a h/o chronic cellulitis (follows Dr. Pearce of [facility] vascular surgery, and recently hospitalized at [place] on 11/16 dc'd on keflex 4x/day, course completed 2 days ago), bipolar, excoriation disorder, HTN, preDM, mild aortic stenosis, hypothyroidism (on synthroid and cytomel), hx of right hip surgery in 7/2021 c/b infection Afib, and DVT (previously on Eliquis but d/c'd i/s/o anemia), anemia (s/p blood transfusion at [facility] 10/27/21 where she gets most of her care and left AMA), referred by new cardiologist Dr. Roy for worsening and unhealing LLE wound and infection admitted for management of cellulitis.     The patient wishes to leave against medical advice.  I have discussed the risks, benefits and alternatives (including the possibility of worsening of disease, pain, permanent disability, and/or death) with the patient and his/her family (if available).  The patient voices understanding of these risks, benefits, and alternatives and still wishes to sign out against medical advice.  The patient is awake, alert, oriented  x 3 and has demonstrated capacity to refuse/direct care.  I have advised the patient that they can and should return immediately should they develop any worse/different/additional symptoms, or if they change their mind and want to continue their care.

## 2021-12-03 NOTE — DISCHARGE NOTE NURSING/CASE MANAGEMENT/SOCIAL WORK - NSDCFUADDAPPT_GEN_ALL_CORE_FT
What Type Of Note Output Would You Prefer (Optional)?: Standard Output How Severe Is Your Rash?: moderate Is This A New Presentation, Or A Follow-Up?: Rash Please bring your Insurance card, Photo ID, Covid-19 vaccination card (if applicable) and Discharge paperwork to the following appointment:    (1) Please follow up with your Primary Care Provider, Dr. Liset Woo on behalf of Dr. Jesusita Padilla at [address] [place] [address], [address] [place] [address] on 12/16/2021 at 2:00pm.    Appointment was scheduled by Ms. REX Alberts, Referral Coordinator.    (2) Please call your gastroenterology specialist, Dr. Liv Christopher at [phone] to check for cancellations to make an earlier appointment.

## 2021-12-03 NOTE — DISCHARGE NOTE NURSING/CASE MANAGEMENT/SOCIAL WORK - PATIENT PORTAL LINK FT
You can access the FollowMyHealth Patient Portal offered by [facility] by registering at the following website: [email or web address]. By joining [facility]’s FollowMyHealth portal, you will also be able to view your health information using other applications (apps) compatible with our system.

## 2021-12-03 NOTE — PROGRESS NOTE ADULT - PROBLEM SELECTOR PLAN 3
Patient with uncontrolled skin picking disorder. Multiple excoriations over face, arms, abdomen, legs, and hips. Patient does not see a therapist and only manages with medications prescribed by Dr. Koffi Mora at [place]. Pt endorses desire to continue care with them out patient.  - C/w home psych meds  - Psych consult recommends discussing polypharmacy issues with her outpatient provider, and suggest continuing Wellbutrin XL 300mg PO daily and venlafaxine 300mg PO daily for depressive symptoms, continuing Klonopin 3mg PO qHS and melatonin 5mg PO qHS for insomnia, and potentially Seroquel 75mg PO qHS PRN for insomnia if needed

## 2021-12-03 NOTE — PROGRESS NOTE ADULT - PROBLEM SELECTOR PLAN 7
Patient diagnosed with pre-DM. Unknown last A1c. On Metformin 500mg bid at home.   - mISS and carb consistent diet  - Hold metformin in setting of hospitalization, insulin sliding scale PRN  - AM hbA1C 4.2 wnl, though may be falsely low in setting of anemia

## 2021-12-03 NOTE — PROGRESS NOTE ADULT - SUBJECTIVE AND OBJECTIVE BOX
** INCOMPLETE **    OVERNIGHT EVENTS:     SUBJECTIVE:    VITAL SIGNS:  Vital Signs Last 24 Hrs  T(C): 36.7 (03 Dec 2021 05:21), Max: 37.2 (02 Dec 2021 15:30)  T(F): 98.1 (03 Dec 2021 05:21), Max: 98.9 (02 Dec 2021 15:30)  HR: 75 (03 Dec 2021 05:21) (75 - 87)  BP: 156/81 (03 Dec 2021 05:21) (104/57 - 156/81)  BP(mean): --  RR: 16 (03 Dec 2021 05:21) (16 - 17)  SpO2: 98% (03 Dec 2021 05:21) (97% - 99%)    PHYSICAL EXAM:  General: NAD; speaking in full sentences  HEENT: NC/AT; PERRL; EOMI; MMM  Neck: supple; no JVD  Cardiac: RRR; +S1/S2  Pulm: CTA B/L; no W/R/R  GI: soft, NT/ND, +BS  Extremities: WWP; no edema, clubbing or cyanosis  Vasc: 2+ radial, DP pulses B/L  Neuro: AAOx3; no focal deficits    MEDICATIONS:  MEDICATIONS  (STANDING):  ascorbic acid 500 milliGRAM(s) Oral daily  buPROPion XL (24-Hour) . 300 milliGRAM(s) Oral every 24 hours  clonazePAM  Tablet 3 milliGRAM(s) Oral at bedtime  dextrose 40% Gel 15 Gram(s) Oral once  dextrose 5%. 1000 milliLiter(s) (50 mL/Hr) IV Continuous <Continuous>  dextrose 50% Injectable 25 Gram(s) IV Push once  ferrous    sulfate 325 milliGRAM(s) Oral daily  glucagon  Injectable 1 milliGRAM(s) IntraMuscular once  insulin lispro (ADMELOG) corrective regimen sliding scale   SubCutaneous at bedtime  levothyroxine 112 MICROGram(s) Oral daily  liothyronine 5 MICROGram(s) Oral daily  lithium 150 milliGRAM(s) Oral at bedtime  melatonin 10 milliGRAM(s) Oral at bedtime  pantoprazole    Tablet 40 milliGRAM(s) Oral before breakfast  polyethylene glycol 3350 17 Gram(s) Oral daily  potassium chloride   Powder 20 milliEquivalent(s) Oral once  QUEtiapine 75 milliGRAM(s) Oral at bedtime  senna 2 Tablet(s) Oral at bedtime  topiramate 75 milliGRAM(s) Oral daily  trimethoprim  160 mG/sulfamethoxazole 800 mG 1 Tablet(s) Oral two times a day  venlafaxine XR. 150 milliGRAM(s) Oral daily    MEDICATIONS  (PRN):  acetaminophen     Tablet .. 650 milliGRAM(s) Oral every 6 hours PRN Temp greater or equal to 38C (100.4F), Mild Pain (1 - 3)      ALLERGIES:  Allergies    No Known Allergies    Intolerances        LABS:                        7.8    6.26  )-----------( 524      ( 02 Dec 2021 20:06 )             29.0     12-02    142  |  108  |  11  ----------------------------<  113<H>  3.5   |  25  |  0.69    Ca    8.3<L>      02 Dec 2021 08:09    TPro  6.1  /  Alb  3.5  /  TBili  0.3  /  DBili  x   /  AST  15  /  ALT  12  /  AlkPhos  95  12-02    PT/INR - ( 01 Dec 2021 20:35 )   PT: 15.6 sec;   INR: 1.32          PTT - ( 01 Dec 2021 20:35 )  PTT:33.1 sec    RADIOLOGY & ADDITIONAL TESTS: Reviewed. OVERNIGHT EVENTS: No acute events.     SUBJECTIVE: Patient examined at bedside, resting comfortably in bed. She reports not knowing whether her wounds have improved or changed. She reports  she still has not yet had a bowel movement. She denies any fevers, fatigue, dizziness, headaches, chest pain, SOB, abdominal pain, dysuria, nausea/vomiting, weakness, change in sensation.    VITAL SIGNS:  Vital Signs Last 24 Hrs  T(C): 36.7 (03 Dec 2021 05:21), Max: 37.2 (02 Dec 2021 15:30)  T(F): 98.1 (03 Dec 2021 05:21), Max: 98.9 (02 Dec 2021 15:30)  HR: 75 (03 Dec 2021 05:21) (75 - 87)  BP: 156/81 (03 Dec 2021 05:21) (104/57 - 156/81)  BP(mean): --  RR: 16 (03 Dec 2021 05:21) (16 - 17)  SpO2: 98% (03 Dec 2021 05:21) (97% - 99%)    PHYSICAL EXAM:  General: NAD; speaking in full sentences  HEENT: NC/AT; PERRL; EOMI; MMM  Neck: supple; no JVD  Cardiac: RRR; +S1/S2. Crescendo-decrescendo murmur loudest at the RUSB.  Pulm: CTA B/L; no W/R/R  GI: soft, NT/ND, +BS  Extremities: WWP; no edema, clubbing or cyanosis. LLE demonstrated diffuse, circumferential erythema of the lower leg and warmth to touch. Large, superficial exposed moist, draining wounds with roger purulence. Unchanged from yesterday.  Vasc 2+ radial, DP, PT pulses B/L.  Derm: 2-3cm crusting, lesions of the right eye brow, lateral cheek, and scattered across abdomen. Diffusely scattered macules and patches of erythema.   Neuro: AAOx3; no focal deficits    MEDICATIONS:  MEDICATIONS  (STANDING):  ascorbic acid 500 milliGRAM(s) Oral daily  buPROPion XL (24-Hour) . 300 milliGRAM(s) Oral every 24 hours  clonazePAM  Tablet 3 milliGRAM(s) Oral at bedtime  dextrose 40% Gel 15 Gram(s) Oral once  dextrose 5%. 1000 milliLiter(s) (50 mL/Hr) IV Continuous <Continuous>  dextrose 50% Injectable 25 Gram(s) IV Push once  ferrous    sulfate 325 milliGRAM(s) Oral daily  glucagon  Injectable 1 milliGRAM(s) IntraMuscular once  insulin lispro (ADMELOG) corrective regimen sliding scale   SubCutaneous at bedtime  levothyroxine 112 MICROGram(s) Oral daily  liothyronine 5 MICROGram(s) Oral daily  lithium 150 milliGRAM(s) Oral at bedtime  melatonin 10 milliGRAM(s) Oral at bedtime  pantoprazole    Tablet 40 milliGRAM(s) Oral before breakfast  polyethylene glycol 3350 17 Gram(s) Oral daily  potassium chloride   Powder 20 milliEquivalent(s) Oral once  QUEtiapine 75 milliGRAM(s) Oral at bedtime  senna 2 Tablet(s) Oral at bedtime  topiramate 75 milliGRAM(s) Oral daily  trimethoprim  160 mG/sulfamethoxazole 800 mG 1 Tablet(s) Oral two times a day  venlafaxine XR. 150 milliGRAM(s) Oral daily    MEDICATIONS  (PRN):  acetaminophen     Tablet .. 650 milliGRAM(s) Oral every 6 hours PRN Temp greater or equal to 38C (100.4F), Mild Pain (1 - 3)      ALLERGIES:  Allergies    No Known Allergies    Intolerances        LABS:                        7.8    6.26  )-----------( 524      ( 02 Dec 2021 20:06 )             29.0     12-02    142  |  108  |  11  ----------------------------<  113<H>  3.5   |  25  |  0.69    Ca    8.3<L>      02 Dec 2021 08:09    TPro  6.1  /  Alb  3.5  /  TBili  0.3  /  DBili  x   /  AST  15  /  ALT  12  /  AlkPhos  95  12-02    PT/INR - ( 01 Dec 2021 20:35 )   PT: 15.6 sec;   INR: 1.32          PTT - ( 01 Dec 2021 20:35 )  PTT:33.1 sec    RADIOLOGY & ADDITIONAL TESTS: Reviewed.

## 2021-12-03 NOTE — PROGRESS NOTE ADULT - PROBLEM SELECTOR PLAN 4
Patient with extensive psychiatric history, on multiple medications. Does not see a behavioral health specialist because she says she can't afford it but has a psychopharmacologist Dr. Koffi Mari who prescribes her medications.  - C/w home wellbutrin 300mg qd in AM, Topiramate 75mg qd, Seroquel 75mg qhs, Lithium 150mg qhs, Klonopin 3mg qhs, effexor  - AM lithium low at 0.1  - fu AM topiramate level  - Holding methylphenidate for ADHD and Effexor for depression  - see above Psych recs    #MDD  -See above    #Anxiety Disorder  - See above    #ADHD  - See above

## 2021-12-03 NOTE — DISCHARGE NOTE NURSING/CASE MANAGEMENT/SOCIAL WORK - NSDCPEFALRISK_GEN_ALL_CORE
For information on Fall & Injury Prevention, visit: [email or web address] OR  [email or web address] OR  https://www.cdc.gov/steadi/patient.html

## 2021-12-03 NOTE — PROGRESS NOTE ADULT - PROBLEM SELECTOR PLAN 5
Patient with hypothyroidism on synthroid and cytomel. Patient says she feels as though her synthroid dose needs to be adjusted because she has been gaining weight despite not having an appetite. Has not seen PCP in few months. She also takes cytomel for her hypothyroidism which she says her doctor said has side effects that help with depression.   - C/w home synthroid 112mcg, cytomel 5mcg  - TSH wnl

## 2021-12-03 NOTE — PHYSICAL THERAPY INITIAL EVALUATION ADULT - GENERAL OBSERVATIONS, REHAB EVAL
Pt received supine with HOB elevated in NAD all lines in tact, call bell in reach, cleared for PT IE by RN, pt agreeable.

## 2021-12-03 NOTE — PROGRESS NOTE ADULT - PROBLEM SELECTOR PLAN 10
Patient without bowel movement in few days.   - c/w on miralax 17g qd and senna 2tab qhs
Patient without bowel movement in few days.   - Started on miralax 17g qd and senna 2tab qhs

## 2021-12-06 LAB — TOPIRAMATE SERPL-MCNC: <1 MCG/ML — SIGNIFICANT CHANGE UP

## 2021-12-07 LAB
CULTURE RESULTS: SIGNIFICANT CHANGE UP
CULTURE RESULTS: SIGNIFICANT CHANGE UP
SPECIMEN SOURCE: SIGNIFICANT CHANGE UP
SPECIMEN SOURCE: SIGNIFICANT CHANGE UP

## 2021-12-08 DIAGNOSIS — I35.0 NONRHEUMATIC AORTIC (VALVE) STENOSIS: ICD-10-CM

## 2021-12-08 DIAGNOSIS — I48.0 PAROXYSMAL ATRIAL FIBRILLATION: ICD-10-CM

## 2021-12-08 DIAGNOSIS — D64.9 ANEMIA, UNSPECIFIED: ICD-10-CM

## 2021-12-08 DIAGNOSIS — R73.03 PREDIABETES: ICD-10-CM

## 2021-12-08 DIAGNOSIS — E03.9 HYPOTHYROIDISM, UNSPECIFIED: ICD-10-CM

## 2021-12-08 DIAGNOSIS — K59.00 CONSTIPATION, UNSPECIFIED: ICD-10-CM

## 2021-12-08 DIAGNOSIS — F39 UNSPECIFIED MOOD [AFFECTIVE] DISORDER: ICD-10-CM

## 2021-12-08 DIAGNOSIS — I10 ESSENTIAL (PRIMARY) HYPERTENSION: ICD-10-CM

## 2021-12-08 DIAGNOSIS — L98.1 FACTITIAL DERMATITIS: ICD-10-CM

## 2021-12-08 DIAGNOSIS — F90.9 ATTENTION-DEFICIT HYPERACTIVITY DISORDER, UNSPECIFIED TYPE: ICD-10-CM

## 2021-12-08 DIAGNOSIS — F42.4 EXCORIATION (SKIN-PICKING) DISORDER: ICD-10-CM

## 2021-12-08 DIAGNOSIS — I20.0 UNSTABLE ANGINA: ICD-10-CM

## 2021-12-08 DIAGNOSIS — F41.9 ANXIETY DISORDER, UNSPECIFIED: ICD-10-CM

## 2021-12-08 DIAGNOSIS — L03.116 CELLULITIS OF LEFT LOWER LIMB: ICD-10-CM

## 2021-12-08 DIAGNOSIS — Z86.718 PERSONAL HISTORY OF OTHER VENOUS THROMBOSIS AND EMBOLISM: ICD-10-CM

## 2021-12-08 DIAGNOSIS — F33.9 MAJOR DEPRESSIVE DISORDER, RECURRENT, UNSPECIFIED: ICD-10-CM

## 2021-12-08 DIAGNOSIS — R23.4 CHANGES IN SKIN TEXTURE: ICD-10-CM

## 2021-12-16 ENCOUNTER — APPOINTMENT (OUTPATIENT)
Dept: INTERNAL MEDICINE | Facility: CLINIC | Age: 71
End: 2021-12-16
Payer: MEDICARE

## 2021-12-16 VITALS
OXYGEN SATURATION: 98 % | SYSTOLIC BLOOD PRESSURE: 122 MMHG | RESPIRATION RATE: 18 BRPM | HEART RATE: 91 BPM | DIASTOLIC BLOOD PRESSURE: 75 MMHG | TEMPERATURE: 98 F

## 2021-12-16 DIAGNOSIS — F39 UNSPECIFIED MOOD [AFFECTIVE] DISORDER: ICD-10-CM

## 2021-12-16 DIAGNOSIS — I48.0 PAROXYSMAL ATRIAL FIBRILLATION: ICD-10-CM

## 2021-12-16 PROCEDURE — 36415 COLL VENOUS BLD VENIPUNCTURE: CPT

## 2021-12-16 PROCEDURE — 99495 TRANSJ CARE MGMT MOD F2F 14D: CPT | Mod: GC,25

## 2021-12-16 RX ORDER — OMEPRAZOLE 40 MG/1
40 CAPSULE, DELAYED RELEASE ORAL
Refills: 0 | Status: DISCONTINUED | COMMUNITY
End: 2021-12-16

## 2021-12-16 RX ORDER — APIXABAN 5 MG/1
5 TABLET, FILM COATED ORAL
Refills: 0 | Status: DISCONTINUED | COMMUNITY
End: 2021-12-16

## 2021-12-17 PROBLEM — F39 MOOD DISORDER: Status: ACTIVE | Noted: 2021-12-17

## 2021-12-17 PROBLEM — I48.0 PAROXYSMAL ATRIAL FIBRILLATION: Status: ACTIVE | Noted: 2021-12-01

## 2021-12-17 LAB
BASOPHILS # BLD AUTO: 0.02 K/UL
BASOPHILS NFR BLD AUTO: 0.2 %
EOSINOPHIL # BLD AUTO: 0.16 K/UL
EOSINOPHIL NFR BLD AUTO: 1.9 %
HCT VFR BLD CALC: 34 %
HGB BLD-MCNC: 8.5 G/DL
IMM GRANULOCYTES NFR BLD AUTO: 0.4 %
LYMPHOCYTES # BLD AUTO: 1.26 K/UL
LYMPHOCYTES NFR BLD AUTO: 15.2 %
MAN DIFF?: NORMAL
MCHC RBC-ENTMCNC: 24.4 PG
MCHC RBC-ENTMCNC: 25 GM/DL
MCV RBC AUTO: 97.4 FL
MONOCYTES # BLD AUTO: 0.67 K/UL
MONOCYTES NFR BLD AUTO: 8.1 %
NEUTROPHILS # BLD AUTO: 6.14 K/UL
NEUTROPHILS NFR BLD AUTO: 74.2 %
PLATELET # BLD AUTO: 630 K/UL
RBC # BLD: 3.49 M/UL
RBC # FLD: 18.5 %
WBC # FLD AUTO: 8.28 K/UL

## 2021-12-17 NOTE — ASSESSMENT
[FreeTextEntry1] : 71F w/ PMHx BPD, depression, anxiety, ADHD, insomnia, compulsive skin picking, hypothyroidism, pre-DM, osteoarthritis, and right hip replacement c/b post-op Afib, started on Eliquis, c/b bleeding and anemia, presenting for post-hospital discharge.\par \par #Anemia\par - 2/2 severe bleeding from anxious skin picking while on eliquis\par - Left shin is wrapped in ACE bandage to prevent further injury\par - On Fe Sulfate 325 BID\par - F/u CBC\par \par #pre-DM\par - On metformin 500 BID\par - Last A1c 6.4\par - F/u lipid profile\par \par #Hypothyroidism\par - On synthroid 112 and liothyronine 12.5\par \par #Bipolar/Depression/Anxiety/ADHD/Insomnia\par - Follows with psychiatrist at [place] Dr. Siobhan Moscoso\par - Daily home meds: methylphenidate 54, venlafaxine 300, wellbutryn 300, klonapin 3 qHS, lithium 150, topiramate 75, seroquel 75 qHS, and ambien PRN.\par \par #HCM\par - already received flu shot\par - COVID vaccinated x2\par - Last colonoscopy 2 years ago\par - Last mammogram 2 years ago\par - Needs pneumovax and shingrix vaccines

## 2021-12-17 NOTE — HISTORY OF PRESENT ILLNESS
[Post-hospitalization from ___ Hospital] : Post-hospitalization from [unfilled] Hospital [Admitted on: ___] : The patient was admitted on [unfilled] [Discharged on ___] : discharged on [unfilled] [Discharge Summary] : discharge summary [Pertinent Labs] : pertinent labs [Radiology Findings] : radiology findings [Discharge Med List] : discharge medication list [Med Reconciliation] : medication reconciliation has been completed [Patient Contacted By: ____] : and contacted by [unfilled] [FreeTextEntry2] : 71F w/ PMHx BPD, depression, anxiety, ADHD, insomnia, compulsive skin picking, hypothyroidism, pre-DM, osteoarthritis, and right hip replacement c/b post-op Afib, started on Eliquis, c/b bleeding and anemia, for which she was sent to [facility] ED from Dr. Wyatt's office for Hgb 6.9, was transfused 1u pRBC and Hgb improved to 7.6, and she was discharged on Fe sulfate 325 BID. She now presents for post-discharge follow-up appointment. She also needs to establish care with a primary doctor. \par \par Patient was evaluated for dyspnea on exertion in October and December of this year by cardiology Dr. Booker and pulm Dr. Wyatt. Cards work-up showed no heart disease. 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 No history of smoking, no history of lung disease.\par \par Of note, patient sees psychiatrist affiliated with [place] Dr. Siobhan Moscoso. Her daily psychiatric medications are methylphenidate 54, venlafaxine 300, wellbutryn 300, klonapin 3 qHS, lithium 150, topiramate 75, seroquel 75 qHS, and ambien PRN. Patient complains of insomnia for which she reports her psychiatrist was planning on decreasing her daytime psychoactive medications. She suffered from ongoing skin picking of her right upper arm and left calf since 2008 due to anxiety. She recently had her left calf wrapped in a protective boot by orthopedist due to repeated injury and infection. Denies headaches, blurry vision, sore throat, cough, CP, SOB, N/V, diarrhea.

## 2021-12-17 NOTE — END OF VISIT
[FreeTextEntry3] : I saw and evaluated the patient.  The findings and assessment were discussed with the resident and I agree with resident’s plan as documented in the above, in the resident’s note.\par \par Pertinent exam findings: Well-appearing, NAD. Skin exoriations\par \par Skin picking disorder: Severe.  Follows w/ neuropsych at [place] on an extensive psych med regimen.  C/w current meds, will defer mgmt.\par \par Blood loss anemia: Repeat CBC to assess stability and appropriateness of A/C.\par \par Afib: Follows w/ cardiology. \par \par Hypothryoidism: C/w current thyroid hormone regimen (T4/T3 regimen).\par HCM is up to date.

## 2021-12-17 NOTE — PHYSICAL EXAM
[Normal] : normal rate, regular rhythm, normal S1 and S2 and no murmur heard [de-identified] : Scabbed over areas of injury in the right upper extremity as a result of excessive skin picking

## 2021-12-19 ENCOUNTER — NON-APPOINTMENT (OUTPATIENT)
Age: 71
End: 2021-12-19

## 2021-12-20 ENCOUNTER — NON-APPOINTMENT (OUTPATIENT)
Age: 71
End: 2021-12-20

## 2021-12-21 ENCOUNTER — APPOINTMENT (OUTPATIENT)
Dept: INTERNAL MEDICINE | Facility: CLINIC | Age: 71
End: 2021-12-21
Payer: MEDICARE

## 2021-12-21 VITALS
SYSTOLIC BLOOD PRESSURE: 123 MMHG | HEART RATE: 96 BPM | DIASTOLIC BLOOD PRESSURE: 71 MMHG | OXYGEN SATURATION: 100 % | HEIGHT: 63 IN | TEMPERATURE: 97.9 F

## 2021-12-21 VITALS
BODY MASS INDEX: 44.82 KG/M2 | HEART RATE: 87 BPM | WEIGHT: 253 LBS | OXYGEN SATURATION: 96 % | SYSTOLIC BLOOD PRESSURE: 135 MMHG | RESPIRATION RATE: 18 BRPM | TEMPERATURE: 98 F | DIASTOLIC BLOOD PRESSURE: 75 MMHG

## 2021-12-21 DIAGNOSIS — F42.4 EXCORIATION (SKIN-PICKING) DISORDER: ICD-10-CM

## 2021-12-21 DIAGNOSIS — L03.90 CELLULITIS, UNSPECIFIED: ICD-10-CM

## 2021-12-21 PROCEDURE — 99214 OFFICE O/P EST MOD 30 MIN: CPT | Mod: GC

## 2021-12-21 RX ORDER — CEPHALEXIN 500 MG/1
500 TABLET ORAL 3 TIMES DAILY
Qty: 21 | Refills: 0 | Status: ACTIVE | COMMUNITY
Start: 2021-12-21 | End: 1900-01-01

## 2021-12-21 RX ORDER — SULFAMETHOXAZOLE AND TRIMETHOPRIM 800; 160 MG/1; MG/1
800-160 TABLET ORAL TWICE DAILY
Qty: 14 | Refills: 0 | Status: ACTIVE | COMMUNITY
Start: 2021-12-21 | End: 1900-01-01

## 2021-12-22 ENCOUNTER — NON-APPOINTMENT (OUTPATIENT)
Age: 71
End: 2021-12-22

## 2021-12-22 PROBLEM — L03.90 CELLULITIS: Status: ACTIVE | Noted: 2021-12-01

## 2021-12-22 PROBLEM — F42.4 SKIN PICKING HABIT: Status: ACTIVE | Noted: 2021-12-17

## 2021-12-22 PROCEDURE — 83550 IRON BINDING TEST: CPT

## 2021-12-22 PROCEDURE — 84100 ASSAY OF PHOSPHORUS: CPT

## 2021-12-22 PROCEDURE — 80202 ASSAY OF VANCOMYCIN: CPT

## 2021-12-22 PROCEDURE — 80048 BASIC METABOLIC PNL TOTAL CA: CPT

## 2021-12-22 PROCEDURE — 86901 BLOOD TYPING SEROLOGIC RH(D): CPT

## 2021-12-22 PROCEDURE — 83036 HEMOGLOBIN GLYCOSYLATED A1C: CPT

## 2021-12-22 PROCEDURE — 84466 ASSAY OF TRANSFERRIN: CPT

## 2021-12-22 PROCEDURE — 97161 PT EVAL LOW COMPLEX 20 MIN: CPT

## 2021-12-22 PROCEDURE — 86922 COMPATIBILITY TEST ANTIGLOB: CPT

## 2021-12-22 PROCEDURE — 85730 THROMBOPLASTIN TIME PARTIAL: CPT

## 2021-12-22 PROCEDURE — 86870 RBC ANTIBODY IDENTIFICATION: CPT

## 2021-12-22 PROCEDURE — 83735 ASSAY OF MAGNESIUM: CPT

## 2021-12-22 PROCEDURE — 80201 ASSAY OF TOPIRAMATE: CPT

## 2021-12-22 PROCEDURE — 86902 BLOOD TYPE ANTIGEN DONOR EA: CPT

## 2021-12-22 PROCEDURE — 85652 RBC SED RATE AUTOMATED: CPT

## 2021-12-22 PROCEDURE — 85025 COMPLETE CBC W/AUTO DIFF WBC: CPT

## 2021-12-22 PROCEDURE — P9016: CPT

## 2021-12-22 PROCEDURE — 84443 ASSAY THYROID STIM HORMONE: CPT

## 2021-12-22 PROCEDURE — 93005 ELECTROCARDIOGRAM TRACING: CPT

## 2021-12-22 PROCEDURE — 83540 ASSAY OF IRON: CPT

## 2021-12-22 PROCEDURE — 86880 COOMBS TEST DIRECT: CPT

## 2021-12-22 PROCEDURE — 87635 SARS-COV-2 COVID-19 AMP PRB: CPT

## 2021-12-22 PROCEDURE — 71045 X-RAY EXAM CHEST 1 VIEW: CPT

## 2021-12-22 PROCEDURE — 87040 BLOOD CULTURE FOR BACTERIA: CPT

## 2021-12-22 PROCEDURE — 82728 ASSAY OF FERRITIN: CPT

## 2021-12-22 PROCEDURE — 86803 HEPATITIS C AB TEST: CPT

## 2021-12-22 PROCEDURE — 86900 BLOOD TYPING SEROLOGIC ABO: CPT

## 2021-12-22 PROCEDURE — 99285 EMERGENCY DEPT VISIT HI MDM: CPT

## 2021-12-22 PROCEDURE — 80061 LIPID PANEL: CPT

## 2021-12-22 PROCEDURE — 36415 COLL VENOUS BLD VENIPUNCTURE: CPT

## 2021-12-22 PROCEDURE — 36430 TRANSFUSION BLD/BLD COMPNT: CPT

## 2021-12-22 PROCEDURE — 82962 GLUCOSE BLOOD TEST: CPT

## 2021-12-22 PROCEDURE — 85610 PROTHROMBIN TIME: CPT

## 2021-12-22 PROCEDURE — 85027 COMPLETE CBC AUTOMATED: CPT

## 2021-12-22 PROCEDURE — 80178 ASSAY OF LITHIUM: CPT

## 2021-12-22 PROCEDURE — 80053 COMPREHEN METABOLIC PANEL: CPT

## 2021-12-22 PROCEDURE — 86850 RBC ANTIBODY SCREEN: CPT

## 2021-12-22 PROCEDURE — 86140 C-REACTIVE PROTEIN: CPT

## 2021-12-22 NOTE — PHYSICAL EXAM
[Normal] : soft, non-tender, non-distended, no masses palpated, no HSM and normal bowel sounds [de-identified] : Left ankle with large open wound from patient's picking, some dried blood, mostly granulation tissue, some oozing of sero-sanguinous fluid, some erythema around the border of the wound, and warm to the touch

## 2021-12-22 NOTE — ASSESSMENT
[FreeTextEntry1] : \par 71F PMHx BPD, depression/anxiety, ADHD, insomnia, compulsive skin picking of left ankle and right shoulder, hypothyroidism, pre-DM, OA, right hip replacement c/b bleeding leading to anemia, presents for 1-week follow-up appointment with complaint of pain in her left ankle that required her to remove her protective boot.\par \par #Left lower leg wound\par - 2/2 skin picking\par - Open wound with mostly granulation tissue and some sero-sanguinous fluid discharge\par - Follows with Dr. Selena Pearce vascular surgeon at [place], next appointment tomorrow 12/22 for re-wrapping of ankle\par - Abx to cover for potential skin/soft tissue infection\par - Prescribe 7-day course of both Bacrim DS BID and Keflex 500 TID\par - Vascular surgery consult

## 2021-12-22 NOTE — END OF VISIT
[FreeTextEntry3] : I saw and evaluated the patient.  The findings and assessment were discussed with the resident and I agree with resident’s plan as documented in the above, in the resident’s note.\par \par Pertinent exam findings: Well-appearing, NAD. extensive ulceration of left shin w/ granulation tissue, erythema, yellowish/purulent drainage.  \par \par No fever / chills or other signs of systemic infection.  Patient took off boot due to it being too tight. Has appointment w/ vascular to put boot back on.  Will cover w/ 7 day course of abx for cellulitis.  Non-tender, low concern for deep infection / osteo.  Keflex + Bactrim ordered.  \par \par Referral provided for vascular as she wants a second opinion on how to use the boot.\par \par I spent more than 30 minutes for the total time of this encounter, including time spent face-to-face with the patient, chart review, coordinating care, and documentation.

## 2021-12-22 NOTE — HISTORY OF PRESENT ILLNESS
[FreeTextEntry8] : 71F PMHx BPD, depression/anxiety, ADHD, insomnia, compulsive skin picking of left ankle and right shoulder, hypothyroidism, pre-DM, OA, right hip replacement c/b bleeding leading to anemia, presents for 1-week follow-up appointment with complaint of pain in her left ankle that required her to remove her protective boot. Patient explains that she currently is in no ankle pain without the boot but definitely needs the boot replaced to prevent her from picking at her leg further. Denies any fevers or chills. She was not satisfied with how the ankle was wrapped at her [place] vascular surgeon Dr. Selena Pearce's office. Denies headaches, blurry vision, CP, SOB, n/v/d/c, abd pain.

## 2021-12-27 ENCOUNTER — NON-APPOINTMENT (OUTPATIENT)
Age: 71
End: 2021-12-27

## 2021-12-28 ENCOUNTER — NON-APPOINTMENT (OUTPATIENT)
Age: 71
End: 2021-12-28

## 2022-01-04 ENCOUNTER — EMERGENCY (EMERGENCY)
Facility: HOSPITAL | Age: 72
LOS: 1 days | Discharge: HOME | End: 2022-01-04
Attending: EMERGENCY MEDICINE | Admitting: EMERGENCY MEDICINE
Payer: MEDICARE

## 2022-01-04 ENCOUNTER — APPOINTMENT (OUTPATIENT)
Dept: HEART AND VASCULAR | Facility: CLINIC | Age: 72
End: 2022-01-04

## 2022-01-04 ENCOUNTER — NON-APPOINTMENT (OUTPATIENT)
Age: 72
End: 2022-01-04

## 2022-01-04 VITALS
HEIGHT: 63 IN | SYSTOLIC BLOOD PRESSURE: 129 MMHG | DIASTOLIC BLOOD PRESSURE: 75 MMHG | OXYGEN SATURATION: 97 % | RESPIRATION RATE: 20 BRPM | TEMPERATURE: 98 F | WEIGHT: 179.9 LBS | HEART RATE: 88 BPM

## 2022-01-04 DIAGNOSIS — Z98.890 OTHER SPECIFIED POSTPROCEDURAL STATES: Chronic | ICD-10-CM

## 2022-01-04 DIAGNOSIS — Z96.651 PRESENCE OF RIGHT ARTIFICIAL KNEE JOINT: Chronic | ICD-10-CM

## 2022-01-04 DIAGNOSIS — I10 ESSENTIAL (PRIMARY) HYPERTENSION: ICD-10-CM

## 2022-01-04 DIAGNOSIS — Z20.822 CONTACT WITH AND (SUSPECTED) EXPOSURE TO COVID-19: ICD-10-CM

## 2022-01-04 DIAGNOSIS — E03.9 HYPOTHYROIDISM, UNSPECIFIED: ICD-10-CM

## 2022-01-04 DIAGNOSIS — R06.02 SHORTNESS OF BREATH: ICD-10-CM

## 2022-01-04 DIAGNOSIS — L97.929 NON-PRESSURE CHRONIC ULCER OF UNSPECIFIED PART OF LEFT LOWER LEG WITH UNSPECIFIED SEVERITY: ICD-10-CM

## 2022-01-04 DIAGNOSIS — Z91.018 ALLERGY TO OTHER FOODS: ICD-10-CM

## 2022-01-04 LAB
ALBUMIN SERPL ELPH-MCNC: 3.6 G/DL — SIGNIFICANT CHANGE UP (ref 3.3–5)
ALP SERPL-CCNC: 108 U/L — SIGNIFICANT CHANGE UP (ref 40–120)
ALT FLD-CCNC: 9 U/L — LOW (ref 10–45)
ANION GAP SERPL CALC-SCNC: 10 MMOL/L — SIGNIFICANT CHANGE UP (ref 5–17)
APTT BLD: 35.1 SEC — SIGNIFICANT CHANGE UP (ref 27.5–35.5)
AST SERPL-CCNC: 14 U/L — SIGNIFICANT CHANGE UP (ref 10–40)
BASOPHILS # BLD AUTO: 0.02 K/UL — SIGNIFICANT CHANGE UP (ref 0–0.2)
BASOPHILS NFR BLD AUTO: 0.3 % — SIGNIFICANT CHANGE UP (ref 0–2)
BILIRUB SERPL-MCNC: 0.2 MG/DL — SIGNIFICANT CHANGE UP (ref 0.2–1.2)
BUN SERPL-MCNC: 12 MG/DL — SIGNIFICANT CHANGE UP (ref 7–23)
CALCIUM SERPL-MCNC: 8.7 MG/DL — SIGNIFICANT CHANGE UP (ref 8.4–10.5)
CHLORIDE SERPL-SCNC: 105 MMOL/L — SIGNIFICANT CHANGE UP (ref 96–108)
CO2 SERPL-SCNC: 22 MMOL/L — SIGNIFICANT CHANGE UP (ref 22–31)
CREAT SERPL-MCNC: 0.77 MG/DL — SIGNIFICANT CHANGE UP (ref 0.5–1.3)
CRP SERPL-MCNC: 50.7 MG/L — HIGH (ref 0–4)
EOSINOPHIL # BLD AUTO: 0.14 K/UL — SIGNIFICANT CHANGE UP (ref 0–0.5)
EOSINOPHIL NFR BLD AUTO: 2.2 % — SIGNIFICANT CHANGE UP (ref 0–6)
FERRITIN SERPL-MCNC: 18 NG/ML — SIGNIFICANT CHANGE UP (ref 15–150)
GLUCOSE SERPL-MCNC: 136 MG/DL — HIGH (ref 70–99)
HCT VFR BLD CALC: 28.1 % — LOW (ref 34.5–45)
HGB BLD-MCNC: 7.4 G/DL — LOW (ref 11.5–15.5)
IMM GRANULOCYTES NFR BLD AUTO: 0.6 % — SIGNIFICANT CHANGE UP (ref 0–1.5)
INR BLD: 1.3 — HIGH (ref 0.88–1.16)
LYMPHOCYTES # BLD AUTO: 0.86 K/UL — LOW (ref 1–3.3)
LYMPHOCYTES # BLD AUTO: 13.2 % — SIGNIFICANT CHANGE UP (ref 13–44)
MCHC RBC-ENTMCNC: 24 PG — LOW (ref 27–34)
MCHC RBC-ENTMCNC: 26.3 GM/DL — LOW (ref 32–36)
MCV RBC AUTO: 91.2 FL — SIGNIFICANT CHANGE UP (ref 80–100)
MONOCYTES # BLD AUTO: 0.63 K/UL — SIGNIFICANT CHANGE UP (ref 0–0.9)
MONOCYTES NFR BLD AUTO: 9.7 % — SIGNIFICANT CHANGE UP (ref 2–14)
NEUTROPHILS # BLD AUTO: 4.81 K/UL — SIGNIFICANT CHANGE UP (ref 1.8–7.4)
NEUTROPHILS NFR BLD AUTO: 74 % — SIGNIFICANT CHANGE UP (ref 43–77)
NRBC # BLD: 0 /100 WBCS — SIGNIFICANT CHANGE UP (ref 0–0)
NT-PROBNP SERPL-SCNC: 116 PG/ML — SIGNIFICANT CHANGE UP (ref 0–300)
PLATELET # BLD AUTO: 507 K/UL — HIGH (ref 150–400)
POTASSIUM SERPL-MCNC: 3.6 MMOL/L — SIGNIFICANT CHANGE UP (ref 3.5–5.3)
POTASSIUM SERPL-SCNC: 3.6 MMOL/L — SIGNIFICANT CHANGE UP (ref 3.5–5.3)
PROCALCITONIN SERPL-MCNC: 0.09 NG/ML — SIGNIFICANT CHANGE UP (ref 0.02–0.1)
PROT SERPL-MCNC: 6.6 G/DL — SIGNIFICANT CHANGE UP (ref 6–8.3)
PROTHROM AB SERPL-ACNC: 15.4 SEC — HIGH (ref 10.6–13.6)
RBC # BLD: 3.08 M/UL — LOW (ref 3.8–5.2)
RBC # FLD: 16.4 % — HIGH (ref 10.3–14.5)
SARS-COV-2 RNA SPEC QL NAA+PROBE: NEGATIVE — SIGNIFICANT CHANGE UP
SODIUM SERPL-SCNC: 137 MMOL/L — SIGNIFICANT CHANGE UP (ref 135–145)
TROPONIN T SERPL-MCNC: <0.01 NG/ML — SIGNIFICANT CHANGE UP (ref 0–0.01)
WBC # BLD: 6.5 K/UL — SIGNIFICANT CHANGE UP (ref 3.8–10.5)
WBC # FLD AUTO: 6.5 K/UL — SIGNIFICANT CHANGE UP (ref 3.8–10.5)

## 2022-01-04 PROCEDURE — 80053 COMPREHEN METABOLIC PANEL: CPT

## 2022-01-04 PROCEDURE — 36415 COLL VENOUS BLD VENIPUNCTURE: CPT

## 2022-01-04 PROCEDURE — 86140 C-REACTIVE PROTEIN: CPT

## 2022-01-04 PROCEDURE — 99283 EMERGENCY DEPT VISIT LOW MDM: CPT | Mod: 25

## 2022-01-04 PROCEDURE — 71045 X-RAY EXAM CHEST 1 VIEW: CPT | Mod: 26

## 2022-01-04 PROCEDURE — 84484 ASSAY OF TROPONIN QUANT: CPT

## 2022-01-04 PROCEDURE — 99284 EMERGENCY DEPT VISIT MOD MDM: CPT | Mod: CS

## 2022-01-04 PROCEDURE — 71045 X-RAY EXAM CHEST 1 VIEW: CPT

## 2022-01-04 PROCEDURE — 82728 ASSAY OF FERRITIN: CPT

## 2022-01-04 PROCEDURE — 87635 SARS-COV-2 COVID-19 AMP PRB: CPT

## 2022-01-04 PROCEDURE — 83880 ASSAY OF NATRIURETIC PEPTIDE: CPT

## 2022-01-04 PROCEDURE — 85610 PROTHROMBIN TIME: CPT

## 2022-01-04 PROCEDURE — 84145 PROCALCITONIN (PCT): CPT

## 2022-01-04 PROCEDURE — 85025 COMPLETE CBC W/AUTO DIFF WBC: CPT

## 2022-01-04 PROCEDURE — 93010 ELECTROCARDIOGRAM REPORT: CPT

## 2022-01-04 PROCEDURE — 93005 ELECTROCARDIOGRAM TRACING: CPT

## 2022-01-04 PROCEDURE — 85730 THROMBOPLASTIN TIME PARTIAL: CPT

## 2022-01-04 NOTE — ED ADULT NURSE NOTE - OBJECTIVE STATEMENT
patient reports SOB x 1 week. hx anemia requiring blood transfusion. denies any chest pain. additionally complaining of left leg infection states "im a  and I picked off my skin" L leg redness noted

## 2022-01-04 NOTE — ED ADULT TRIAGE NOTE - CHIEF COMPLAINT QUOTE
Pt presents to ED c/o shortness of breath x 1 week. Reporting "left leg infection", fatigue, shortness of breath. Denies cough, fever, chills, chest pain, runny nose, sore throat.

## 2022-01-04 NOTE — ED PROVIDER NOTE - CLINICAL SUMMARY MEDICAL DECISION MAKING FREE TEXT BOX
71F with a h/o chronic cellulitis (follows Dr. Pearce of [facility] vascular surgery, admitted 12/1-12/3 sent home on 2 weeks of bactrim), bipolar, excoriation disorder, HTN, preDM, mild aortic stenosis, hypothyroidism, hx of right hip surgery in 7/2021 c/b infection Afib, and DVT (previously on Eliquis but d/c'd i/s/o anemia), anemia (s/p 1 unit blood transfusion during last visit) c/o SOB x 1 week. Associated with fatigue.  Pt was concerned she could be anemic.  Denies fever, chills, cough, cp, n/v/d, palpitations. VSS. Lungs cta b/l. +large ulceration to LLE. Labs, cxr.

## 2022-01-04 NOTE — ED PROVIDER NOTE - PROGRESS NOTE DETAILS
hgb 7.4, will not transfuse. COVID neg. CXR clear. Amb sat 97%. leg appears chronic, no indication for abx at this time.

## 2022-01-04 NOTE — ED PROVIDER NOTE - OBJECTIVE STATEMENT
71F with a h/o chronic cellulitis (follows Dr. Pearce of [facility] vascular surgery, admitted 12/1-12/3 sent home on 2 weeks of bactrim), bipolar, excoriation disorder, HTN, preDM, mild aortic stenosis, hypothyroidism, hx of right hip surgery in 7/2021 c/b infection Afib, and DVT (previously on Eliquis but d/c'd i/s/o anemia), anemia (s/p 1 unit blood transfusion during last visit) c/o SOB x 1 week. Associated with fatigue.  Pt was concerned she could be anemic.  Reports feeling warm in the late afternoons but tmax 99. Also c/o non-healing L leg wound. Denies chills, cough, cp, n/v/d, palpitations.

## 2022-01-04 NOTE — ED PROVIDER NOTE - NSFOLLOWUPINSTRUCTIONS_ED_ALL_ED_FT
follow up with your primary doctor and a vascular specialist.     Shortness of breath    Shortness of breath (dyspnea) means you have trouble breathing and could indicate a medical problem. Causes include lung disease, heart disease, low amount of red blood cells (anemia), poor physical fitness, being overweight, smoking, etc. Your health care provider today may not be able to find a cause for your shortness of breath after your exam. In this case, it is important to have a follow-up exam with your primary care physician as instructed. If medicines were prescribed, take them as directed for the full length of time directed. Refrain from tobacco products.    SEEK IMMEDIATE MEDICAL CARE IF YOU HAVE ANY OF THE FOLLOWING SYMPTOMS: worsening shortness of breath, chest pain, back pain, abdominal pain, fever, coughing up blood, lightheadedness/dizziness.

## 2022-01-04 NOTE — ED PROVIDER NOTE - ATTENDING CONTRIBUTION TO CARE
Attending Statement: I have personally performed a face to face diagnostic evaluation on this patient. I have reviewed the ACP note and agree with the history, exam and plan of care, except as noted.     Attending Contribution to Care:  71F with a h/o chronic cellulitis (follows Dr. Pearce of [facility] vascular surgery, admitted 12/1-12/3 sent home on 2 weeks of bactrim), bipolar, excoriation disorder, HTN, preDM, mild aortic stenosis, hypothyroidism, hx of right hip surgery in 7/2021 c/b infection Afib, and DVT (previously on Eliquis but d/c'd i/s/o anemia), anemia (s/p 1 unit blood transfusion during last visit) c/o SOB x 1 week. Associated with fatigue.  Pt was concerned she could be anemic.  Agree with PA exam, pt with no emergent findings on labs and CXR, wound appears chronic and not acutely infected. Pt stable for DC with outpt follow up.

## 2022-01-04 NOTE — ED PROVIDER NOTE - PHYSICAL EXAMINATION
CONSTITUTIONAL: obese female laying in bed, NAD   HEAD: Normocephalic; atraumatic.   EYES: PERRL; EOM intact; conjunctiva and sclera clear  ENT: normal nose; no rhinorrhea; normal pharynx with no erythema or lesions.   NECK: Supple; non-tender; no LAD  CARDIOVASCULAR: Normal S1, S2; No audible murmurs. Regular rate and rhythm.   RESPIRATORY: Breathing easily; breath sounds clear and equal bilaterally; no wheezes, rhonchi, or rales.  GI: Soft; non-distended; non-tender; no palpable organomegaly.   MSK: FROM at all extremities, normal tone   EXT: LLE- large ulcer on anterior shin extending from below knee  to ankle with slight surrounding erythema, weeping, no foul smell   SKIN: Normal for age and race; warm; dry; good turgor; no apparent lesions or rash.   NEURO: A & O x 3; face symmetric; grossly unremarkable.   PSYCHOLOGICAL: The patient’s mood and manner are appropriate. CONSTITUTIONAL: obese female laying in bed, NAD   HEAD: Normocephalic; atraumatic.   EYES: PERRL; EOM intact; conjunctiva and sclera clear  ENT: normal nose; no rhinorrhea; normal pharynx with no erythema or lesions.   NECK: Supple; non-tender; no LAD  CARDIOVASCULAR: Normal S1, S2; No audible murmurs. Regular rate and rhythm.   RESPIRATORY: Breathing easily; breath sounds clear and equal bilaterally; no wheezes, rhonchi, or rales.  GI: Soft; non-distended; non-tender; no palpable organomegaly.   MSK: FROM at all extremities, normal tone   EXT: LLE- large ulcer on anterior shin extending from below knee  to ankle with slight surrounding erythema, weeping, no foul smell, no purulent drainage, +excoriations   SKIN: Normal for age and race; warm; dry; good turgor; no apparent lesions or rash.   NEURO: A & O x 3; face symmetric; grossly unremarkable.   PSYCHOLOGICAL: The patient’s mood and manner are appropriate.

## 2022-01-05 ENCOUNTER — NON-APPOINTMENT (OUTPATIENT)
Age: 72
End: 2022-01-05

## 2022-01-06 ENCOUNTER — APPOINTMENT (OUTPATIENT)
Dept: HEART AND VASCULAR | Facility: CLINIC | Age: 72
End: 2022-01-06

## 2022-01-06 ENCOUNTER — APPOINTMENT (OUTPATIENT)
Dept: INTERNAL MEDICINE | Facility: CLINIC | Age: 72
End: 2022-01-06
Payer: MEDICARE

## 2022-01-06 ENCOUNTER — NON-APPOINTMENT (OUTPATIENT)
Age: 72
End: 2022-01-06

## 2022-01-06 DIAGNOSIS — S91.002A UNSPECIFIED OPEN WOUND, LEFT ANKLE, INITIAL ENCOUNTER: ICD-10-CM

## 2022-01-06 DIAGNOSIS — D50.8 OTHER IRON DEFICIENCY ANEMIAS: ICD-10-CM

## 2022-01-06 PROCEDURE — 99441: CPT | Mod: GC,95

## 2022-01-06 NOTE — HISTORY OF PRESENT ILLNESS
[Home] : at home, [unfilled] , at the time of the visit. [Medical Office: ([facility])___] : at the medical office located in  [Verbal consent obtained from patient] : the patient, [unfilled] [FreeTextEntry8] : RAFA DONAHUE,an established pt at this office, reached out to this provider for medical f/u. \par \par No recent visit within the last 7 days. A visit is not scheduled within the next 7 days at this time. \par \par Discussed with patient: You have chosen to receive care through the use of tele-media. Tele-media enables health care providers at different locations to provide safe, effective, and convenient care through the use of technology. Please note this is a billable encounter. As with any health care service, there are risks associated with the use of tele-media, including equipment failure, poor image and/or resolution, and  issues. You understand that I cannot physically examine you and that you may need to come to the clinic to complete the assessment. Patient agreed verbally to understanding the risks and benefits of tele-media as explained. All questions regarding tele-media encounters were answered.\par \par 71F PMHx BPD, depression/anxiety, ADHD, insomnia, compulsive skin picking of left ankle and right shoulder, hypothyroidism, pre-DM, OA, right hip replacement c/b bleeding leading to anemia, presents after recent ER visit due to concern for leg infection. ED labs noteable for normal wbc, stable hgb, negative covid pcr. Pt reports she completed her course of bactrim + keflex however still thinks her leg is infected. She denies fever, pus, however reports there is still some erythema. She was also referred to vascular and has an apt with them today. She still endorses some fatigue, the rest of her ROS is negative. \par

## 2022-01-06 NOTE — ASSESSMENT
[FreeTextEntry1] : 71F PMHx BPD, depression/anxiety, ADHD, insomnia, compulsive skin picking of left ankle and right shoulder, hypothyroidism, pre-DM, OA, right hip replacement c/b bleeding leading to anemia, presents after recent ER visit due to concern for leg infection.\par \par #LLE wound - completed course of Abx 7d, seeing vascular today, advised pt to keep apt and follow vascular recs \par \par #Fatigue - labs from ER noted to be stable including hgb which is around her baseline, advised to make in person apt after vascular eval for further evaluation if needed\par \par #BPD - c/w psych f/u and meds prescribed by them

## 2022-01-06 NOTE — END OF VISIT
[FreeTextEntry3] : I was present with the Resident during the key portions of this encounter and provided supervision via audio/visual technology.  I agree with the findings and plan as documented in the Resident's note, unless noted below. \par \par Fatigue - s/p ED visit, labs at baseline.  No concern for ongoing infection.  Completed course of Abx.  To see vascular today.  Advised to follow up in person after vascular eval to determine if further treatment is needed.\par \par Psychosis: follows w/ psych, on extensive regimen.\par \par I spent more than 5 minutes for the total time of this encounter, including time spent on phone w/ patient, chart review, and documentation.

## 2022-01-06 NOTE — REVIEW OF SYSTEMS
[Fatigue] : fatigue [Negative] : Heme/Lymph [Fever] : no fever [Chills] : no chills [Wheezing] : no wheezing [Cough] : no cough [Abdominal Pain] : no abdominal pain [Nausea] : no nausea [Vomiting] : no vomiting [Melena] : no melena [Dysuria] : no dysuria [Hematuria] : no hematuria

## 2022-01-06 NOTE — PHYSICAL EXAM
[No Acute Distress] : no acute distress [Speech Grossly Normal] : speech grossly normal [Alert and Oriented x3] : oriented to person, place, and time

## 2022-01-10 ENCOUNTER — APPOINTMENT (OUTPATIENT)
Dept: INTERNAL MEDICINE | Facility: CLINIC | Age: 72
End: 2022-01-10

## 2022-01-18 ENCOUNTER — INPATIENT (INPATIENT)
Facility: HOSPITAL | Age: 72
LOS: 2 days | Discharge: HOME HEALTH | DRG: 812 | End: 2022-01-21
Attending: HOSPITALIST | Admitting: STUDENT IN AN ORGANIZED HEALTH CARE EDUCATION/TRAINING PROGRAM
Payer: MEDICARE

## 2022-01-18 VITALS
WEIGHT: 169.98 LBS | RESPIRATION RATE: 18 BRPM | DIASTOLIC BLOOD PRESSURE: 88 MMHG | SYSTOLIC BLOOD PRESSURE: 126 MMHG | HEIGHT: 63 IN | HEART RATE: 94 BPM | OXYGEN SATURATION: 98 % | TEMPERATURE: 98 F

## 2022-01-18 DIAGNOSIS — Z86.718 PERSONAL HISTORY OF OTHER VENOUS THROMBOSIS AND EMBOLISM: ICD-10-CM

## 2022-01-18 DIAGNOSIS — E03.9 HYPOTHYROIDISM, UNSPECIFIED: ICD-10-CM

## 2022-01-18 DIAGNOSIS — Z98.890 OTHER SPECIFIED POSTPROCEDURAL STATES: Chronic | ICD-10-CM

## 2022-01-18 DIAGNOSIS — L03.116 CELLULITIS OF LEFT LOWER LIMB: ICD-10-CM

## 2022-01-18 DIAGNOSIS — Z00.00 ENCOUNTER FOR GENERAL ADULT MEDICAL EXAMINATION WITHOUT ABNORMAL FINDINGS: ICD-10-CM

## 2022-01-18 DIAGNOSIS — F31.9 BIPOLAR DISORDER, UNSPECIFIED: ICD-10-CM

## 2022-01-18 DIAGNOSIS — D50.9 IRON DEFICIENCY ANEMIA, UNSPECIFIED: ICD-10-CM

## 2022-01-18 DIAGNOSIS — Z96.651 PRESENCE OF RIGHT ARTIFICIAL KNEE JOINT: Chronic | ICD-10-CM

## 2022-01-18 DIAGNOSIS — F42.4 EXCORIATION (SKIN-PICKING) DISORDER: ICD-10-CM

## 2022-01-18 DIAGNOSIS — L03.115 CELLULITIS OF RIGHT LOWER LIMB: ICD-10-CM

## 2022-01-18 DIAGNOSIS — I35.0 NONRHEUMATIC AORTIC (VALVE) STENOSIS: ICD-10-CM

## 2022-01-18 DIAGNOSIS — E11.9 TYPE 2 DIABETES MELLITUS WITHOUT COMPLICATIONS: ICD-10-CM

## 2022-01-18 LAB
ALBUMIN SERPL ELPH-MCNC: 3.6 G/DL — SIGNIFICANT CHANGE UP (ref 3.3–5)
ALP SERPL-CCNC: 94 U/L — SIGNIFICANT CHANGE UP (ref 40–120)
ALT FLD-CCNC: 7 U/L — LOW (ref 10–45)
ANION GAP SERPL CALC-SCNC: 9 MMOL/L — SIGNIFICANT CHANGE UP (ref 5–17)
ANISOCYTOSIS BLD QL: SLIGHT — SIGNIFICANT CHANGE UP
APTT BLD: 34.3 SEC — SIGNIFICANT CHANGE UP (ref 27.5–35.5)
AST SERPL-CCNC: 12 U/L — SIGNIFICANT CHANGE UP (ref 10–40)
BASOPHILS # BLD AUTO: 0 K/UL — SIGNIFICANT CHANGE UP (ref 0–0.2)
BASOPHILS NFR BLD AUTO: 0 % — SIGNIFICANT CHANGE UP (ref 0–2)
BILIRUB SERPL-MCNC: 0.2 MG/DL — SIGNIFICANT CHANGE UP (ref 0.2–1.2)
BLD GP AB SCN SERPL QL: POSITIVE — SIGNIFICANT CHANGE UP
BUN SERPL-MCNC: 13 MG/DL — SIGNIFICANT CHANGE UP (ref 7–23)
CALCIUM SERPL-MCNC: 8.6 MG/DL — SIGNIFICANT CHANGE UP (ref 8.4–10.5)
CHLORIDE SERPL-SCNC: 106 MMOL/L — SIGNIFICANT CHANGE UP (ref 96–108)
CO2 SERPL-SCNC: 22 MMOL/L — SIGNIFICANT CHANGE UP (ref 22–31)
CREAT SERPL-MCNC: 0.76 MG/DL — SIGNIFICANT CHANGE UP (ref 0.5–1.3)
EOSINOPHIL # BLD AUTO: 0.14 K/UL — SIGNIFICANT CHANGE UP (ref 0–0.5)
EOSINOPHIL NFR BLD AUTO: 1.7 % — SIGNIFICANT CHANGE UP (ref 0–6)
GIANT PLATELETS BLD QL SMEAR: PRESENT — SIGNIFICANT CHANGE UP
GLUCOSE BLDC GLUCOMTR-MCNC: 121 MG/DL — HIGH (ref 70–99)
GLUCOSE BLDC GLUCOMTR-MCNC: 130 MG/DL — HIGH (ref 70–99)
GLUCOSE SERPL-MCNC: 106 MG/DL — HIGH (ref 70–99)
HCT VFR BLD CALC: 24.7 % — LOW (ref 34.5–45)
HCT VFR BLD CALC: 26.6 % — LOW (ref 34.5–45)
HGB BLD-MCNC: 6.9 G/DL — CRITICAL LOW (ref 11.5–15.5)
HGB BLD-MCNC: 7.7 G/DL — LOW (ref 11.5–15.5)
HYPOCHROMIA BLD QL: SIGNIFICANT CHANGE UP
INR BLD: 1.43 — HIGH (ref 0.88–1.16)
LYMPHOCYTES # BLD AUTO: 0.85 K/UL — LOW (ref 1–3.3)
LYMPHOCYTES # BLD AUTO: 10.3 % — LOW (ref 13–44)
MANUAL SMEAR VERIFICATION: SIGNIFICANT CHANGE UP
MCHC RBC-ENTMCNC: 24.2 PG — LOW (ref 27–34)
MCHC RBC-ENTMCNC: 25.1 PG — LOW (ref 27–34)
MCHC RBC-ENTMCNC: 27.9 GM/DL — LOW (ref 32–36)
MCHC RBC-ENTMCNC: 28.9 GM/DL — LOW (ref 32–36)
MCV RBC AUTO: 86.6 FL — SIGNIFICANT CHANGE UP (ref 80–100)
MCV RBC AUTO: 86.7 FL — SIGNIFICANT CHANGE UP (ref 80–100)
MICROCYTES BLD QL: SLIGHT — SIGNIFICANT CHANGE UP
MONOCYTES # BLD AUTO: 0.43 K/UL — SIGNIFICANT CHANGE UP (ref 0–0.9)
MONOCYTES NFR BLD AUTO: 5.2 % — SIGNIFICANT CHANGE UP (ref 2–14)
NEUTROPHILS # BLD AUTO: 6.85 K/UL — SIGNIFICANT CHANGE UP (ref 1.8–7.4)
NEUTROPHILS NFR BLD AUTO: 82.8 % — HIGH (ref 43–77)
NRBC # BLD: 0 /100 WBCS — SIGNIFICANT CHANGE UP (ref 0–0)
OVALOCYTES BLD QL SMEAR: SIGNIFICANT CHANGE UP
PLAT MORPH BLD: ABNORMAL
PLATELET # BLD AUTO: 523 K/UL — HIGH (ref 150–400)
PLATELET # BLD AUTO: 551 K/UL — HIGH (ref 150–400)
POIKILOCYTOSIS BLD QL AUTO: SIGNIFICANT CHANGE UP
POLYCHROMASIA BLD QL SMEAR: SIGNIFICANT CHANGE UP
POTASSIUM SERPL-MCNC: 3.8 MMOL/L — SIGNIFICANT CHANGE UP (ref 3.5–5.3)
POTASSIUM SERPL-SCNC: 3.8 MMOL/L — SIGNIFICANT CHANGE UP (ref 3.5–5.3)
PROT SERPL-MCNC: 6.6 G/DL — SIGNIFICANT CHANGE UP (ref 6–8.3)
PROTHROM AB SERPL-ACNC: 16.9 SEC — HIGH (ref 10.6–13.6)
RBC # BLD: 2.85 M/UL — LOW (ref 3.8–5.2)
RBC # BLD: 3.07 M/UL — LOW (ref 3.8–5.2)
RBC # FLD: 14.9 % — HIGH (ref 10.3–14.5)
RBC # FLD: 15.4 % — HIGH (ref 10.3–14.5)
RBC BLD AUTO: ABNORMAL
RH IG SCN BLD-IMP: POSITIVE — SIGNIFICANT CHANGE UP
SARS-COV-2 RNA SPEC QL NAA+PROBE: SIGNIFICANT CHANGE UP
SODIUM SERPL-SCNC: 137 MMOL/L — SIGNIFICANT CHANGE UP (ref 135–145)
WBC # BLD: 8.27 K/UL — SIGNIFICANT CHANGE UP (ref 3.8–10.5)
WBC # BLD: 8.86 K/UL — SIGNIFICANT CHANGE UP (ref 3.8–10.5)
WBC # FLD AUTO: 8.27 K/UL — SIGNIFICANT CHANGE UP (ref 3.8–10.5)
WBC # FLD AUTO: 8.86 K/UL — SIGNIFICANT CHANGE UP (ref 3.8–10.5)

## 2022-01-18 PROCEDURE — 71046 X-RAY EXAM CHEST 2 VIEWS: CPT | Mod: 26

## 2022-01-18 PROCEDURE — 99285 EMERGENCY DEPT VISIT HI MDM: CPT | Mod: FS

## 2022-01-18 PROCEDURE — 86077 PHYS BLOOD BANK SERV XMATCH: CPT

## 2022-01-18 RX ORDER — LITHIUM CARBONATE 300 MG/1
150 TABLET, EXTENDED RELEASE ORAL EVERY 24 HOURS
Refills: 0 | Status: DISCONTINUED | OUTPATIENT
Start: 2022-01-18 | End: 2022-01-21

## 2022-01-18 RX ORDER — TOPIRAMATE 25 MG
75 TABLET ORAL EVERY 24 HOURS
Refills: 0 | Status: DISCONTINUED | OUTPATIENT
Start: 2022-01-18 | End: 2022-01-18

## 2022-01-18 RX ORDER — QUETIAPINE FUMARATE 200 MG/1
75 TABLET, FILM COATED ORAL AT BEDTIME
Refills: 0 | Status: DISCONTINUED | OUTPATIENT
Start: 2022-01-18 | End: 2022-01-21

## 2022-01-18 RX ORDER — FOLIC ACID 7.5 MG
1 TABLET ORAL
Qty: 0 | Refills: 0 | DISCHARGE

## 2022-01-18 RX ORDER — CEFAZOLIN SODIUM 1 G
1000 VIAL (EA) INJECTION EVERY 8 HOURS
Refills: 0 | Status: DISCONTINUED | OUTPATIENT
Start: 2022-01-18 | End: 2022-01-21

## 2022-01-18 RX ORDER — DEXTROSE 50 % IN WATER 50 %
12.5 SYRINGE (ML) INTRAVENOUS ONCE
Refills: 0 | Status: DISCONTINUED | OUTPATIENT
Start: 2022-01-18 | End: 2022-01-21

## 2022-01-18 RX ORDER — ACETAMINOPHEN 500 MG
975 TABLET ORAL ONCE
Refills: 0 | Status: COMPLETED | OUTPATIENT
Start: 2022-01-18 | End: 2022-01-18

## 2022-01-18 RX ORDER — DEXTROSE 50 % IN WATER 50 %
25 SYRINGE (ML) INTRAVENOUS ONCE
Refills: 0 | Status: DISCONTINUED | OUTPATIENT
Start: 2022-01-18 | End: 2022-01-21

## 2022-01-18 RX ORDER — INSULIN LISPRO 100/ML
VIAL (ML) SUBCUTANEOUS
Refills: 0 | Status: DISCONTINUED | OUTPATIENT
Start: 2022-01-18 | End: 2022-01-21

## 2022-01-18 RX ORDER — VENLAFAXINE HCL 75 MG
2 CAPSULE, EXT RELEASE 24 HR ORAL
Qty: 0 | Refills: 0 | DISCHARGE

## 2022-01-18 RX ORDER — FERROUS SULFATE 325(65) MG
1 TABLET ORAL
Qty: 0 | Refills: 0 | DISCHARGE

## 2022-01-18 RX ORDER — CLONAZEPAM 1 MG
3 TABLET ORAL AT BEDTIME
Refills: 0 | Status: DISCONTINUED | OUTPATIENT
Start: 2022-01-18 | End: 2022-01-21

## 2022-01-18 RX ORDER — GLUCAGON INJECTION, SOLUTION 0.5 MG/.1ML
1 INJECTION, SOLUTION SUBCUTANEOUS ONCE
Refills: 0 | Status: DISCONTINUED | OUTPATIENT
Start: 2022-01-18 | End: 2022-01-21

## 2022-01-18 RX ORDER — BUPROPION HYDROCHLORIDE 150 MG/1
300 TABLET, EXTENDED RELEASE ORAL EVERY 24 HOURS
Refills: 0 | Status: DISCONTINUED | OUTPATIENT
Start: 2022-01-19 | End: 2022-01-21

## 2022-01-18 RX ORDER — TOPIRAMATE 25 MG
75 TABLET ORAL EVERY 24 HOURS
Refills: 0 | Status: DISCONTINUED | OUTPATIENT
Start: 2022-01-18 | End: 2022-01-21

## 2022-01-18 RX ORDER — CEFAZOLIN SODIUM 1 G
1000 VIAL (EA) INJECTION ONCE
Refills: 0 | Status: COMPLETED | OUTPATIENT
Start: 2022-01-18 | End: 2022-01-18

## 2022-01-18 RX ORDER — SODIUM CHLORIDE 9 MG/ML
1000 INJECTION, SOLUTION INTRAVENOUS
Refills: 0 | Status: DISCONTINUED | OUTPATIENT
Start: 2022-01-18 | End: 2022-01-21

## 2022-01-18 RX ORDER — LITHIUM CARBONATE 300 MG/1
150 TABLET, EXTENDED RELEASE ORAL
Qty: 0 | Refills: 0 | DISCHARGE

## 2022-01-18 RX ORDER — LIOTHYRONINE SODIUM 25 UG/1
1 TABLET ORAL
Qty: 0 | Refills: 0 | DISCHARGE

## 2022-01-18 RX ORDER — LITHIUM CARBONATE 300 MG/1
150 TABLET, EXTENDED RELEASE ORAL EVERY 24 HOURS
Refills: 0 | Status: DISCONTINUED | OUTPATIENT
Start: 2022-01-18 | End: 2022-01-18

## 2022-01-18 RX ORDER — BUPROPION HYDROCHLORIDE 150 MG/1
3 TABLET, EXTENDED RELEASE ORAL
Qty: 0 | Refills: 0 | DISCHARGE

## 2022-01-18 RX ORDER — LITHIUM CARBONATE 300 MG/1
1 TABLET, EXTENDED RELEASE ORAL
Qty: 0 | Refills: 0 | DISCHARGE

## 2022-01-18 RX ORDER — DEXTROSE 50 % IN WATER 50 %
15 SYRINGE (ML) INTRAVENOUS ONCE
Refills: 0 | Status: DISCONTINUED | OUTPATIENT
Start: 2022-01-18 | End: 2022-01-21

## 2022-01-18 RX ORDER — VENLAFAXINE HCL 75 MG
300 CAPSULE, EXT RELEASE 24 HR ORAL EVERY 24 HOURS
Refills: 0 | Status: DISCONTINUED | OUTPATIENT
Start: 2022-01-18 | End: 2022-01-18

## 2022-01-18 RX ORDER — LEVOTHYROXINE SODIUM 125 MCG
12.5 TABLET ORAL EVERY 24 HOURS
Refills: 0 | Status: DISCONTINUED | OUTPATIENT
Start: 2022-01-18 | End: 2022-01-18

## 2022-01-18 RX ORDER — QUETIAPINE FUMARATE 200 MG/1
3 TABLET, FILM COATED ORAL
Qty: 0 | Refills: 0 | DISCHARGE

## 2022-01-18 RX ORDER — VENLAFAXINE HCL 75 MG
3 CAPSULE, EXT RELEASE 24 HR ORAL
Qty: 0 | Refills: 0 | DISCHARGE

## 2022-01-18 RX ORDER — METHYLPHENIDATE HCL 5 MG
1 TABLET ORAL
Qty: 0 | Refills: 0 | DISCHARGE

## 2022-01-18 RX ORDER — VENLAFAXINE HCL 75 MG
300 CAPSULE, EXT RELEASE 24 HR ORAL EVERY 24 HOURS
Refills: 0 | Status: DISCONTINUED | OUTPATIENT
Start: 2022-01-19 | End: 2022-01-21

## 2022-01-18 RX ORDER — BACITRACIN ZINC 500 UNIT/G
1 OINTMENT IN PACKET (EA) TOPICAL THREE TIMES A DAY
Refills: 0 | Status: DISCONTINUED | OUTPATIENT
Start: 2022-01-18 | End: 2022-01-19

## 2022-01-18 RX ORDER — LIOTHYRONINE SODIUM 25 UG/1
12.5 TABLET ORAL EVERY 24 HOURS
Refills: 0 | Status: DISCONTINUED | OUTPATIENT
Start: 2022-01-19 | End: 2022-01-21

## 2022-01-18 RX ORDER — LEVOTHYROXINE SODIUM 125 MCG
1 TABLET ORAL
Qty: 0 | Refills: 0 | DISCHARGE

## 2022-01-18 RX ORDER — LANOLIN ALCOHOL/MO/W.PET/CERES
1 CREAM (GRAM) TOPICAL
Qty: 0 | Refills: 0 | DISCHARGE

## 2022-01-18 RX ORDER — LEVOTHYROXINE SODIUM 125 MCG
124.5 TABLET ORAL DAILY
Refills: 0 | Status: DISCONTINUED | OUTPATIENT
Start: 2022-01-18 | End: 2022-01-18

## 2022-01-18 RX ORDER — LEVOTHYROXINE SODIUM 125 MCG
112 TABLET ORAL EVERY 24 HOURS
Refills: 0 | Status: DISCONTINUED | OUTPATIENT
Start: 2022-01-19 | End: 2022-01-21

## 2022-01-18 RX ADMIN — LITHIUM CARBONATE 150 MILLIGRAM(S): 300 TABLET, EXTENDED RELEASE ORAL at 19:02

## 2022-01-18 RX ADMIN — Medication 100 MILLIGRAM(S): at 13:23

## 2022-01-18 RX ADMIN — QUETIAPINE FUMARATE 75 MILLIGRAM(S): 200 TABLET, FILM COATED ORAL at 21:32

## 2022-01-18 RX ADMIN — Medication 100 MILLIGRAM(S): at 21:32

## 2022-01-18 RX ADMIN — Medication 3 MILLIGRAM(S): at 21:34

## 2022-01-18 RX ADMIN — Medication 75 MILLIGRAM(S): at 19:02

## 2022-01-18 NOTE — H&P ADULT - PROBLEM SELECTOR PLAN 2
Management as above  -physical exam shows R hip erythema with granulation tissue, no purulence or fluctuance. Mild tenderness to palpation

## 2022-01-18 NOTE — H&P ADULT - PROBLEM SELECTOR PLAN 1
-patient with chronic LLE cellulitis, which she states has been ongoing for the past few years  -patient last admitted to [facility] 12/2021 and received Vancomycin during admission, discharged with 2 week course of Bactrim  -patient states she has been picking at her skin and believes she noted some pus yesterday, which prompted her visit to the ED today  -per previous notes, patient has followed with Dr. Selena Pearce of [facility] vascular surgery  -two weeks ago, patient called [facility], who prescribed her Amoxicillin, which she took 2-3 days of, but stopped due to GI upset  -in the ED, patient afebrile with no leukocytosis  -on physical exam, LLE with erythema from foot up to knee, with overlying granulation tissue and dried blood. No purulence or fluctuance noted on exam  -s/p Cefazolin in the ED -patient with chronic LLE cellulitis, which she states has been ongoing for the past few years  -patient last admitted to [facility] 12/2021 and received Vancomycin during admission, discharged with 2 week course of Bactrim  -patient states she has been picking at her skin and believes she noted some pus yesterday, which prompted her visit to the ED today  -per previous notes, patient has followed with Dr. Selena Pearce of [facility] vascular surgery  -two weeks ago, patient called [facility], who prescribed her Amoxicillin, which she took 2-3 days of, but stopped due to GI upset (did not complete course)  -in the ED, patient afebrile with no leukocytosis  -on physical exam, LLE with erythema from foot up to knee, with overlying granulation tissue and dried blood. No purulence or fluctuance noted on exam  -s/p Cefazolin in the ED  -continue with Cefazolin 1000mg q8h for 5 days  -f/u wound care recommendations
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
## 2022-01-18 NOTE — H&P ADULT - PROBLEM SELECTOR PLAN 10
F:   E: Maintain K>4, Mg>2  N: carb consistent  D: SCDs, given hx of anemia  Dispo: [facility] F: encourage PO intake  E: Maintain K>4, Mg>2  N: regular  D: SCDs, given hx of anemia  Dispo: [facility] F: encourage PO intake  E: Maintain K>4, Mg>2  N: regular  D: holding given hx of anemia  Dispo: [facility]

## 2022-01-18 NOTE — ED PROVIDER NOTE - OBJECTIVE STATEMENT
71F with a h/o chronic cellulitis (admitted 12/1-12/3 sent home on 2 weeks of bactrim), bipolar, excoriation disorder, HTN, preDM, mild aortic stenosis, hypothyroidism, hx of right hip surgery in 7/2021 c/b infection Afib, and DVT (previously on Eliquis but d/c'd i/s/o anemia), anemia, seen again for leg pain on 1/4 labs at baseline presents to ED for draining from LLE x 2 days. Pt states she has been picking at her skin. Pt called [facility] 1 week ago and was given amoxicillin. Pt took it for 3 days but stopped due to GI symptoms. States she thought she saw pus from the wound yesterday and applied bacitracin which helped. Pt reports chronic anemia (had an iron transfusion last week). Pt repots some fatigue and sob, concerned her hemoglobinmay be low. Pt  also wants to have her R hip wound checked. Pt states she picks at her incision and thinks it may be acutely infected. Pt states the redness around the wound seems worse. Denies fever, chills, cp, dizziness, HA, URI symptoms. 71F with a h/o chronic cellulitis (admitted 12/1-12/3 sent home on 2 weeks of bactrim), bipolar, excoriation disorder, HTN, preDM, mild aortic stenosis, hypothyroidism, hx of right hip surgery in 7/2021 c/b infection Afib, and DVT (previously on Eliquis but d/c'd i/s/o anemia), anemia, seen again for leg pain on 1/4 labs at baseline presents to ED for draining from LLE x 2 days. Pt states she has been picking at her skin. Pt called [facility] 1 week ago and was given amoxicillin. Pt took it for 3 days but stopped due to GI symptoms. States she thought she saw pus from the wound yesterday and applied bacitracin which helped. Pt reports chronic anemia (had an iron transfusion last week). Pt repots some fatigue and sob, concerned her hemoglobin may be low. Pt  also wants to have her R hip wound checked. Pt states she picks at her incision and thinks it may be acutely infected. Pt states the redness around the wound seems worse. Denies fever, chills, cp, dizziness, HA, URI symptoms.

## 2022-01-18 NOTE — ED ADULT TRIAGE NOTE - CHIEF COMPLAINT QUOTE
pt here for RT hip and LT lower leg pain, swelling and drainage since yesterday, pt concerned about infection, denies any fevers, n, v,

## 2022-01-18 NOTE — H&P ADULT - NSHPLABSRESULTS_GEN_ALL_CORE
.  LABS:                         6.9    8.27  )-----------( 551      ( 18 Jan 2022 11:46 )             24.7     01-18    137  |  106  |  13  ----------------------------<  106<H>  3.8   |  22  |  0.76    Ca    8.6      18 Jan 2022 11:46    TPro  6.6  /  Alb  3.6  /  TBili  0.2  /  DBili  x   /  AST  12  /  ALT  7<L>  /  AlkPhos  94  01-18    PT/INR - ( 18 Jan 2022 11:46 )   PT: 16.9 sec;   INR: 1.43          PTT - ( 18 Jan 2022 11:46 )  PTT:34.3 sec          RADIOLOGY, EKG & ADDITIONAL TESTS: Reviewed.

## 2022-01-18 NOTE — PATIENT PROFILE ADULT - FALL HARM RISK - HARM RISK INTERVENTIONS

## 2022-01-18 NOTE — H&P ADULT - NSHPPHYSICALEXAM_GEN_ALL_CORE
.  VITAL SIGNS:  T(C): 36.7 (01-18-22 @ 13:55), Max: 36.9 (01-18-22 @ 10:57)  T(F): 98 (01-18-22 @ 13:55), Max: 98.4 (01-18-22 @ 10:57)  HR: 81 (01-18-22 @ 13:55) (81 - 94)  BP: 113/63 (01-18-22 @ 13:55) (113/63 - 126/88)  BP(mean): --  RR: 18 (01-18-22 @ 13:55) (18 - 18)  SpO2: 97% (01-18-22 @ 13:55) (97% - 98%)  Wt(kg): --    PHYSICAL EXAM:    Constitutional: WDWN resting comfortably in bed; mildly anxious  Head: NC/AT  Eyes: PERRL, EOMI, anicteric sclera  ENT: no nasal discharge; no oropharyngeal erythema or exudates; MMM  Neck: supple; no JVD   Respiratory: CTA B/L; no W/R/R  Cardiac: +S1/S2; RRR; no M/R/G  Gastrointestinal: abdomen soft, NT/ND; no rebound or guarding; +BSx4  Rectal: No blood, small amount of mucous, no stool  Extremities: RLE WNL. LLE: erythema extending from foot to knee, with granulation tissue scattered throughout and dried blood. No purulence or fluctuance noted. R hip with erythema and granulation tissue, no purulence  Musculoskeletal: NROM x4  Vascular: 2+ DP/PT pulses B/L  Neurologic: AAOx3; CNII-XII grossly intact; no focal deficits .  VITAL SIGNS:  T(C): 36.7 (01-18-22 @ 13:55), Max: 36.9 (01-18-22 @ 10:57)  T(F): 98 (01-18-22 @ 13:55), Max: 98.4 (01-18-22 @ 10:57)  HR: 81 (01-18-22 @ 13:55) (81 - 94)  BP: 113/63 (01-18-22 @ 13:55) (113/63 - 126/88)  BP(mean): --  RR: 18 (01-18-22 @ 13:55) (18 - 18)  SpO2: 97% (01-18-22 @ 13:55) (97% - 98%)  Wt(kg): --    PHYSICAL EXAM:    Constitutional: WDWN resting comfortably in bed; mildly anxious  Head: NC/AT  Eyes: PERRL, EOMI, anicteric sclera  ENT: no nasal discharge; no oropharyngeal erythema or exudates; MMM  Neck: supple; no JVD   Respiratory: CTA B/L; no W/R/R  Cardiac: +S1/S2; RRR; +systolic murmur  Gastrointestinal: abdomen soft, NT/ND; no rebound or guarding; +BSx4  Rectal: No blood, small amount of mucous, no stool  Extremities: RLE WNL. LLE: erythema extending from foot to knee, with granulation tissue scattered throughout and dried blood. No purulence or fluctuance noted. R hip with erythema and granulation tissue, no purulence  Musculoskeletal: NROM x4  Vascular: 2+ DP/PT pulses B/L  Neurologic: AAOx3; CNII-XII grossly intact; no focal deficits

## 2022-01-18 NOTE — ED PROVIDER NOTE - PHYSICAL EXAMINATION
CONSTITUTIONAL: obese female lying in bed NAD   HEAD: Normocephalic; atraumatic.   EYES: PERRL; EOM intact; conjunctiva and sclera clear  ENT: normal nose; no rhinorrhea; normal pharynx with no erythema or lesions.   NECK: Supple; non-tender; no LAD  CARDIOVASCULAR: Normal S1, S2; No audible murmurs. Regular rate and rhythm.   RESPIRATORY: Breathing easily; breath sounds clear and equal bilaterally; no wheezes, rhonchi, or rales.  GI: Soft; non-distended; non-tender; no palpable organomegaly.   MSK: FROM at all extremities, normal tone   EXT:  LLE- large ulcer on anterior shin extending from below knee  to ankle with slight surrounding erythema, weeping, +granulation tissue with blood, no foul smell, no purulent drainage, +excoriations   R hip- 10 x5 cm wound with granulation tissue with surrounding erythema and mild warmth   NEURO: A & O x 3; face symmetric; grossly unremarkable.   PSYCHOLOGICAL: The patient’s mood and manner are appropriate.

## 2022-01-18 NOTE — H&P ADULT - PROBLEM SELECTOR PLAN 3
-Hemoglobin of 6.9 on admission  -patient denies hx of blood in stool, states her stool has been dark secondary to PO iron. Denies hematochezia, hemoptysis, vaginal bleed, hematuria. Rectal exam on admission with no blood or dark stool, small amount of mucous  -patient had first iron infusion approximately one week ago. Reports a history of blood transfusions in the past, both at [facility] and [facility]  -recently started following with Dr. Watkins, Heme/Onc  -States she last had a colonoscopy 2 years ago, which was normal  -continue PO protonix 40 qd  -pending 1u pRBC  -maintain active Type and Screen -Hemoglobin of 6.9 on admission  -patient denies hx of blood in stool, states her stool has been dark secondary to PO iron. Denies hematochezia, hemoptysis, vaginal bleed, hematuria. Rectal exam on admission with no blood or dark stool, small amount of mucous  -patient had first iron infusion approximately one week ago. Reports a history of blood transfusions in the past, both at [facility] and [facility]  -recently started following with Dr. Watkins, Heme/Onc  -States she last had a colonoscopy 2 years ago, which was normal  -pending 1u pRBC  -maintain active Type and Screen -Hemoglobin of 6.9 on admission  -patient denies hx of blood in stool, states her stool has been dark secondary to PO iron. Denies hematochezia, hemoptysis, vaginal bleed, hematuria. Rectal exam on admission with no blood or dark stool, small amount of mucous  -patient had first iron infusion approximately one week ago. Reports a history of blood transfusions in the past, both at [facility] and [facility]  -recently started following with Dr. Watkins, Heme/Onc  -States she last had a colonoscopy 2 years ago, which was normal  -pending 1u pRBC  -maintain active Type and Screen  -repeat CBC after transfusion

## 2022-01-18 NOTE — ED PROVIDER NOTE - CLINICAL SUMMARY MEDICAL DECISION MAKING FREE TEXT BOX
71F with a h/o chronic cellulitis bipolar, excoriation disorder, HTN, preDM, mild aortic stenosis, hypothyroidism, hx of right hip surgery in 7/2021 c/b infection Afib, and DVT, seen again for leg pain on 1/4 labs at baseline presents to ED for draining from LLE x 2 days. No f/c. Also with fatigue and sob. LLE- large ulcer on anterior shin extending from below knee  to ankle with slight surrounding erythema, weeping, +granulation tissue with blood, no foul smell, no purulent drainage, +excoriations . R hip- 10 x5 cm wound with granulation tissue with surrounding erythema and mild warmth. LLE chronic appearing, low suspicion for acute infection. R hip wound with acute worsening erythema, will cover with abx. Will check labs, CXR.

## 2022-01-18 NOTE — H&P ADULT - PROBLEM SELECTOR PLAN 6
-Patient with history of provoked DVT after hip surgery in the past  -was previously on Eliquis, but was stopped in setting of anemia/bleeding  -no further AC at this time

## 2022-01-18 NOTE — H&P ADULT - PROBLEM SELECTOR PLAN 7
Per outpatient note by Dr. Wyatt ([facility]), Dr. Clarke of [facility] most recently performed echo in 8/21 demonstrating mild aortic stenosis and moderate LA dilation. Pt reports she no longer follows with Dr. Clarke of [facility] and now follows with Dr. Booker of [facility].    #Paroxysmal atrial fibrillation.   Pt with history of paroxysmal afib after hip arthroplasty on  7/16/21. Previously took Eliquis but discontinued 10/27 at [facility] in the setting of anemia and with unknown source.   - EKG NSR,  - Optimal anticoagulation prophylaxis and control to be determined outpatient, following anemia workup. -Per outpatient note by Dr. Wyatt, [place], Dr. Clarke of [facility] most recently performed echo in 8/21 demonstrating mild aortic stenosis and moderate LA dilation. Pt reports she no longer follows with Dr. Clarke of [facility] and now follows with Dr. Booker of [facility].    #Paroxysmal atrial fibrillation.   Pt with history of paroxysmal afib after hip arthroplasty on  7/16/21. Previously took Eliquis but discontinued secondary to anemia  -pending EKG

## 2022-01-18 NOTE — ED ADULT NURSE NOTE - OBJECTIVE STATEMENT
70 y/o female c/o left sheen wound, right hip wound, right upper arm wound with drainage, pt denies fever. As per pt, " I have been cleaning the wounds and putting bacitracin on it,"

## 2022-01-18 NOTE — H&P ADULT - HISTORY OF PRESENT ILLNESS
Patient is a 71 year old female with a pmhx of Bipolar, excoriation disorder, HTN, preDM (on Metformin), aortic stenosis, hypothyroidism (on synthroid and cytomel), hx of right hip replacement c/b infection, Afib, and DVT (previously on Eliquis but d/c'd i/s/o anemia), anemia (s/p blood transfusion at [facility] where she gets most of her care), chronic LLE cellulitis who presents today for evaluation of chronic LLE cellulitis, which she believes she noted pus from yesterday. Pt also notes a few day history of weakness, which prompted her visit to the ED today. Pt reports she has been consistently picking her wounds, both LLE and R hip. Pt then called [facility] approximately two weeks ago, who prescribed her Amoxicillin, for which she only took 2-3 days of given GI upset. Denies any fevers, chills, nausea, vomiting, headache, diarrhea, constipation. Pt states she has been anemic for years and had her first iron infusion last Monday. Pt just started following with Dr. Watkins (heme/onc) and last had a colonoscopy 2 years ago, which was normal. Patient states her stool has been dark as she takes PO iron at home.     In the ED  Vitals: T: 98.4, HR 94, /88, 18, 98% on RA  Labs: WBC 8.27, Hemoglobin 6.9 (7.4 on 1/4/22), INR 1.43, Cr .76  CXR:  Interventions: Tylenol, Cefazolin Patient is a 71 year old female with a pmhx of Bipolar, excoriation disorder, HTN, preDM (on Metformin), aortic stenosis, hypothyroidism (on synthroid and cytomel), hx of right hip replacement c/b infection, Afib, and DVT (previously on Eliquis but d/c'd i/s/o anemia), anemia (s/p blood transfusion at [facility] where she gets most of her care), chronic LLE cellulitis who presents today for evaluation of chronic LLE cellulitis, which she believes she noted pus from yesterday. Pt also notes a few day history of weakness, which prompted her visit to the ED today. Pt reports she has been consistently picking her wounds, both LLE and R hip. Pt then called [facility] approximately two weeks ago, who prescribed her Amoxicillin, for which she only took 2-3 days of given GI upset. Denies any fevers, chills, nausea, vomiting, headache, diarrhea, constipation. Pt states she has been anemic for years and had her first iron infusion last Monday. Pt just started following with Dr. Watkins (heme/onc) and last had a colonoscopy 2 years ago, which was normal. Patient states her stool has been dark as she takes PO iron at home.     In the ED  Vitals: T: 98.4, HR 94, /88, RR 18, 98% on RA  Labs: WBC 8.27, Hemoglobin 6.9 (7.4 on 1/4/22), INR 1.43, Cr .76  CXR: no acute infiltrates, similar to previous on 1/4/22  Interventions: Tylenol, Cefazolin

## 2022-01-18 NOTE — H&P ADULT - PROBLEM SELECTOR PLAN 4
-continue home cytomel and synthroid -continue home synthroid: 112mcg + 1/2 tab 25mcg -continue home synthroid: 112mcg + cytomel 12.5mg

## 2022-01-18 NOTE — ED PROVIDER NOTE - ATTENDING CONTRIBUTION TO CARE
71 F chronic LLE cellulitis admitted in Dec for similar now with LLE pain and drainage.  Improved with bacitracin since yesterday.  Had R hip replacement in July and complicated by infection.  Picks at all wounds.  Has hx of chronic anemia req blood tx in Dec.  No bloody or dark stool, no bleeding.  Prior hx of DVT and Afib not on AC.  Pt afebrile, obese, laying in nad, chronic erythema to R ankle to knee with granulation tissue, chronic skin change but no acute le infection.  R hip with wound erythema and warmth with mild ttp, concerning for infection.  Plan labs, abx and reassess.

## 2022-01-18 NOTE — H&P ADULT - PROBLEM SELECTOR PLAN 5
Patient diagnosed with pre-DM. On Metformin 500mg bid at home. HbA1C 4.2, wnl though may be falsely low in setting of anemia.  - mISS and carb consistent diet  - Resume home metformin 500mg BID on discharge Patient states she was diagnosed with pre-DM and uses Metformin 500mg bid at home  -HbA1C 4.2 on last admission, though may be falsely low in setting of anemia.  - mISS and carb consistent diet  - Resume home metformin 500mg BID on discharge Patient states she was diagnosed with pre-DM and uses Metformin 500mg bid at home  -HbA1C 4.2 on last admission, though may be falsely low in setting of anemia.  - mISS while inpatient  - Resume home metformin 500mg BID on discharge

## 2022-01-18 NOTE — H&P ADULT - NSHPREVIEWOFSYSTEMS_GEN_ALL_CORE
REVIEW OF SYSTEMS:    CONSTITUTIONAL: No fevers or chills  EYES/ENT: No visual changes;  No vertigo or throat pain   NECK: No pain or stiffness  RESPIRATORY: No cough, wheezing, hemoptysis; No shortness of breath  CARDIOVASCULAR: No chest pain or palpitations  GASTROINTESTINAL: No abdominal or epigastric pain. No nausea, vomiting, or hematemesis; No diarrhea or constipation. No melena or hematochezia.  GENITOURINARY: No dysuria, frequency or hematuria  NEUROLOGICAL: No numbness or weakness  SKIN: Chronic LLE rash and R hip rash

## 2022-01-18 NOTE — H&P ADULT - PROBLEM SELECTOR PLAN 9
- C/w home wellbutrin 300mg qd in AM, Topiramate 75mg qd, Seroquel 75mg qhs, Lithium 150mg qhs, Klonopin 3mg qhs, effexor - C/w home Topiramate 75mg qd, Seroquel 75mg qhs, Lithium 150mg qhs, Klonopin 3mg qhs, effexor - C/w home Topiramate 75mg qd, Seroquel 75mg qhs, Lithium 150mg qhs, Klonopin 3mg qhs, effexor, buproprion

## 2022-01-18 NOTE — ED ADULT NURSE NOTE - NSIMPLEMENTINTERV_GEN_ALL_ED
Implemented All Fall Risk Interventions:  [place] to call system. Call bell, personal items and telephone within reach. Instruct patient to call for assistance. Room bathroom lighting operational. Non-slip footwear when patient is off stretcher. Physically safe environment: no spills, clutter or unnecessary equipment. Stretcher in lowest position, wheels locked, appropriate side rails in place. Provide visual cue, wrist band, yellow gown, etc. Monitor gait and stability. Monitor for mental status changes and reorient to person, place, and time. Review medications for side effects contributing to fall risk. Reinforce activity limits and safety measures with patient and family.

## 2022-01-18 NOTE — H&P ADULT - ASSESSMENT
Patient is a 71 year old female with a pmhx of Bipolar, excoriation disorder, HTN, preDM (on Metformin), aortic stenosis, hypothyroidism (on synthroid and cytomel), hx of right hip replacement c/b infection, Afib, and DVT (previously on Eliquis but d/c'd i/s/o anemia), anemia (s/p blood transfusion at [facility] where she gets most of her care), chronic LLE cellulitis who presents today for evaluation of chronic LLE cellulitis, which she believes she noted pus from yesterday. Pt also notes a few day history of weakness. Pt now admitted for symptomatic anemia as well as management of LLE and R hip cellulitis.

## 2022-01-19 ENCOUNTER — TRANSCRIPTION ENCOUNTER (OUTPATIENT)
Age: 72
End: 2022-01-19

## 2022-01-19 DIAGNOSIS — D62 ACUTE POSTHEMORRHAGIC ANEMIA: ICD-10-CM

## 2022-01-19 DIAGNOSIS — K59.00 CONSTIPATION, UNSPECIFIED: ICD-10-CM

## 2022-01-19 LAB
A1C WITH ESTIMATED AVERAGE GLUCOSE RESULT: 4.8 % — SIGNIFICANT CHANGE UP (ref 4–5.6)
ANION GAP SERPL CALC-SCNC: 8 MMOL/L — SIGNIFICANT CHANGE UP (ref 5–17)
BASOPHILS # BLD AUTO: 0.03 K/UL — SIGNIFICANT CHANGE UP (ref 0–0.2)
BASOPHILS NFR BLD AUTO: 0.4 % — SIGNIFICANT CHANGE UP (ref 0–2)
BUN SERPL-MCNC: 7 MG/DL — SIGNIFICANT CHANGE UP (ref 7–23)
CALCIUM SERPL-MCNC: 8.4 MG/DL — SIGNIFICANT CHANGE UP (ref 8.4–10.5)
CHLORIDE SERPL-SCNC: 112 MMOL/L — HIGH (ref 96–108)
CO2 SERPL-SCNC: 22 MMOL/L — SIGNIFICANT CHANGE UP (ref 22–31)
CREAT SERPL-MCNC: 0.66 MG/DL — SIGNIFICANT CHANGE UP (ref 0.5–1.3)
EOSINOPHIL # BLD AUTO: 0.2 K/UL — SIGNIFICANT CHANGE UP (ref 0–0.5)
EOSINOPHIL NFR BLD AUTO: 2.6 % — SIGNIFICANT CHANGE UP (ref 0–6)
ESTIMATED AVERAGE GLUCOSE: 91 MG/DL — SIGNIFICANT CHANGE UP (ref 68–114)
GLUCOSE BLDC GLUCOMTR-MCNC: 101 MG/DL — HIGH (ref 70–99)
GLUCOSE BLDC GLUCOMTR-MCNC: 115 MG/DL — HIGH (ref 70–99)
GLUCOSE BLDC GLUCOMTR-MCNC: 155 MG/DL — HIGH (ref 70–99)
GLUCOSE BLDC GLUCOMTR-MCNC: 98 MG/DL — SIGNIFICANT CHANGE UP (ref 70–99)
GLUCOSE SERPL-MCNC: 108 MG/DL — HIGH (ref 70–99)
HCT VFR BLD CALC: 28.5 % — LOW (ref 34.5–45)
HGB BLD-MCNC: 7.8 G/DL — LOW (ref 11.5–15.5)
IMM GRANULOCYTES NFR BLD AUTO: 0.5 % — SIGNIFICANT CHANGE UP (ref 0–1.5)
LYMPHOCYTES # BLD AUTO: 1.78 K/UL — SIGNIFICANT CHANGE UP (ref 1–3.3)
LYMPHOCYTES # BLD AUTO: 22.9 % — SIGNIFICANT CHANGE UP (ref 13–44)
MAGNESIUM SERPL-MCNC: 2.4 MG/DL — SIGNIFICANT CHANGE UP (ref 1.6–2.6)
MCHC RBC-ENTMCNC: 24.3 PG — LOW (ref 27–34)
MCHC RBC-ENTMCNC: 27.4 GM/DL — LOW (ref 32–36)
MCV RBC AUTO: 88.8 FL — SIGNIFICANT CHANGE UP (ref 80–100)
MONOCYTES # BLD AUTO: 1.07 K/UL — HIGH (ref 0–0.9)
MONOCYTES NFR BLD AUTO: 13.8 % — SIGNIFICANT CHANGE UP (ref 2–14)
NEUTROPHILS # BLD AUTO: 4.64 K/UL — SIGNIFICANT CHANGE UP (ref 1.8–7.4)
NEUTROPHILS NFR BLD AUTO: 59.8 % — SIGNIFICANT CHANGE UP (ref 43–77)
NRBC # BLD: 0 /100 WBCS — SIGNIFICANT CHANGE UP (ref 0–0)
PHOSPHATE SERPL-MCNC: 2.2 MG/DL — LOW (ref 2.5–4.5)
PLATELET # BLD AUTO: 482 K/UL — HIGH (ref 150–400)
POTASSIUM SERPL-MCNC: 3.6 MMOL/L — SIGNIFICANT CHANGE UP (ref 3.5–5.3)
POTASSIUM SERPL-SCNC: 3.6 MMOL/L — SIGNIFICANT CHANGE UP (ref 3.5–5.3)
RBC # BLD: 3.21 M/UL — LOW (ref 3.8–5.2)
RBC # FLD: 15.2 % — HIGH (ref 10.3–14.5)
SODIUM SERPL-SCNC: 142 MMOL/L — SIGNIFICANT CHANGE UP (ref 135–145)
WBC # BLD: 7.76 K/UL — SIGNIFICANT CHANGE UP (ref 3.8–10.5)
WBC # FLD AUTO: 7.76 K/UL — SIGNIFICANT CHANGE UP (ref 3.8–10.5)

## 2022-01-19 PROCEDURE — 99232 SBSQ HOSP IP/OBS MODERATE 35: CPT | Mod: GC

## 2022-01-19 PROCEDURE — 99223 1ST HOSP IP/OBS HIGH 75: CPT

## 2022-01-19 RX ORDER — SENNA PLUS 8.6 MG/1
2 TABLET ORAL AT BEDTIME
Refills: 0 | Status: DISCONTINUED | OUTPATIENT
Start: 2022-01-19 | End: 2022-01-21

## 2022-01-19 RX ORDER — VANCOMYCIN HCL 1 G
1250 VIAL (EA) INTRAVENOUS EVERY 12 HOURS
Refills: 0 | Status: COMPLETED | OUTPATIENT
Start: 2022-01-19 | End: 2022-01-20

## 2022-01-19 RX ORDER — POLYETHYLENE GLYCOL 3350 17 G/17G
17 POWDER, FOR SOLUTION ORAL DAILY
Refills: 0 | Status: DISCONTINUED | OUTPATIENT
Start: 2022-01-19 | End: 2022-01-21

## 2022-01-19 RX ORDER — NYSTATIN CREAM 100000 [USP'U]/G
1 CREAM TOPICAL
Refills: 0 | Status: DISCONTINUED | OUTPATIENT
Start: 2022-01-19 | End: 2022-01-21

## 2022-01-19 RX ORDER — LEVOTHYROXINE SODIUM 125 MCG
0.5 TABLET ORAL
Qty: 0 | Refills: 0 | DISCHARGE

## 2022-01-19 RX ADMIN — LITHIUM CARBONATE 150 MILLIGRAM(S): 300 TABLET, EXTENDED RELEASE ORAL at 19:00

## 2022-01-19 RX ADMIN — QUETIAPINE FUMARATE 75 MILLIGRAM(S): 200 TABLET, FILM COATED ORAL at 21:42

## 2022-01-19 RX ADMIN — Medication 1 APPLICATION(S): at 05:16

## 2022-01-19 RX ADMIN — Medication 100 MILLIGRAM(S): at 05:16

## 2022-01-19 RX ADMIN — BUPROPION HYDROCHLORIDE 300 MILLIGRAM(S): 150 TABLET, EXTENDED RELEASE ORAL at 07:27

## 2022-01-19 RX ADMIN — SENNA PLUS 2 TABLET(S): 8.6 TABLET ORAL at 21:41

## 2022-01-19 RX ADMIN — Medication 75 MILLIGRAM(S): at 19:00

## 2022-01-19 RX ADMIN — Medication 112 MICROGRAM(S): at 05:16

## 2022-01-19 RX ADMIN — LIOTHYRONINE SODIUM 12.5 MICROGRAM(S): 25 TABLET ORAL at 05:23

## 2022-01-19 RX ADMIN — Medication 1 APPLICATION(S): at 14:16

## 2022-01-19 RX ADMIN — POLYETHYLENE GLYCOL 3350 17 GRAM(S): 17 POWDER, FOR SOLUTION ORAL at 13:06

## 2022-01-19 RX ADMIN — Medication 100 MILLIGRAM(S): at 21:42

## 2022-01-19 RX ADMIN — Medication 166.67 MILLIGRAM(S): at 14:53

## 2022-01-19 RX ADMIN — NYSTATIN CREAM 1 APPLICATION(S): 100000 CREAM TOPICAL at 19:01

## 2022-01-19 RX ADMIN — Medication 1: at 13:05

## 2022-01-19 RX ADMIN — Medication 300 MILLIGRAM(S): at 05:16

## 2022-01-19 RX ADMIN — Medication 100 MILLIGRAM(S): at 14:17

## 2022-01-19 RX ADMIN — Medication 1 APPLICATION(S): at 06:00

## 2022-01-19 NOTE — BH CONSULTATION LIAISON ASSESSMENT NOTE - SUMMARY
Pt is a 70 yo  single female, living alone in apartment with a private HHA 20hrs/week, with PPH "agitated depression" and excoriation disorder, no prior SA's or psychiatric hospitalizations, PMH of HTN, pre-DM, aortic stenosis, hypothyroidism, Afib, DVT and hx of right knee replacement, bilateral hip replacements with infection and repeat surgery for the left hip this past summer, admitted for anemia and cellulitis of the lower extremeties. Pt reports life long hx of anxiety and “agitated depression", poor response to multiple medication trials. Pt reports exacerbation of depressive symptoms and excoriation d/o symptoms since the death of her mother, futher worsening over the past three years in setting of multiple medical problems. Pt is currently denying SI/plan or intent. Despite of experiencing ongoing mood disorder symptoms pt declines suggested changes to her med regiment. As per primary team outpatient psychiatrist would like to keep current medication regiment as is. Psychoeducation was provided to pt about side effects and potential interactions of medications and alcohol. Pt denies using alcohol recently.  Pt denied SI at this time. She is future oriented and plans to follow up with her outpatient psychiatrist and  for further psychiatric care. There is no indication of acute risk to self at this time.

## 2022-01-19 NOTE — DISCHARGE NOTE PROVIDER - NSDCFUADDAPPT_GEN_ALL_CORE_FT
1. You have a follow-up appointment with your vascular surgeon Dr. Selena Pearce on Tuesday 1/25 at 9:45 am.    2. You have a follow-up appointment with your hematologist Dr. Anamika Watkins on Monday 1/24 at 11:30 am for an IV iron infusion.    3. You also have a follow-up appointment to see Dr. Watkins on Wednesday 1/26 at 1:30PM.    4. You have a follow-up appointment with your primary care provider /cardiologist Dr. Booker on Thursday 2/3 at 3:30 PM.  1. You have a follow-up appointment with your vascular surgeon Dr. Selena Pearce on Tuesday 1/25 at 9:45 am.    2. You have a follow-up appointment with your hematologist Dr. Anamika Watkins on Monday 1/24 at 11:30 am for an IV iron infusion.    3. You also have a follow-up appointment to see Dr. Anamika Watkins on Wednesday 1/26 at 1:30PM.    4. You have a follow-up appointment with your primary care provider /cardiologist Dr. Booker on Thursday 2/3 at 3:30 PM.     ****expecting call back from [facility]'s wound care center to set up appointment **** 1. You have a follow-up appointment with your vascular surgeon Dr. Selena Pearce on Tuesday 1/25 at 9:45 am.    2. You have a follow-up appointment with your hematologist Dr. Anamika Watkins on Monday 1/24 at 11:30 am for an IV iron infusion.    3. You also have a follow-up appointment to see Dr. Anamika Watkins on Wednesday 1/26 at 1:30PM.    4. You have a follow-up appointment with your primary care provider /cardiologist Dr. Booker on Thursday 2/3 at 3:30 PM.     ***Left VM with Psychiatrist Dr. Moscoso to schedule outpatient followup***  ****expecting call back from [facility]'s wound care center to set up appointment **** 1. You have a follow-up appointment with your vascular surgeon Dr. Selena Pearce on Tuesday 1/25 at 9:45 am.    2. You have a follow-up appointment with your hematologist Dr. Anamika Watkins on Monday 1/24 at 11:30 am for an IV iron infusion.    3. You also have a follow-up appointment to see Dr. Anamika Watkins on Wednesday 1/26 at 1:30PM.    4. You have a follow-up appointment with your primary care provider /cardiologist Dr. Booker on Thursday 2/3 at 3:30 PM.     ***Left VM with Psychiatrist Dr. Moscoso to schedule outpatient followup***  ***LVM w/ answering service. [facility]'s wound care center to set up appointment ****  ***Memo Eller  left VM with answering service **** 1. You have a follow-up appointment with your vascular surgeon Dr. Selena Pearce on Tuesday 1/25 at 9:45 am.    2. You have a follow-up appointment with your hematologist Dr. Anamika Watkins on Monday 1/24 at 11:30 am for an IV iron infusion.    3. You also have a follow-up appointment to see Dr. Anamika Watkins on Wednesday 1/26 at 1:30PM.    4. You have a follow-up appointment with your primary care provider /cardiologist Dr. Booker on Thursday 2/3 at 3:30 PM.     ***Left VM with Psychiatrist Dr. Moscoso to schedule outpatient followup***  ***LVM w/ answering service. [facility]'s wound care center to set up appointment ****  ***Memo Eller  left VM with answering service ****    5. Please follow-up with Dr. Nieto to establish care (psychiatry) on:      Vascular:  1. You have a follow-up appointment with your vascular surgeon Dr. Selena Pearce on Tuesday 1/25 at 9:45 am.  We have reached out to several wound clinics to establish care. Please look out for any phone calls for upcoming appointments.     Hematology:  2. You have a follow-up appointment with your hematologist Dr. Anamika Watkins on Monday 1/24 at 11:30 am for an IV iron infusion.    3. You also have a follow-up appointment to see Dr. Anamika Watkins on Wednesday 1/26 at 1:30PM.    Cardiology:  4. You have a follow-up appointment with your cardiologist Dr. Booker on Thursday 2/3 at 3:30 PM.     Primary Care:  5. You have a follow-up appointment with a primary care physician, Dr. Larsen on Thursday 2/10 at 1:20 PM.     Psychiatrist:  We recommend that you schedule an appointment with Dr. Moscoso because it is important to follow-up with doctors who are familiar with you and can continue to prescribe your medications. There are currently long wait-lists to see psychiatrists and it is important that you have care in the meantime while looking for a new psychiatrists.   We have a few recommendations to establish new care.   For psychiatrists and therapists that participate with insurance, please contact the [facility]  at  ext [phone].   You can also contact Dr. Sweeney to establish care .    Vascular:  1. You have a follow-up appointment with your vascular surgeon Dr. Selena Pearce on Tuesday 1/25 at 9:45 am. We have reached out to several wound clinics to establish care. Please look out for any phone calls for upcoming appointments.     Hematology:  2. You have a follow-up appointment with your hematologist Dr. Anamika Watkins on Monday 1/24 at 11:30 am for an IV iron infusion. You also have a follow-up appointment to see Dr. Anamika Watkins on Wednesday 1/26 at 1:30PM.    Cardiology:  4. You have a follow-up appointment with your cardiologist Dr. Booker on Thursday 2/3 at 3:30 PM.     Primary Care:  5. You have a follow-up appointment with a new primary care physician, Dr. Larsen on Thursday 2/10 at 1:20 PM.     Psychiatrist:  We recommend that you schedule an appointment with Dr. Moscoso because it is important to follow-up with doctors who are familiar with you and can continue to prescribe your medications. There are currently long wait-lists to see psychiatrists and it is important that you have care in the meantime while looking for a new psychiatrists.   We have a few recommendations to establish new care. For psychiatrists and therapists that participate with insurance, please contact the [facility]  at  ext [phone]. You can also contact Dr. Sweeney to establish care .

## 2022-01-19 NOTE — PROGRESS NOTE ADULT - PROBLEM SELECTOR PLAN 7
-Per outpatient note by Dr. Wyatt, [place], Dr. Clarke of [facility] most recently performed echo in 8/21 demonstrating mild aortic stenosis and moderate LA dilation. Pt reports she no longer follows with Dr. Clarke of [facility] and now follows with Dr. Booker of [facility].    #Paroxysmal atrial fibrillation.   Pt with history of paroxysmal afib after hip arthroplasty on  7/16/21. Previously took Eliquis but discontinued secondary to anemia  -pending EKG -Patient with history of provoked DVT after hip surgery in the past  - consider LE dopplers  -was previously on Eliquis, but was stopped in setting of anemia/bleeding  -no further AC at this time Patient states she was diagnosed with pre-DM and uses Metformin 500mg bid at home  -HbA1C 4.2 on last admission, though may be falsely low in setting of anemia.  - mISS while inpatient  - Resume home metformin 500mg BID on discharge

## 2022-01-19 NOTE — DISCHARGE NOTE PROVIDER - NSDCFUSCHEDAPPT_GEN_ALL_CORE_FT
RAFA DONAHUE ; 02/15/2022 ; [facility] [address]  RAFA DONAHUE ; 02/18/2022 ; [facility] [address] RAFA DONAHUE ; 02/03/2022 ; [facility] [address]  RAFA DONAHUE ; 02/15/2022 ; [facility] [address]  RAFA DONAHUE ; 02/18/2022 ; [facility] [address] RAFA DONAHUE ; 02/03/2022 ; [facility] [address]  DONAHUERAFA ; 02/10/2022 ; [facility] [address]   RAFA DONAHUE ; 02/15/2022 ; [facility] [address]   RAFA DONAHUE ; 02/18/2022 ; [facility] [address]
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
## 2022-01-19 NOTE — PROGRESS NOTE ADULT - PROBLEM SELECTOR PLAN 1
-patient with chronic LLE cellulitis, which she states has been ongoing for the past few years  -patient last admitted to [facility] 12/2021 and received Vancomycin during admission, discharged with 2 week course of Bactrim  -patient states she has been picking at her skin and believes she noted some pus yesterday, which prompted her visit to the ED today  -per previous notes, patient has followed with Dr. Selena Pearce of [facility] vascular surgery  -two weeks ago, patient called [facility], who prescribed her Amoxicillin, which she took 2-3 days of, but stopped due to GI upset (did not complete course)  -in the ED, patient afebrile with no leukocytosis  -on physical exam, LLE with erythema from foot up to knee, with overlying granulation tissue and dried blood. No purulence or fluctuance noted on exam  -s/p Cefazolin in the ED  -continue with Cefazolin 1000mg q8h for 5 days  -f/u wound care recommendations Patient admits to 10+ year history of LLE cellulitis in setting of excoriation disorder. Had multiple admissions and ED visits for cellulitis (11/16/21 at [facility] tx w/ keflexI; 12/1-3/21 at [facility] tx w/ vancomycin; 1/4/22 [facility] ED visit.) Two weeks ago, [facility] prescribed amoxicillin which pt only took 2-3 days of due to GI upset. Prior chart reivew, Dr. Selena Pearce of [facility] vascular surgery follows pt. and notes that pt. scratches less when wearing an Unna boot. Started on cefazolin in ED  -  -Appreciate wound care recommendations  -patient last admitted to [facility] 12/2021 and received Vancomycin during admission, discharged with 2 week course of Bactrim  -patient states she has been picking at her skin and believes she noted some pus yesterday, which prompted her visit to the ED today  -per previous notes, patient has followed with Dr. Selena Pearce of [facility] vascular surgery  -two weeks ago, patient called [employer] [facility], who prescribed her Amoxicillin, which she took 2-3 days of, but stopped due to GI upset (did not complete course)  -in the ED, patient afebrile with no leukocytosis  -on physical exam, LLE with erythema from foot up to knee, with overlying granulation tissue and dried blood. No purulence or fluctuance noted on exam  -s/p Cefazolin in the ED  -continue with Cefazolin 1000mg q8h for 5 days  -f/u wound care recommendations Patient admits to 10+ year history of LLE cellulitis in setting of excoriation disorder. Had multiple admissions and ED visits for cellulitis (11/16/21 at [facility] tx w/ keflex; 12/1-3/21 at [facility] tx w/ vancomycin; 1/4/22 [facility] ED visit.) Two weeks ago, [facility] prescribed amoxicillin which pt only took 2-3 days of due to GI upset. Per chart review, Dr. Selena Pearce of [facility] vascular surgery follows pt. and notes that pt. scratches less when wearing an Unna boot. Started on cefazolin in ED. Dressings undone and purulence noted on exam of LLE. Borders were marked today at 12:00pm. Cefazolin 1000mg q8h for 5 days    -start vancomycin  -Appreciate wound care recommendations Patient admits to 10+ year history of LLE cellulitis in setting of excoriation disorder. Had multiple admissions and ED visits for cellulitis (11/16/21 at [facility] tx w/ keflex; 12/1-3/21 at [facility] tx w/ vancomycin; 1/4/22 [facility] ED visit.) Two [facility] ago, [facility] prescribed amoxicillin which pt only took 2-3 days of due to GI upset. Per chart review, Dr. Selena Pearce of [facility] vascular surgery follows pt. and notes that pt. scratches less when wearing an Unna boot. Started on cefazolin in ED. Dressings undone and purulence noted on exam of LLE. Borders were marked today at 1:00pm. Cefazolin 1000mg q8h for 5 days (1/18-) and vancomycin.     -start vancomycin  -Appreciate wound care recommendations

## 2022-01-19 NOTE — PHYSICAL THERAPY INITIAL EVALUATION ADULT - PERTINENT HX OF CURRENT PROBLEM, REHAB EVAL
Pt. is a 71 y.o female presenting with weakness, worsening acute on chronic LLE cellulitis with reported purulent discharge. Admitted for further management of LE cellulitis and concurrent finding of anemia, now s/p transfusion  of 1 Unit PRBC. .

## 2022-01-19 NOTE — ADVANCED PRACTICE NURSE CONSULT - ASSESSMENT
Patient is a 71 year old female with a pmhx of Bipolar, excoriation disorder, HTN, preDM (on Metformin), aortic stenosis, hypothyroidism (on synthroid and cytomel), hx of right hip replacement c/b infection, Afib, and DVT (previously on Eliquis but d/c'd i/s/o anemia), anemia (s/p blood transfusion at [facility] where she gets most of her care), chronic LLE cellulitis who presents today for evaluation of chronic LLE cellulitis, which she believes she noted pus from yesterday. Pt also notes a few day history of weakness. Pt now admitted for symptomatic anemia as well as management of LLE and R hip cellulitis. Wound to right hip with periwound fungal infection, wound bed dark pink, slightly moist, crusting at edges, no s/s of infection noted. Pt c/o itching, suggested using ice pack to help with itching, Nystatin powder to be ordered for area. Left shin with large superficial wound, per patient, caused by picking at skin. Periwound erythema, scabbing at edges, dark red periwound, no odor, minimal drainage.

## 2022-01-19 NOTE — DISCHARGE NOTE PROVIDER - NSDCCPCAREPLAN_GEN_ALL_CORE_FT
PRINCIPAL DISCHARGE DIAGNOSIS  Diagnosis: Cellulitis of skin  Assessment and Plan of Treatment: Cellulitis is an infection of the skin that can cause redness, pain, and swelling.  Cellulitis can happen when germs get into the skin. Normally, different types of germs live on a person's skin and if a person gets a cut or a break in the skin, the germs can cause an infection. Some people with cellulitis can sometimes also have fever or chills.  Cellulitis is treated with antibiotics. In the hospital, you received antibiotics through an IV line. You were given Cefalexin and Vancomycin. It is important that you continue to take oral antibiotics when you leave the hospital to prevent the cellulitis from spreading or getting worse.   Home medications:  1. You were prescribed an antibiotic called cephalexin 500 mg. Please take it 4 times a day for 7 days.   2. You were prescribed another antibiotic called doxycycline 150 mg. Please take it 2 times a day for 8 days.  It is important to take care of your wounds and try your best not to pick at them, because it can make your infection worse. We set you up with a visiting nurse service to help you change your dressings. Please follow up with your vascular surgeon Dr. Pearce. Additionally, we have tried to arrange care at a wound clinic for you so please look out for any phone calls.   If your cellulitis gets bigger, more swollen, or more painful, then pelase call your doctor.      SECONDARY DISCHARGE DIAGNOSES  Diagnosis: Anemia  Assessment and Plan of Treatment: Anemia is the medical term for when a person has too few red blood cells. Red blood cells are the cells in your blood that carry oxygen. If you have too few red blood cells, your body does not get all the oxygen it needs. Most people with anemia have no symptoms. They find out they have it after their doctor does blood tests for another reason. People who do have symptoms might feel tired or weak, especially if they try to exercise or have headaches.   Medications:  1. Please continue to take your iron supplements, as prescribed by your physician. If you feel constipated, please take Miralax and Senna.   Follow-up appointments:  Please continue to see Dr. Watkins for your iron infusions.  You have a follow-up appointment with your hematologist Dr. Anamika Watkins on Monday 1/24 at 11:30 am for an IV iron infusion.  You also have a follow-up appointment to see Dr. Anamika Watkins on Wednesday 1/26 at 1:30PM.     PRINCIPAL DISCHARGE DIAGNOSIS  Diagnosis: Cellulitis of skin  Assessment and Plan of Treatment: Cellulitis is an infection of the skin that can cause redness, pain, and swelling.  Cellulitis can happen when germs get into the skin. Normally, different types of germs live on a person's skin and if a person gets a cut or a break in the skin, the germs can cause an infection. Some people with cellulitis can sometimes also have fever or chills.  Cellulitis is treated with antibiotics. In the hospital, you received antibiotics through an IV line. You were given Cefalexin and Vancomycin. It is important that you continue to take oral antibiotics when you leave the hospital to prevent the cellulitis from spreading or getting worse.   Home medications:  1. You were prescribed an antibiotic called cephalexin 500 mg PO. Please take it 4 times a day for 7 more days.   2. You were prescribed another antibiotic called doxycycline 100 mg PO. Please take it 2 times a day for 8 more days.   It is important to take care of your wounds and try your best not to pick at them, because it can make your infection worse. We set you up with a visiting nurse service to help you change your dressings. Please follow up with your vascular surgeon Dr. Pearce. Additionally, we have tried to arrange care at a wound clinic for you so please look out for any phone calls.   If your cellulitis gets bigger, more swollen, or more painful, then please call your doctor and seek a medical evaluation.      SECONDARY DISCHARGE DIAGNOSES  Diagnosis: Anemia  Assessment and Plan of Treatment: Anemia is the medical term for when a person has too few red blood cells. Red blood cells are the cells in your blood that carry oxygen. If you have too few red blood cells, your body does not get all the oxygen it needs. Most people with anemia have no symptoms. They find out they have it after their doctor does blood tests for another reason. People who do have symptoms might feel tired or weak, especially if they try to exercise or have headaches.   Medications:  1. Please continue to take your iron supplements, as prescribed by your physician. If you feel constipated, please take Miralax and Senna.   Follow-up appointments:  Please continue to see Dr. Watkins for your iron infusions.  You have a follow-up appointment with your hematologist Dr. Anamika Watkins on Monday 1/24 at 11:30 am for an IV iron infusion.  You also have a follow-up appointment to see Dr. Anamika Watkins on Wednesday 1/26 at 1:30PM.    Diagnosis: Psychiatric illness  Assessment and Plan of Treatment: You have a history of psychiatric illness that causes you to pick at your skin. Please see your out-patient psychiatrist and continue your home medications. Please follow-up with the [facility] if you would like to see a new psychiatrist or call Dr. Nieto's office to establish care.

## 2022-01-19 NOTE — PROGRESS NOTE ADULT - PROBLEM SELECTOR PLAN 3
-Hemoglobin of 6.9 on admission  -patient denies hx of blood in stool, states her stool has been dark secondary to PO iron. Denies hematochezia, hemoptysis, vaginal bleed, hematuria. Rectal exam on admission with no blood or dark stool, small amount of mucous  -patient had first iron infusion approximately one week ago. Reports a history of blood transfusions in the past, both at [facility] and [facility]  -recently started following with Dr. Watkins, Heme/Onc  -States she last had a colonoscopy 2 years ago, which was normal  -pending 1u pRBC  -maintain active Type and Screen  -repeat CBC after transfusion -Hemoglobin of 6.9 on admission s/p 1 unit of pRBCs on 1/18 in PM with improvement of Hb to 7.8. Pt recently started seeing heme/onc Dr. Watkins and Is s/p iron infusion x1 week. Taking iron supplements. Reports a history of blood transfusions in the past, both at [facility] and [facility]    -f/u AM CBC  -maintain active Type and Screen  -f/u outpatient with Dr. Watkins

## 2022-01-19 NOTE — PROGRESS NOTE ADULT - SUBJECTIVE AND OBJECTIVE BOX
**INCOMPLETE NOTE    OVERNIGHT EVENTS:    SUBJECTIVE:  Patient seen and examined at bedside.    Vital Signs Last 12 Hrs  T(F): 97.5 (01-19-22 @ 05:23), Max: 98.9 (01-18-22 @ 20:45)  HR: 53 (01-19-22 @ 05:23) (53 - 86)  BP: 139/79 (01-19-22 @ 05:23) (122/69 - 139/79)  BP(mean): --  RR: 18 (01-19-22 @ 05:23) (17 - 18)  SpO2: 100% (01-19-22 @ 05:23) (96% - 100%)  I&O's Summary    18 Jan 2022 07:01  -  19 Jan 2022 07:00  --------------------------------------------------------  IN: 0 mL / OUT: 1500 mL / NET: -1500 mL        PHYSICAL EXAM:  Constitutional: NAD, comfortable in bed.  HEENT: NC/AT, PERRLA, EOMI, no conjunctival pallor or scleral icterus, MMM  Neck: Supple, no JVD  Respiratory: CTA B/L. No w/r/r.   Cardiovascular: RRR, normal S1 and S2, no m/r/g.   Gastrointestinal: +BS, soft NTND, no guarding or rebound tenderness, no palpable masses   Extremities: wwp; no cyanosis, clubbing or edema.   Vascular: Pulses equal and strong throughout.   Neurological: AAOx3, no CN deficits, strength and sensation intact throughout.   Skin: No gross skin abnormalities or rashes        LABS:                        7.8    7.76  )-----------( 482      ( 19 Jan 2022 06:52 )             28.5     01-19    142  |  112<H>  |  7   ----------------------------<  108<H>  3.6   |  22  |  0.66    Ca    8.4      19 Jan 2022 06:50  Phos  2.2     01-19  Mg     2.4     01-19    TPro  6.6  /  Alb  3.6  /  TBili  0.2  /  DBili  x   /  AST  12  /  ALT  7<L>  /  AlkPhos  94  01-18    PT/INR - ( 18 Jan 2022 11:46 )   PT: 16.9 sec;   INR: 1.43          PTT - ( 18 Jan 2022 11:46 )  PTT:34.3 sec        RADIOLOGY & ADDITIONAL TESTS:    MEDICATIONS  (STANDING):  BACItracin   Ointment 1 Application(s) Topical three times a day  buPROPion XL (24-Hour) . 300 milliGRAM(s) Oral every 24 hours  ceFAZolin   IVPB 1000 milliGRAM(s) IV Intermittent every 8 hours  dextrose 40% Gel 15 Gram(s) Oral once  dextrose 5%. 1000 milliLiter(s) (50 mL/Hr) IV Continuous <Continuous>  dextrose 5%. 1000 milliLiter(s) (100 mL/Hr) IV Continuous <Continuous>  dextrose 50% Injectable 25 Gram(s) IV Push once  dextrose 50% Injectable 12.5 Gram(s) IV Push once  dextrose 50% Injectable 25 Gram(s) IV Push once  glucagon  Injectable 1 milliGRAM(s) IntraMuscular once  insulin lispro (ADMELOG) corrective regimen sliding scale   SubCutaneous Before meals and at bedtime  levothyroxine 112 MICROGram(s) Oral every 24 hours  liothyronine 12.5 MICROGram(s) Oral every 24 hours  lithium 150 milliGRAM(s) Oral every 24 hours  QUEtiapine 75 milliGRAM(s) Oral at bedtime  topiramate 75 milliGRAM(s) Oral every 24 hours  venlafaxine XR. 300 milliGRAM(s) Oral every 24 hours    MEDICATIONS  (PRN):  clonazePAM  Tablet 3 milliGRAM(s) Oral at bedtime PRN anxiety   **INCOMPLETE NOTE    OVERNIGHT EVENTS: Received 1 unit of pRBCs for Hb of 6.9, up to 7.8 this AM. Patient continued to scratch and pick at LLE. Leg was covered in bacitracin and wrapped in gauze to prevent further excoriation.     SUBJECTIVE:  Patient seen and examined at bedside. Patient was somnolent and required frequent awakening and redirection during interview. She is oriented to self and place and understands she is here for her scratching and anemia. She has no acute complaints and denies any pain, drainage, or purulence from the wounds on the left lower extremity.     Vital Signs Last 12 Hrs  T(F): 97.5 (01-19-22 @ 05:23), Max: 98.9 (01-18-22 @ 20:45)  HR: 53 (01-19-22 @ 05:23) (53 - 86)  BP: 139/79 (01-19-22 @ 05:23) (122/69 - 139/79)  BP(mean): --  RR: 18 (01-19-22 @ 05:23) (17 - 18)  SpO2: 100% (01-19-22 @ 05:23) (96% - 100%)  I&O's Summary    18 Jan 2022 07:01  -  19 Jan 2022 07:00  --------------------------------------------------------  IN: 0 mL / OUT: 1500 mL / NET: -1500 mL        PHYSICAL EXAM:  Constitutional: somnolent and obese, resting in bed.  HEENT: excoriation with scabbing present on forehead, PERRLA, EOMI, no conjunctival pallor or scleral icterus, MMM. Poor dentition.   Neck: Supple, no JVD  Respiratory: CTA B/L. No w/r/r.   Cardiovascular: RRR, S1 and S2. Appreciated grade III/IV holosystolic murmur on auscultation.  Gastrointestinal: +BS, soft NTND, no guarding or rebound tenderness.. Excoriation with induration and erythema present on the abdomen.   Extremities: wwp; no cyanosis, clubbing or edema. Stasis dermatitis on the medial aspect of the right lower extremity, over medial malleolus.   Vascular: Pulses equal and strong throughout.   Neurological: AAOx2.  Skin: Several areas of excoriation and erythema noted on physical exam. Excoriation with scabbing on forehead. Excoriation with induration on abdomen. Stasis dermatitis on the medial aspect of the right lower extremity, over medial malleolus. Erythema extending from L foot to L ankle with multiple excoriations, wrapped in gauze dressing, not actively draining; dried brown blood, no fluctuance or purulence on LLE. Right hip - erythematous and excoriated scar on anterolateral thigh, with dressing noted to have purulent drainage.        LABS:                        7.8    7.76  )-----------( 482      ( 19 Jan 2022 06:52 )             28.5     01-19    142  |  112<H>  |  7   ----------------------------<  108<H>  3.6   |  22  |  0.66    Ca    8.4      19 Jan 2022 06:50  Phos  2.2     01-19  Mg     2.4     01-19    TPro  6.6  /  Alb  3.6  /  TBili  0.2  /  DBili  x   /  AST  12  /  ALT  7<L>  /  AlkPhos  94  01-18    PT/INR - ( 18 Jan 2022 11:46 )   PT: 16.9 sec;   INR: 1.43          PTT - ( 18 Jan 2022 11:46 )  PTT:34.3 sec        RADIOLOGY & ADDITIONAL TESTS:    MEDICATIONS  (STANDING):  BACItracin   Ointment 1 Application(s) Topical three times a day  buPROPion XL (24-Hour) . 300 milliGRAM(s) Oral every 24 hours  ceFAZolin   IVPB 1000 milliGRAM(s) IV Intermittent every 8 hours  dextrose 40% Gel 15 Gram(s) Oral once  dextrose 5%. 1000 milliLiter(s) (50 mL/Hr) IV Continuous <Continuous>  dextrose 5%. 1000 milliLiter(s) (100 mL/Hr) IV Continuous <Continuous>  dextrose 50% Injectable 25 Gram(s) IV Push once  dextrose 50% Injectable 12.5 Gram(s) IV Push once  dextrose 50% Injectable 25 Gram(s) IV Push once  glucagon  Injectable 1 milliGRAM(s) IntraMuscular once  insulin lispro (ADMELOG) corrective regimen sliding scale   SubCutaneous Before meals and at bedtime  levothyroxine 112 MICROGram(s) Oral every 24 hours  liothyronine 12.5 MICROGram(s) Oral every 24 hours  lithium 150 milliGRAM(s) Oral every 24 hours  QUEtiapine 75 milliGRAM(s) Oral at bedtime  topiramate 75 milliGRAM(s) Oral every 24 hours  venlafaxine XR. 300 milliGRAM(s) Oral every 24 hours    MEDICATIONS  (PRN):  clonazePAM  Tablet 3 milliGRAM(s) Oral at bedtime PRN anxiety   **INCOMPLETE NOTE    OVERNIGHT EVENTS: Received 1 unit of pRBCs for Hb of 6.9, up to 7.8 this AM. Patient continued to scratch and pick at left leg, so the leg was covered in bacitracin and wrapped in gauze to prevent further excoriation.     SUBJECTIVE:  Patient seen and examined at bedside. Patient was somnolent and required frequent awakening and redirection during interview. She is oriented to self and place and understands she is here for her scratching and anemia. She has no acute complaints and denies any pain, drainage, or purulence from the wounds on the left lower extremity.     Vital Signs Last 12 Hrs  T(F): 97.5 (01-19-22 @ 05:23), Max: 98.9 (01-18-22 @ 20:45)  HR: 53 (01-19-22 @ 05:23) (53 - 86)  BP: 139/79 (01-19-22 @ 05:23) (122/69 - 139/79)  BP(mean): --  RR: 18 (01-19-22 @ 05:23) (17 - 18)  SpO2: 100% (01-19-22 @ 05:23) (96% - 100%)  I&O's Summary    18 Jan 2022 07:01  -  19 Jan 2022 07:00  --------------------------------------------------------  IN: 0 mL / OUT: 1500 mL / NET: -1500 mL        PHYSICAL EXAM:  Constitutional: somnolent and obese, resting in bed.  HEENT: excoriation with scabbing present on forehead, PERRLA, EOMI, no conjunctival pallor or scleral icterus, MMM. Poor dentition.   Neck: Supple, no JVD  Respiratory: CTA B/L. No w/r/r.   Cardiovascular: RRR, S1 and S2. Appreciated grade III/IV holosystolic murmur on auscultation.  Gastrointestinal: +BS, soft NTND, no guarding or rebound tenderness.. Excoriation with induration and erythema present on the abdomen.   Extremities: wwp; no cyanosis, clubbing or edema. Stasis dermatitis on the medial aspect of the right lower extremity, over medial malleolus.   Vascular: Pulses equal and strong throughout.   Neurological: AAOx2.  Skin: Several areas of excoriation and erythema noted on physical exam. Excoriation with scabbing on forehead. Excoriation with induration on abdomen. Stasis dermatitis on the medial aspect of the right lower extremity, over medial malleolus. Erythema extending from L foot to L ankle with multiple excoriations, wrapped in gauze dressing, not actively draining; dried brown blood, no fluctuance or purulence on LLE. Right hip - erythematous and excoriated scar on anterolateral thigh, with dressing noted to have purulent drainage.        LABS:                        7.8    7.76  )-----------( 482      ( 19 Jan 2022 06:52 )             28.5     01-19    142  |  112<H>  |  7   ----------------------------<  108<H>  3.6   |  22  |  0.66    Ca    8.4      19 Jan 2022 06:50  Phos  2.2     01-19  Mg     2.4     01-19    TPro  6.6  /  Alb  3.6  /  TBili  0.2  /  DBili  x   /  AST  12  /  ALT  7<L>  /  AlkPhos  94  01-18    PT/INR - ( 18 Jan 2022 11:46 )   PT: 16.9 sec;   INR: 1.43          PTT - ( 18 Jan 2022 11:46 )  PTT:34.3 sec        RADIOLOGY & ADDITIONAL TESTS:    MEDICATIONS  (STANDING):  BACItracin   Ointment 1 Application(s) Topical three times a day  buPROPion XL (24-Hour) . 300 milliGRAM(s) Oral every 24 hours  ceFAZolin   IVPB 1000 milliGRAM(s) IV Intermittent every 8 hours  dextrose 40% Gel 15 Gram(s) Oral once  dextrose 5%. 1000 milliLiter(s) (50 mL/Hr) IV Continuous <Continuous>  dextrose 5%. 1000 milliLiter(s) (100 mL/Hr) IV Continuous <Continuous>  dextrose 50% Injectable 25 Gram(s) IV Push once  dextrose 50% Injectable 12.5 Gram(s) IV Push once  dextrose 50% Injectable 25 Gram(s) IV Push once  glucagon  Injectable 1 milliGRAM(s) IntraMuscular once  insulin lispro (ADMELOG) corrective regimen sliding scale   SubCutaneous Before meals and at bedtime  levothyroxine 112 MICROGram(s) Oral every 24 hours  liothyronine 12.5 MICROGram(s) Oral every 24 hours  lithium 150 milliGRAM(s) Oral every 24 hours  QUEtiapine 75 milliGRAM(s) Oral at bedtime  topiramate 75 milliGRAM(s) Oral every 24 hours  venlafaxine XR. 300 milliGRAM(s) Oral every 24 hours    MEDICATIONS  (PRN):  clonazePAM  Tablet 3 milliGRAM(s) Oral at bedtime PRN anxiety   **INCOMPLETE NOTE    OVERNIGHT EVENTS: Received 1 unit of pRBCs for Hb of 6.9, up to 7.8 this AM. Patient continued to scratch and pick at left leg, so the leg was covered in bacitracin and wrapped in gauze to prevent further excoriation.     SUBJECTIVE:  Patient seen and examined at bedside. Patient was somnolent and required frequent awakening and redirection during interview. She is oriented to self and place and understands she is here for her scratching and anemia. She has no acute complaints and denies any pain, drainage, or purulence from the wounds on the left lower extremity today. She noted purulence on Monday.     Pt lives at home alone and has a HHA 20 hours/week.     Vital Signs Last 12 Hrs  T(F): 97.5 (01-19-22 @ 05:23), Max: 98.9 (01-18-22 @ 20:45)  HR: 53 (01-19-22 @ 05:23) (53 - 86)  BP: 139/79 (01-19-22 @ 05:23) (122/69 - 139/79)  BP(mean): --  RR: 18 (01-19-22 @ 05:23) (17 - 18)  SpO2: 100% (01-19-22 @ 05:23) (96% - 100%)  I&O's Summary    18 Jan 2022 07:01  -  19 Jan 2022 07:00  --------------------------------------------------------  IN: 0 mL / OUT: 1500 mL / NET: -1500 mL        PHYSICAL EXAM:  Constitutional: somnolent and obese, resting in bed.  HEENT: excoriation with scabbing present on forehead, PERRLA, EOMI, no conjunctival pallor or scleral icterus, MMM. Poor dentition.   Neck: Supple, no JVD  Respiratory: CTA B/L. No w/r/r.   Cardiovascular: RRR, S1 and S2. Appreciated grade III/IV holosystolic murmur on auscultation.  Gastrointestinal: +BS, soft NTND, no guarding or rebound tenderness.. Excoriation with induration and erythema present on the abdomen.   Extremities: wwp; no cyanosis, clubbing or edema. Stasis dermatitis on the medial aspect of the right lower extremity, over medial malleolus.   Vascular: Pulses equal and strong throughout.   Neurological: AAOx2.  Skin: Several areas of excoriation and erythema noted on physical exam. Excoriation with scabbing on forehead. Excoriation with induration on abdomen. Stasis dermatitis on the medial aspect of the right lower extremity, over medial malleolus. Erythema extending from L foot to L ankle with multiple excoriations; dried brown blood, no fluctuance. Purulence and serosanguinous drainage present.  Right hip - erythematous and excoriated scar on anterolateral thigh, with dressing noted to have no purulence or fluctuance.         LABS:                        7.8    7.76  )-----------( 482      ( 19 Jan 2022 06:52 )             28.5     01-19    142  |  112<H>  |  7   ----------------------------<  108<H>  3.6   |  22  |  0.66    Ca    8.4      19 Jan 2022 06:50  Phos  2.2     01-19  Mg     2.4     01-19    TPro  6.6  /  Alb  3.6  /  TBili  0.2  /  DBili  x   /  AST  12  /  ALT  7<L>  /  AlkPhos  94  01-18    PT/INR - ( 18 Jan 2022 11:46 )   PT: 16.9 sec;   INR: 1.43          PTT - ( 18 Jan 2022 11:46 )  PTT:34.3 sec        RADIOLOGY & ADDITIONAL TESTS:    MEDICATIONS  (STANDING):  BACItracin   Ointment 1 Application(s) Topical three times a day  buPROPion XL (24-Hour) . 300 milliGRAM(s) Oral every 24 hours  ceFAZolin   IVPB 1000 milliGRAM(s) IV Intermittent every 8 hours  dextrose 40% Gel 15 Gram(s) Oral once  dextrose 5%. 1000 milliLiter(s) (50 mL/Hr) IV Continuous <Continuous>  dextrose 5%. 1000 milliLiter(s) (100 mL/Hr) IV Continuous <Continuous>  dextrose 50% Injectable 25 Gram(s) IV Push once  dextrose 50% Injectable 12.5 Gram(s) IV Push once  dextrose 50% Injectable 25 Gram(s) IV Push once  glucagon  Injectable 1 milliGRAM(s) IntraMuscular once  insulin lispro (ADMELOG) corrective regimen sliding scale   SubCutaneous Before meals and at bedtime  levothyroxine 112 MICROGram(s) Oral every 24 hours  liothyronine 12.5 MICROGram(s) Oral every 24 hours  lithium 150 milliGRAM(s) Oral every 24 hours  QUEtiapine 75 milliGRAM(s) Oral at bedtime  topiramate 75 milliGRAM(s) Oral every 24 hours  venlafaxine XR. 300 milliGRAM(s) Oral every 24 hours    MEDICATIONS  (PRN):  clonazePAM  Tablet 3 milliGRAM(s) Oral at bedtime PRN anxiety   **INCOMPLETE NOTE    OVERNIGHT EVENTS: Received 1 unit of pRBCs for Hb of 6.9, up to 7.8 this AM. Patient continued to scratch and pick at left leg, so the leg was covered in bacitracin and wrapped in gauze to prevent further excoriation.     SUBJECTIVE:  Patient seen and examined at bedside. Patient was somnolent and required frequent awakening and redirection during interview. She is oriented to self and place and understands she is here for her scratching and anemia. She has no acute complaints and denies any pain, drainage, or purulence from the wounds on the left lower extremity today. However, she noted purulence on Monday.   On ROS, pt endorsed constipation associated with iron supplements.     Pt lives at home alone and has a HHA 20 hours/week.     Vital Signs Last 12 Hrs  T(F): 97.5 (01-19-22 @ 05:23), Max: 98.9 (01-18-22 @ 20:45)  HR: 53 (01-19-22 @ 05:23) (53 - 86)  BP: 139/79 (01-19-22 @ 05:23) (122/69 - 139/79)  BP(mean): --  RR: 18 (01-19-22 @ 05:23) (17 - 18)  SpO2: 100% (01-19-22 @ 05:23) (96% - 100%)  I&O's Summary    18 Jan 2022 07:01  -  19 Jan 2022 07:00  --------------------------------------------------------  IN: 0 mL / OUT: 1500 mL / NET: -1500 mL        PHYSICAL EXAM:  Constitutional: somnolent and obese, resting in bed.  HEENT: excoriation with scabbing present on forehead, PERRLA, EOMI, no conjunctival pallor or scleral icterus, MMM. Poor dentition.   Neck: Supple, no JVD  Respiratory: CTA B/L. No w/r/r.   Cardiovascular: RRR, S1 and S2. Appreciated grade III/IV holosystolic murmur on auscultation.  Gastrointestinal: +BS, soft NTND, no guarding or rebound tenderness.. Excoriation with induration and erythema present on the abdomen.   Extremities: wwp; no cyanosis, clubbing or edema. Stasis dermatitis on the medial aspect of the right lower extremity, over medial malleolus.   Vascular: Pulses equal and strong throughout.   Neurological: AAOx2.  Skin: Several areas of excoriation and erythema noted on physical exam. Excoriation with scabbing on forehead. Excoriation with induration on abdomen. Stasis dermatitis on the medial aspect of the right lower extremity, over medial malleolus. Erythema extending from L foot to L ankle with multiple excoriations; dried brown blood, no fluctuance. Purulence and serosanguinous drainage present.  Right hip - erythematous and excoriated scar on anterolateral thigh, with dressing noted to have no purulence or fluctuance.         LABS:                        7.8    7.76  )-----------( 482      ( 19 Jan 2022 06:52 )             28.5     01-19    142  |  112<H>  |  7   ----------------------------<  108<H>  3.6   |  22  |  0.66    Ca    8.4      19 Jan 2022 06:50  Phos  2.2     01-19  Mg     2.4     01-19    TPro  6.6  /  Alb  3.6  /  TBili  0.2  /  DBili  x   /  AST  12  /  ALT  7<L>  /  AlkPhos  94  01-18    PT/INR - ( 18 Jan 2022 11:46 )   PT: 16.9 sec;   INR: 1.43          PTT - ( 18 Jan 2022 11:46 )  PTT:34.3 sec        RADIOLOGY & ADDITIONAL TESTS:    MEDICATIONS  (STANDING):  BACItracin   Ointment 1 Application(s) Topical three times a day  buPROPion XL (24-Hour) . 300 milliGRAM(s) Oral every 24 hours  ceFAZolin   IVPB 1000 milliGRAM(s) IV Intermittent every 8 hours  dextrose 40% Gel 15 Gram(s) Oral once  dextrose 5%. 1000 milliLiter(s) (50 mL/Hr) IV Continuous <Continuous>  dextrose 5%. 1000 milliLiter(s) (100 mL/Hr) IV Continuous <Continuous>  dextrose 50% Injectable 25 Gram(s) IV Push once  dextrose 50% Injectable 12.5 Gram(s) IV Push once  dextrose 50% Injectable 25 Gram(s) IV Push once  glucagon  Injectable 1 milliGRAM(s) IntraMuscular once  insulin lispro (ADMELOG) corrective regimen sliding scale   SubCutaneous Before meals and at bedtime  levothyroxine 112 MICROGram(s) Oral every 24 hours  liothyronine 12.5 MICROGram(s) Oral every 24 hours  lithium 150 milliGRAM(s) Oral every 24 hours  QUEtiapine 75 milliGRAM(s) Oral at bedtime  topiramate 75 milliGRAM(s) Oral every 24 hours  venlafaxine XR. 300 milliGRAM(s) Oral every 24 hours    MEDICATIONS  (PRN):  clonazePAM  Tablet 3 milliGRAM(s) Oral at bedtime PRN anxiety   OVERNIGHT EVENTS: Received 1 unit of pRBCs for Hb of 6.9, up to 7.8 this AM. Patient continued to scratch and pick at left leg, so the leg was covered in bacitracin and wrapped in gauze to prevent further excoriation.     SUBJECTIVE:  Patient seen and examined at bedside. Patient was somnolent and required frequent awakening and redirection during interview. She is oriented to self and place and understands she is here for her scratching and anemia. She has no acute complaints and denies any pain, drainage, or purulence from the wounds on the left lower extremity today. However, she noted purulence on Monday.   On ROS, pt endorsed constipation associated with iron supplements.     Pt lives at home alone and has a HHA 20 hours/week.     Vital Signs Last 12 Hrs  T(F): 97.5 (01-19-22 @ 05:23), Max: 98.9 (01-18-22 @ 20:45)  HR: 53 (01-19-22 @ 05:23) (53 - 86)  BP: 139/79 (01-19-22 @ 05:23) (122/69 - 139/79)  BP(mean): --  RR: 18 (01-19-22 @ 05:23) (17 - 18)  SpO2: 100% (01-19-22 @ 05:23) (96% - 100%)  I&O's Summary    18 Jan 2022 07:01  -  19 Jan 2022 07:00  --------------------------------------------------------  IN: 0 mL / OUT: 1500 mL / NET: -1500 mL        PHYSICAL EXAM:  Constitutional: somnolent and obese, resting in bed.  HEENT: excoriation with scabbing present on forehead, PERRLA, EOMI, no conjunctival pallor or scleral icterus, MMM. Poor dentition.   Neck: Supple, no JVD  Respiratory: CTA B/L. No w/r/r.   Cardiovascular: RRR, S1 and S2. Appreciated grade III/IV holosystolic murmur on auscultation.  Gastrointestinal: +BS, soft NTND, no guarding or rebound tenderness.. Excoriation with induration and erythema present on the abdomen.   Extremities: wwp; no cyanosis, clubbing or edema. Stasis dermatitis on the medial aspect of the right lower extremity, over medial malleolus.   Vascular: Pulses equal and strong throughout.   Neurological: AAOx2.  Skin: Several areas of excoriation and erythema noted on physical exam. Excoriation with scabbing on forehead. Excoriation with induration on abdomen. Stasis dermatitis on the medial aspect of the right lower extremity, over medial malleolus. Erythema extending from L foot to L ankle with multiple excoriations; dried brown blood, no fluctuance. Purulence and serosanguinous drainage present.  Right hip - erythematous and excoriated scar on anterolateral thigh, with dressing noted to have no purulence or fluctuance.         LABS:                        7.8    7.76  )-----------( 482      ( 19 Jan 2022 06:52 )             28.5     01-19    142  |  112<H>  |  7   ----------------------------<  108<H>  3.6   |  22  |  0.66    Ca    8.4      19 Jan 2022 06:50  Phos  2.2     01-19  Mg     2.4     01-19    TPro  6.6  /  Alb  3.6  /  TBili  0.2  /  DBili  x   /  AST  12  /  ALT  7<L>  /  AlkPhos  94  01-18    PT/INR - ( 18 Jan 2022 11:46 )   PT: 16.9 sec;   INR: 1.43          PTT - ( 18 Jan 2022 11:46 )  PTT:34.3 sec        RADIOLOGY & ADDITIONAL TESTS:    MEDICATIONS  (STANDING):  BACItracin   Ointment 1 Application(s) Topical three times a day  buPROPion XL (24-Hour) . 300 milliGRAM(s) Oral every 24 hours  ceFAZolin   IVPB 1000 milliGRAM(s) IV Intermittent every 8 hours  dextrose 40% Gel 15 Gram(s) Oral once  dextrose 5%. 1000 milliLiter(s) (50 mL/Hr) IV Continuous <Continuous>  dextrose 5%. 1000 milliLiter(s) (100 mL/Hr) IV Continuous <Continuous>  dextrose 50% Injectable 25 Gram(s) IV Push once  dextrose 50% Injectable 12.5 Gram(s) IV Push once  dextrose 50% Injectable 25 Gram(s) IV Push once  glucagon  Injectable 1 milliGRAM(s) IntraMuscular once  insulin lispro (ADMELOG) corrective regimen sliding scale   SubCutaneous Before meals and at bedtime  levothyroxine 112 MICROGram(s) Oral every 24 hours  liothyronine 12.5 MICROGram(s) Oral every 24 hours  lithium 150 milliGRAM(s) Oral every 24 hours  QUEtiapine 75 milliGRAM(s) Oral at bedtime  topiramate 75 milliGRAM(s) Oral every 24 hours  venlafaxine XR. 300 milliGRAM(s) Oral every 24 hours    MEDICATIONS  (PRN):  clonazePAM  Tablet 3 milliGRAM(s) Oral at bedtime PRN anxiety
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
## 2022-01-19 NOTE — DISCHARGE NOTE PROVIDER - NPI NUMBER (FOR SYSADMIN USE ONLY) :
[[id number]],[UNKNOWN],[[id number]] [[id number]],[UNKNOWN],[UNKNOWN],[UNKNOWN] [[id number]],[[id number]],[UNKNOWN],[UNKNOWN],[[id number]],[UNKNOWN] [[id number]],[[id number]],[UNKNOWN],[UNKNOWN],[UNKNOWN],[UNKNOWN] [[id number]],[[id number]],[[id number]],[UNKNOWN],[UNKNOWN],[UNKNOWN],[UNKNOWN]

## 2022-01-19 NOTE — ADVANCED PRACTICE NURSE CONSULT - RECOMMEDATIONS
Recommend Iodosorb ointment to both sites every other day, cover with telfa. Secure telfa with kerlix on LLE, cover right hip wound with ABD pad, secure with paper tape. Nystatin powder to pannus daily and periwound of right hip wound every other day (with each dressing change). Spoke with attending Stefany and ETHEL Paredes.
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
## 2022-01-19 NOTE — BH CONSULTATION LIAISON ASSESSMENT NOTE - NSBHCHARTREVIEWVS_PSY_A_CORE FT
Vital Signs Last 24 Hrs  T(C): 36.6 (19 Jan 2022 10:55), Max: 37.3 (18 Jan 2022 16:56)  T(F): 97.8 (19 Jan 2022 10:55), Max: 99.1 (18 Jan 2022 16:56)  HR: 87 (19 Jan 2022 10:55) (53 - 87)  BP: 129/73 (19 Jan 2022 10:55) (106/63 - 145/74)  BP(mean): 92 (19 Jan 2022 10:55) (92 - 92)  RR: 18 (19 Jan 2022 10:55) (17 - 18)  SpO2: 98% (19 Jan 2022 10:55) (95% - 100%)

## 2022-01-19 NOTE — PROGRESS NOTE ADULT - PROBLEM SELECTOR PLAN 10
F: encourage PO intake  E: Maintain K>4, Mg>2  N: regular  D: holding given hx of anemia  Dispo: RMF F: encourage PO intake  E: Maintain K>4, Mg>2  N: regular  D: holding given hx of anemia  Dispo: home endorses constipation due to iron supplement use  - endorses constipation due to iron supplement use  -miralax and senna prn - C/w home Topiramate 75mg qd, Seroquel 75mg qhs, Lithium 150mg qhs, Klonopin 3mg qhs, effexor, buproprion

## 2022-01-19 NOTE — PHYSICAL THERAPY INITIAL EVALUATION ADULT - MODALITIES TREATMENT COMMENTS
Of note: mild DUNCAN was noted post ambulation with stable SpO2=95% and subsequent recovery to 98% with no DUNCAN within 2 min of supine rest.
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
## 2022-01-19 NOTE — PROGRESS NOTE ADULT - PROBLEM SELECTOR PLAN 6
-Patient with history of provoked DVT after hip surgery in the past  -was previously on Eliquis, but was stopped in setting of anemia/bleeding  -no further AC at this time -Patient with history of provoked DVT after hip surgery in the past  - consider LE dopplers  -was previously on Eliquis, but was stopped in setting of anemia/bleeding  -no further AC at this time Patient states she was diagnosed with pre-DM and uses Metformin 500mg bid at home  -HbA1C 4.2 on last admission, though may be falsely low in setting of anemia.  - mISS while inpatient  - Resume home metformin 500mg BID on discharge -continue home synthroid: 112mcg + cytomel 12.5mg
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
## 2022-01-19 NOTE — DISCHARGE NOTE PROVIDER - PROVIDER TOKENS
FREE:[LAST:[Ramses],FIRST:[Selena],PHONE:[phone]],FAX:[(   )    -],ADDRESS:[[address] [place] [address]],SCHEDULEDAPPT:[01/25/2022],SCHEDULEDAPPTTIME:[09:45 AM]],FREE:[LAST:[Richard],FIRST:[Anamika],PHONE:[phone]],FAX:[(   )    -],ADDRESS:[[address] [place] [address]],SCHEDULEDAPPT:[01/24/2022],SCHEDULEDAPPTTIME:[11:30 AM]],PROVIDER:[TOKEN:[[id number]],SCHEDULEDAPPT:[02/03/2022],SCHEDULEDAPPTTIME:[03:30 PM]] PROVIDER:[TOKEN:[[id number]],SCHEDULEDAPPT:[02/03/2022],SCHEDULEDAPPTTIME:[03:30 PM]],FREE:[LAST:[Ramses],FIRST:[Selena],PHONE:[phone]],FAX:[(   )    -],ADDRESS:[[address] [place] [address]],SCHEDULEDAPPT:[01/25/2022],SCHEDULEDAPPTTIME:[09:45 AM]],FREE:[LAST:[Richard],FIRST:[Anamika],PHONE:[phone]],FAX:[(   )    -],ADDRESS:[[address] [place] [address]],SCHEDULEDAPPT:[01/24/2022],SCHEDULEDAPPTTIME:[11:30 AM]],FREE:[LAST:[[employer]],FIRST:[Rohan],PHONE:[phone]],FAX:[(   )    -],ADDRESS:[[address] [place] [address]]] PROVIDER:[TOKEN:[[id number]],SCHEDULEDAPPT:[02/03/2022],SCHEDULEDAPPTTIME:[03:30 PM]],FREE:[LAST:[Ramses],FIRST:[Selena],PHONE:[phone]:[(   )    -],ADDRESS:[[address] [place] [address]],SCHEDULEDAPPT:[01/25/2022],SCHEDULEDAPPTTIME:[09:45 AM]],FREE:[LAST:[Richard],FIRST:[Anamika],PHONE:[phone]],FAX:[(   )    -],ADDRESS:[[address] [place] [address]],SCHEDULEDAPPT:[01/24/2022],SCHEDULEDAPPTTIME:[11:30 AM]],FREE:[LAST:[[employer]],FIRST:[Rohan],PHONE:[phone]:[(   )    -],ADDRESS:[[address] [place], [place] [address]]],PROVIDER:[TOKEN:[[id number]]],FREE:[LAST:[any],PHONE:[phone]],FAX:[(   )    -],ADDRESS:[[address] [place] [address]]] PROVIDER:[TOKEN:[[id number]],SCHEDULEDAPPT:[02/03/2022],SCHEDULEDAPPTTIME:[03:30 PM]],PROVIDER:[TOKEN:[[id number]]],FREE:[LAST:[Ramses],FIRST:[Selena],PHONE:[phone]:[(   )    -],ADDRESS:[[address] [place] [address]],SCHEDULEDAPPT:[01/25/2022],SCHEDULEDAPPTTIME:[09:45 AM]],FREE:[LAST:[Richard],FIRST:[Anamika],PHONE:[phone]],FAX:[(   )    -],ADDRESS:[[address] [place] [address]],SCHEDULEDAPPT:[01/24/2022],SCHEDULEDAPPTTIME:[11:30 AM]],FREE:[LAST:[[employer]],FIRST:[Rohan],PHONE:[phone]:[(   )    -],ADDRESS:[[address] [place], [place] [address]]],FREE:[LAST:[any],PHONE:[phone]],FAX:[(   )    -],ADDRESS:[[address] [address]]] PROVIDER:[TOKEN:[[id number]],SCHEDULEDAPPT:[02/03/2022],SCHEDULEDAPPTTIME:[03:30 PM]],PROVIDER:[TOKEN:[[id number]]],PROVIDER:[TOKEN:[[id number]],SCHEDULEDAPPT:[02/10/2022],SCHEDULEDAPPTTIME:[01:20 PM]],FREE:[LAST:[Ramses],FIRST:[Selena],PHONE:[phone]],FAX:[(   )    -],ADDRESS:[[address] [place] [address]],SCHEDULEDAPPT:[01/25/2022],SCHEDULEDAPPTTIME:[09:45 AM]],FREE:[LAST:[Richard],FIRST:[Anamika],PHONE:[phone]],FAX:[(   )    -],ADDRESS:[[address] [place] [address]],SCHEDULEDAPPT:[01/24/2022],SCHEDULEDAPPTTIME:[11:30 AM]],FREE:[LAST:[[employer]],FIRST:[Rohan],PHONE:[phone]:[(   )    -],ADDRESS:[[address] [place], [place] [address]]],FREE:[LAST:[any],PHONE:[phone]:[(   )    -],ADDRESS:[[address] [address]]]

## 2022-01-19 NOTE — BH CONSULTATION LIAISON ASSESSMENT NOTE - CURRENT MEDICATION
MEDICATIONS  (STANDING):  buPROPion XL (24-Hour) . 300 milliGRAM(s) Oral every 24 hours  ceFAZolin   IVPB 1000 milliGRAM(s) IV Intermittent every 8 hours  dextrose 40% Gel 15 Gram(s) Oral once  dextrose 5%. 1000 milliLiter(s) (50 mL/Hr) IV Continuous <Continuous>  dextrose 5%. 1000 milliLiter(s) (100 mL/Hr) IV Continuous <Continuous>  dextrose 50% Injectable 25 Gram(s) IV Push once  dextrose 50% Injectable 12.5 Gram(s) IV Push once  dextrose 50% Injectable 25 Gram(s) IV Push once  glucagon  Injectable 1 milliGRAM(s) IntraMuscular once  insulin lispro (ADMELOG) corrective regimen sliding scale   SubCutaneous Before meals and at bedtime  levothyroxine 112 MICROGram(s) Oral every 24 hours  liothyronine 12.5 MICROGram(s) Oral every 24 hours  lithium 150 milliGRAM(s) Oral every 24 hours  nystatin Powder 1 Application(s) Topical two times a day  polyethylene glycol 3350 17 Gram(s) Oral daily  QUEtiapine 75 milliGRAM(s) Oral at bedtime  senna 2 Tablet(s) Oral at bedtime  topiramate 75 milliGRAM(s) Oral every 24 hours  vancomycin  IVPB 1250 milliGRAM(s) IV Intermittent every 12 hours  venlafaxine XR. 300 milliGRAM(s) Oral every 24 hours    MEDICATIONS  (PRN):  clonazePAM  Tablet 3 milliGRAM(s) Oral at bedtime PRN anxiety

## 2022-01-19 NOTE — BH CONSULTATION LIAISON ASSESSMENT NOTE - NSUNABLEASSESSPROTRISKCOMMENT_PSY_ALL_CORE
Protective factors include: pt is engaged in treatment. She has no prior hx/o SA and denies current SI. Pt is future oriented

## 2022-01-19 NOTE — PROGRESS NOTE ADULT - ASSESSMENT
Patient is a 71 year old female with a pmhx of Bipolar, excoriation disorder, HTN, preDM (on Metformin), aortic stenosis, hypothyroidism (on synthroid and cytomel), hx of right hip replacement c/b infection, Afib, and DVT (previously on Eliquis but d/c'd i/s/o anemia), anemia (s/p blood transfusion at [facility] where she gets most of her care), chronic LLE cellulitis who presents today for evaluation of chronic LLE cellulitis, which she believes she noted pus from yesterday. Pt also notes a few day history of weakness. Pt now admitted for symptomatic anemia as well as management of LLE and R hip cellulitis.  Patient is a 71 year old female with a pmhx of Bipolar, excoriation disorder, HTN, preDM (on Metformin), aortic stenosis, hypothyroidism (on synthroid and cytomel), hx of right hip replacement c/b infection, Afib, and DVT (previously on Eliquis but d/c'd i/s/o anemia), anemia (s/p blood transfusion at [facility] where she gets most of her care), chronic LLE cellulitis who presents today for evaluation of chronic LLE cellulitis and weakness. Pt now admitted for symptomatic anemia as well as management of LLE and R hip cellulitis, s/p 1 unit of pRBCs (Hb 6.9-> 7.8), started on IV cefazolin 100 mg q8 hrs. Patient is a 71 year old female with a pmhx of Bipolar, excoriation disorder, HTN, preDM (on Metformin), aortic stenosis, hypothyroidism (on synthroid and cytomel), hx of right hip replacement c/b infection, Afib, and DVT (previously on Eliquis but d/c'd i/s/o anemia), anemia (s/p blood transfusion at [facility] where she gets most of her care), chronic LLE cellulitis who presents today for evaluation of chronic LLE cellulitis and weakness. Pt now admitted for symptomatic anemia as well as management of LLE and R hip cellulitis, s/p 1 unit of pRBCs (Hb 6.9-> 7.8), started on IV cefazolin 100 mg q8 hrs.  Patient is a 71 year old female with a pmhx of Bipolar, excoriation disorder, HTN, preDM (on Metformin), aortic stenosis, hypothyroidism (on synthroid and cytomel), hx of right hip replacement c/b infection, Afib, and DVT (previously on Eliquis but d/c'd i/s/o anemia), anemia (s/p blood transfusion at [facility] where she gets most of her care), chronic LLE cellulitis who presents today for evaluation of chronic LLE cellulitis and weakness. Pt now admitted for symptomatic anemia as well as management of LLE and R hip cellulitis, s/p 1 unit of pRBCs (Hb 6.9-> 7.8), started on IV cefazolin 1000 mg q8 hrs (1/18) and vancomycin (1/19) for MRSA coverage given purulence

## 2022-01-19 NOTE — DISCHARGE NOTE PROVIDER - CARE PROVIDERS DIRECT ADDRESSES
,DirectAddress_Unknown,DirectAddress_Unknown,[email or web address] ,[email or web address],DirectAddress_Unknown,DirectAddress_Unknown,DirectAddress_Unknown ,[email or web address],DirectAddress_Unknown,DirectAddress_Unknown,DirectAddress_Unknown,DirectAddress_Unknown,DirectAddress_Unknown ,[email or web address]_Unknown,[email or web address],DirectAddress_Unknown,DirectAddress_Unknown,DirectAddress_Unknown,DirectAddress_Unknown

## 2022-01-19 NOTE — PHYSICAL THERAPY INITIAL EVALUATION ADULT - GAIT DEVIATIONS NOTED, PT EVAL
no gross loss of balance, mildly antalgic gait pattern with decreased single limb stance on LLE./decreased viridiana/increased time in double stance/increased stride width/decreased weight-shifting ability

## 2022-01-19 NOTE — PROGRESS NOTE ADULT - PROBLEM SELECTOR PLAN 11
F: encourage PO intake  E: Maintain K>4, Mg>2  N: regular  D: holding given hx of anemia  Dispo: home endorses constipation due to iron supplement use  -miralax and senna prn
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
## 2022-01-19 NOTE — PROGRESS NOTE ADULT - PROBLEM SELECTOR PLAN 9
- C/w home Topiramate 75mg qd, Seroquel 75mg qhs, Lithium 150mg qhs, Klonopin 3mg qhs, effexor, buproprion -Per outpatient note by Dr. Wyatt, Pulsujey, Dr. Clarke of [facility] most recently performed echo in 8/21 demonstrating mild aortic stenosis and moderate LA dilation. Pt reports she no longer follows with Dr. Clarke of [facility] and now follows with Dr. Booker of [facility].    #Paroxysmal atrial fibrillation.   Pt with history of paroxysmal afib after hip arthroplasty on  7/16/21. Previously took Eliquis but discontinued secondary to anemia  -pending EKG

## 2022-01-19 NOTE — BH CONSULTATION LIAISON ASSESSMENT NOTE - RISK ASSESSMENT
Suicide Risk Assessment: Low acute risk  Assessed at low risk for suicide. No current or past suicide attempts, no current SI although reports previous SI. Looking forward to going home, has support from [facility] and  she speaks with weekly as well as sister. Future-oriented and has 2 cats to take care of.    Risk factors: Hx of treatment-resistant depression, SI, physical health problems  Protective factors: Social support, ongoing  care, cats

## 2022-01-19 NOTE — PHYSICAL THERAPY INITIAL EVALUATION ADULT - ADDITIONAL COMMENTS
Pt. reports living in an elevator building with no MOSHE, she uses rollator for ambulation, she has [facility] 4days/wk x 5 hrs.

## 2022-01-19 NOTE — BH CONSULTATION LIAISON ASSESSMENT NOTE - OTHER
pt endorses memory loss since this summer. noted to have difficulty recollecting dates of surgeries/hospitalizations

## 2022-01-19 NOTE — DISCHARGE NOTE PROVIDER - NSDCMRMEDTOKEN_GEN_ALL_CORE_FT
Cytomel 25 mcg oral tablet: 0.5 tab(s) orally once a day  ferrous sulfate 325 mg (65 mg elemental iron) oral tablet: 1 tab(s) orally once a day  KlonoPIN 1 mg oral tablet: 3 tab(s) orally once a day (at bedtime)  lithium carbonate extended release: 150 milligram(s) orally once a day (at bedtime)  metFORMIN 500 mg oral tablet: 1 tab(s) orally 2 times a day  methylphenidate 55 mg/24 hr oral capsule, (20/80 release) extended release: 1 cap(s) orally once a day (in the morning)  SEROquel 25 mg oral tablet: 3 tab(s) orally once a day (at bedtime)  Synthroid 112 mcg (0.112 mg) oral tablet: 1 tab(s) orally once a day  topiramate 25 mg oral tablet: 3 tab(s) orally once a day  venlafaxine 150 mg oral capsule, extended release: 2 cap(s) orally once a day  zolpidem 10 mg oral tablet: 1 tab(s) orally once a day (at bedtime)   cephalexin 500 mg oral capsule: 1 cap(s) orally 4 times a day  Cytomel 25 mcg oral tablet: 0.5 tab(s) orally once a day  doxycycline hyclate 100 mg oral tablet: 1 tab(s) orally 2 times a day   ferrous sulfate 325 mg (65 mg elemental iron) oral tablet: 1 tab(s) orally once a day  KlonoPIN 1 mg oral tablet: 3 tab(s) orally once a day (at bedtime)  lithium carbonate extended release: 150 milligram(s) orally once a day (at bedtime)  metFORMIN 500 mg oral tablet: 1 tab(s) orally 2 times a day  methylphenidate 55 mg/24 hr oral capsule, (20/80 release) extended release: 1 cap(s) orally once a day (in the morning)  polyethylene glycol 3350 oral powder for reconstitution: 17 gram(s) orally once a day  SEROquel 25 mg oral tablet: 3 tab(s) orally once a day (at bedtime)  Synthroid 112 mcg (0.112 mg) oral tablet: 1 tab(s) orally once a day  topiramate 25 mg oral tablet: 3 tab(s) orally once a day  venlafaxine 150 mg oral capsule, extended release: 2 cap(s) orally once a day  Wellbutrin 100 mg oral tablet: 1 tab(s) orally once a day  Wellbutrin  mg/24 hours oral tablet, extended release: 1 tab(s) orally every 24 hours  zolpidem 5 mg oral tablet: 1 tab(s) orally once a day (at bedtime)

## 2022-01-19 NOTE — DISCHARGE NOTE PROVIDER - CARE PROVIDER_API CALL
Selena Peacre  [address],  [place] [address]  Phone: [phone]  Fax: (   )    -  Scheduled Appointment: 01/25/2022 09:45 AM    Anamika Ramos  [address] [place] [address]  Phone: [phone]  Fax: (   )    -  Scheduled Appointment: 01/24/2022 11:30 AM    Viv Booker)  Cardiovascular Disease; Internal Medicine  [address], [facility] [address]  [place] [address]  Phone: [phone]  Fax: [phone]  Scheduled Appointment: 02/03/2022 03:30 PM   Viv Booker)  Cardiovascular Disease; Internal Medicine  [address], [address]  [place] [address]  Phone: [phone]  Fax: [phone]  Scheduled Appointment: 02/03/2022 03:30 PM    Selena Pearce  [address] [place] [address]  Phone: [phone]  Fax: (   )    -  Scheduled Appointment: 01/25/2022 09:45 AM    Anamika Ramos  [address] [place] [address]  Phone: [phone]  Fax: (   )    -  Scheduled Appointment: 01/24/2022 11:30 AM    Rohan Moscoso  [address] [place] [address]  Phone: [phone]  Fax: (   )    -  Follow Up Time:    Viv Booker)  Cardiovascular Disease; Internal Medicine  [address], [address]  [place] [address]  Phone: [phone]  Fax: [phone]  Scheduled Appointment: 02/03/2022 03:30 PM    Selena Pearce  [address] [place] [address]  Phone: [phone]  Fax: (   )    -  Scheduled Appointment: 01/25/2022 09:45 AM    Anamika Ramos  [address] [place] [address]  Phone: [phone]  Fax: (   )    -  Scheduled Appointment: 01/24/2022 11:30 AM    Rohan Moscoso  [address] [place] [address]  Phone: [phone]  Fax: (   )    -  Follow Up Time:     JONO BEAUCHAMP  Surgery  [address] [place] [address]  Phone: ()-  Fax: ()-  Follow Up Time:     any,   [address]  [place] [address]  Phone: [phone]  Fax: (   )    -  Follow Up Time:    Viv Booker)  Cardiovascular Disease; Internal Medicine  [address], [address]  [place] [address]  Phone: [phone]  Fax: [phone]  Scheduled Appointment: 02/03/2022 03:30 PM    JONO BEAUCHAMP  Surgery  [address] [facility] [place] [address]  Phone: ()-  Fax: ()-  Follow Up Time:     Selena Pearce  [address] [place] [address] [address]  Phone: [phone]  Fax: (   )    -  Scheduled Appointment: 01/25/2022 09:45 AM    Anamika Ramos  [address] [place] [address]  Phone: [phone]  Fax: (   )    -  Scheduled Appointment: 01/24/2022 11:30 AM    Rohan Moscoso  [address] [place] [address]  Phone: [phone]  Fax: (   )    -  Follow Up Time:     any,   [address]  [place] [address]  Phone: [phone]  Fax: (   )    -  Follow Up Time:    Viv Booker)  Cardiovascular Disease; Internal Medicine  [address] [place] [address], [address]  [place] [address]  Phone: [phone]  Fax: [phone]  Scheduled Appointment: 02/03/2022 03:30 PM    JONO BEAUCHAMP  Surgery  [address] [facility] [address]  Phone: ()-  Fax: ()-  Follow Up Time:     Jairo Larsen (DO)  [facility]  [address]   [address], [place] [address]  Phone: [phone]  Fax: [phone]  Scheduled Appointment: 02/10/2022 01:20 PM    Selena Pearce  [address] [place] [address] [place] [address]  Phone: [phone]  Fax: (   )    -  Scheduled Appointment: 01/25/2022 09:45 AM    Anamika Ramos  [address] [place] [address]  Phone: [phone]  Fax: (   )    -  Scheduled Appointment: 01/24/2022 11:30 AM    Rohan Moscoso  [address] [place] [address]  Phone: [phone]  Fax: (   )    -  Follow Up Time:     any,   [address]  [place] [address]  Phone: [phone]  Fax: (   )    -  Follow Up Time:

## 2022-01-20 DIAGNOSIS — T14.8XXA OTHER INJURY OF UNSPECIFIED BODY REGION, INITIAL ENCOUNTER: ICD-10-CM

## 2022-01-20 LAB
ANION GAP SERPL CALC-SCNC: 10 MMOL/L — SIGNIFICANT CHANGE UP (ref 5–17)
BUN SERPL-MCNC: 8 MG/DL — SIGNIFICANT CHANGE UP (ref 7–23)
CALCIUM SERPL-MCNC: 8.4 MG/DL — SIGNIFICANT CHANGE UP (ref 8.4–10.5)
CHLORIDE SERPL-SCNC: 108 MMOL/L — SIGNIFICANT CHANGE UP (ref 96–108)
CO2 SERPL-SCNC: 23 MMOL/L — SIGNIFICANT CHANGE UP (ref 22–31)
CREAT SERPL-MCNC: 0.79 MG/DL — SIGNIFICANT CHANGE UP (ref 0.5–1.3)
GLUCOSE BLDC GLUCOMTR-MCNC: 130 MG/DL — HIGH (ref 70–99)
GLUCOSE BLDC GLUCOMTR-MCNC: 131 MG/DL — HIGH (ref 70–99)
GLUCOSE BLDC GLUCOMTR-MCNC: 137 MG/DL — HIGH (ref 70–99)
GLUCOSE BLDC GLUCOMTR-MCNC: 157 MG/DL — HIGH (ref 70–99)
GLUCOSE SERPL-MCNC: 116 MG/DL — HIGH (ref 70–99)
HCT VFR BLD CALC: 29 % — LOW (ref 34.5–45)
HGB BLD-MCNC: 8.3 G/DL — LOW (ref 11.5–15.5)
MAGNESIUM SERPL-MCNC: 2.2 MG/DL — SIGNIFICANT CHANGE UP (ref 1.6–2.6)
MCHC RBC-ENTMCNC: 25.2 PG — LOW (ref 27–34)
MCHC RBC-ENTMCNC: 28.6 GM/DL — LOW (ref 32–36)
MCV RBC AUTO: 88.1 FL — SIGNIFICANT CHANGE UP (ref 80–100)
NRBC # BLD: 0 /100 WBCS — SIGNIFICANT CHANGE UP (ref 0–0)
PHOSPHATE SERPL-MCNC: 2.7 MG/DL — SIGNIFICANT CHANGE UP (ref 2.5–4.5)
PLATELET # BLD AUTO: 515 K/UL — HIGH (ref 150–400)
POTASSIUM SERPL-MCNC: 3.7 MMOL/L — SIGNIFICANT CHANGE UP (ref 3.5–5.3)
POTASSIUM SERPL-SCNC: 3.7 MMOL/L — SIGNIFICANT CHANGE UP (ref 3.5–5.3)
RBC # BLD: 3.29 M/UL — LOW (ref 3.8–5.2)
RBC # FLD: 15.7 % — HIGH (ref 10.3–14.5)
SODIUM SERPL-SCNC: 141 MMOL/L — SIGNIFICANT CHANGE UP (ref 135–145)
WBC # BLD: 9.16 K/UL — SIGNIFICANT CHANGE UP (ref 3.8–10.5)
WBC # FLD AUTO: 9.16 K/UL — SIGNIFICANT CHANGE UP (ref 3.8–10.5)

## 2022-01-20 PROCEDURE — 93970 EXTREMITY STUDY: CPT | Mod: 26

## 2022-01-20 PROCEDURE — 99233 SBSQ HOSP IP/OBS HIGH 50: CPT | Mod: GC

## 2022-01-20 RX ORDER — POTASSIUM CHLORIDE 20 MEQ
40 PACKET (EA) ORAL ONCE
Refills: 0 | Status: COMPLETED | OUTPATIENT
Start: 2022-01-20 | End: 2022-01-20

## 2022-01-20 RX ORDER — ZOLPIDEM TARTRATE 10 MG/1
5 TABLET ORAL AT BEDTIME
Refills: 0 | Status: DISCONTINUED | OUTPATIENT
Start: 2022-01-20 | End: 2022-01-21

## 2022-01-20 RX ADMIN — POLYETHYLENE GLYCOL 3350 17 GRAM(S): 17 POWDER, FOR SOLUTION ORAL at 09:38

## 2022-01-20 RX ADMIN — LITHIUM CARBONATE 150 MILLIGRAM(S): 300 TABLET, EXTENDED RELEASE ORAL at 18:17

## 2022-01-20 RX ADMIN — SENNA PLUS 2 TABLET(S): 8.6 TABLET ORAL at 22:35

## 2022-01-20 RX ADMIN — Medication 112 MICROGRAM(S): at 06:11

## 2022-01-20 RX ADMIN — LIOTHYRONINE SODIUM 12.5 MICROGRAM(S): 25 TABLET ORAL at 06:11

## 2022-01-20 RX ADMIN — Medication 3 MILLIGRAM(S): at 21:17

## 2022-01-20 RX ADMIN — Medication 100 MILLIGRAM(S): at 13:00

## 2022-01-20 RX ADMIN — Medication 166.67 MILLIGRAM(S): at 08:24

## 2022-01-20 RX ADMIN — Medication 300 MILLIGRAM(S): at 06:12

## 2022-01-20 RX ADMIN — Medication 75 MILLIGRAM(S): at 18:17

## 2022-01-20 RX ADMIN — Medication 3 MILLIGRAM(S): at 00:26

## 2022-01-20 RX ADMIN — Medication 40 MILLIEQUIVALENT(S): at 08:24

## 2022-01-20 RX ADMIN — QUETIAPINE FUMARATE 75 MILLIGRAM(S): 200 TABLET, FILM COATED ORAL at 22:34

## 2022-01-20 RX ADMIN — BUPROPION HYDROCHLORIDE 300 MILLIGRAM(S): 150 TABLET, EXTENDED RELEASE ORAL at 08:38

## 2022-01-20 RX ADMIN — Medication 100 MILLIGRAM(S): at 22:34

## 2022-01-20 RX ADMIN — NYSTATIN CREAM 1 APPLICATION(S): 100000 CREAM TOPICAL at 18:18

## 2022-01-20 RX ADMIN — Medication 166.67 MILLIGRAM(S): at 18:17

## 2022-01-20 RX ADMIN — Medication 100 MILLIGRAM(S): at 06:10
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
## 2022-01-20 NOTE — PROGRESS NOTE ADULT - PROBLEM SELECTOR PLAN 10
- C/w home Topiramate 75mg qd, Seroquel 75mg qhs, Lithium 150mg qhs, Klonopin 3mg qhs, effexor, buproprion

## 2022-01-20 NOTE — PROGRESS NOTE ADULT - PROBLEM SELECTOR PLAN 7
Patient states she was diagnosed with pre-DM and uses Metformin 500mg bid at home  -HbA1C 4.2 on last admission, though may be falsely low in setting of anemia.  - mISS while inpatient  - Resume home metformin 500mg BID on discharge

## 2022-01-20 NOTE — PROGRESS NOTE ADULT - SUBJECTIVE AND OBJECTIVE BOX
**INCOMPLETE NOTE    OVERNIGHT EVENTS:    SUBJECTIVE:  Patient seen and examined at bedside.    Vital Signs Last 12 Hrs  T(F): 98.9 (01-20-22 @ 06:09), Max: 100 (01-19-22 @ 21:06)  HR: 83 (01-20-22 @ 06:09) (83 - 89)  BP: 124/70 (01-20-22 @ 06:09) (117/70 - 124/70)  BP(mean): --  RR: 17 (01-20-22 @ 06:09) (17 - 18)  SpO2: 99% (01-20-22 @ 06:09) (97% - 99%)  I&O's Summary      PHYSICAL EXAM:  Constitutional: NAD, comfortable in bed.  HEENT: NC/AT, PERRLA, EOMI, no conjunctival pallor or scleral icterus, MMM  Neck: Supple, no JVD  Respiratory: CTA B/L. No w/r/r.   Cardiovascular: RRR, normal S1 and S2, no m/r/g.   Gastrointestinal: +BS, soft NTND, no guarding or rebound tenderness, no palpable masses   Extremities: wwp; no cyanosis, clubbing or edema.   Vascular: Pulses equal and strong throughout.   Neurological: AAOx3, no CN deficits, strength and sensation intact throughout.   Skin: No gross skin abnormalities or rashes        LABS:                        8.3    9.16  )-----------( 515      ( 20 Jan 2022 06:58 )             29.0     01-20    141  |  108  |  8   ----------------------------<  116<H>  3.7   |  23  |  0.79    Ca    8.4      20 Jan 2022 06:58  Phos  2.7     01-20  Mg     2.2     01-20    TPro  6.6  /  Alb  3.6  /  TBili  0.2  /  DBili  x   /  AST  12  /  ALT  7<L>  /  AlkPhos  94  01-18    PT/INR - ( 18 Jan 2022 11:46 )   PT: 16.9 sec;   INR: 1.43          PTT - ( 18 Jan 2022 11:46 )  PTT:34.3 sec        RADIOLOGY & ADDITIONAL TESTS:    MEDICATIONS  (STANDING):  buPROPion XL (24-Hour) . 300 milliGRAM(s) Oral every 24 hours  ceFAZolin   IVPB 1000 milliGRAM(s) IV Intermittent every 8 hours  dextrose 40% Gel 15 Gram(s) Oral once  dextrose 5%. 1000 milliLiter(s) (50 mL/Hr) IV Continuous <Continuous>  dextrose 5%. 1000 milliLiter(s) (100 mL/Hr) IV Continuous <Continuous>  dextrose 50% Injectable 25 Gram(s) IV Push once  dextrose 50% Injectable 12.5 Gram(s) IV Push once  dextrose 50% Injectable 25 Gram(s) IV Push once  glucagon  Injectable 1 milliGRAM(s) IntraMuscular once  insulin lispro (ADMELOG) corrective regimen sliding scale   SubCutaneous Before meals and at bedtime  levothyroxine 112 MICROGram(s) Oral every 24 hours  liothyronine 12.5 MICROGram(s) Oral every 24 hours  lithium 150 milliGRAM(s) Oral every 24 hours  nystatin Powder 1 Application(s) Topical two times a day  polyethylene glycol 3350 17 Gram(s) Oral daily  QUEtiapine 75 milliGRAM(s) Oral at bedtime  senna 2 Tablet(s) Oral at bedtime  topiramate 75 milliGRAM(s) Oral every 24 hours  vancomycin  IVPB 1250 milliGRAM(s) IV Intermittent every 12 hours  venlafaxine XR. 300 milliGRAM(s) Oral every 24 hours    MEDICATIONS  (PRN):  clonazePAM  Tablet 3 milliGRAM(s) Oral at bedtime PRN anxiety   **INCOMPLETE NOTE    OVERNIGHT EVENTS: Patient was agitated last night, took down her dressings and scratched at her leg and hip wounds until bloody. She received 3 mg clonazepam. Wounds rinsed with saline and LLE wound was redressed.     SUBJECTIVE:  Patient seen and examined at bedside, anxious. States that she was too agitated to sleep last night because she did not receive her medications on time. She was also concerned about having a temperature of 100F. She endorses dyspnea on exertion, dry cough, and constipation. Otherwise, ROS was benign and the patient denied chest pain, weakness, light-headedness, palpitations, abdominal pain, headaches, changes in urinary habits.     Vital Signs Last 12 Hrs  T(F): 98.9 (01-20-22 @ 06:09), Max: 100 (01-19-22 @ 21:06)  HR: 83 (01-20-22 @ 06:09) (83 - 89)  BP: 124/70 (01-20-22 @ 06:09) (117/70 - 124/70)  BP(mean): --  RR: 17 (01-20-22 @ 06:09) (17 - 18)  SpO2: 99% (01-20-22 @ 06:09) (97% - 99%)  I&O's Summary      PHYSICAL EXAM:  Constitutional: anxious, sheets stained with dried blood, patient laying in own urine  HEENT: excoriation with scabbing present on forehead, PERRLA, EOMI, no conjunctival pallor or scleral icterus, MMM. Poor dentition.   Neck: Supple, no JVD  Respiratory: CTA B/L. Crackles appreciated at lung bases.   Cardiovascular: RRR, S1 and S2. Appreciated grade III/IV holosystolic murmur on auscultation.  Gastrointestinal: +BS, soft NTND, no guarding or rebound tenderness. Excoriation with induration and erythema present on the abdomen.   Extremities: wwp; no cyanosis, clubbing or edema. Stasis dermatitis on the medial aspect of the right lower extremity, over medial malleolus.   Vascular: Pulses equal and strong throughout.   Neurological: AAOx3.  Skin: Several areas of excoriation and erythema noted on physical exam.   1. Excoriation with scabbing on forehead and lateral to right eyebrow.   2. Excoriation with induration on abdomen.  3.  Stasis dermatitis on the medial aspect of the right lower extremity, over medial malleolus.   4. Periwound erythema  and wound extending from L foot to L ankle with multiple excoriations; dried brown blood, no fluctuance. Serosanguinous drainage present, purulence not appreciated today. Wound borders unchanged from yesterday.   5. Right hip - erythematous and excoriated scar on anterolateral thigh, with dressing noted to have no purulence or fluctuance. Periwound scaling fungal infection.   7. Excoriation at the Right shoulder and Left upper thigh.         LABS:                        8.3    9.16  )-----------( 515      ( 20 Jan 2022 06:58 )             29.0     01-20    141  |  108  |  8   ----------------------------<  116<H>  3.7   |  23  |  0.79    Ca    8.4      20 Jan 2022 06:58  Phos  2.7     01-20  Mg     2.2     01-20    TPro  6.6  /  Alb  3.6  /  TBili  0.2  /  DBili  x   /  AST  12  /  ALT  7<L>  /  AlkPhos  94  01-18    PT/INR - ( 18 Jan 2022 11:46 )   PT: 16.9 sec;   INR: 1.43          PTT - ( 18 Jan 2022 11:46 )  PTT:34.3 sec        RADIOLOGY & ADDITIONAL TESTS:    MEDICATIONS  (STANDING):  buPROPion XL (24-Hour) . 300 milliGRAM(s) Oral every 24 hours  ceFAZolin   IVPB 1000 milliGRAM(s) IV Intermittent every 8 hours  dextrose 40% Gel 15 Gram(s) Oral once  dextrose 5%. 1000 milliLiter(s) (50 mL/Hr) IV Continuous <Continuous>  dextrose 5%. 1000 milliLiter(s) (100 mL/Hr) IV Continuous <Continuous>  dextrose 50% Injectable 25 Gram(s) IV Push once  dextrose 50% Injectable 12.5 Gram(s) IV Push once  dextrose 50% Injectable 25 Gram(s) IV Push once  glucagon  Injectable 1 milliGRAM(s) IntraMuscular once  insulin lispro (ADMELOG) corrective regimen sliding scale   SubCutaneous Before meals and at bedtime  levothyroxine 112 MICROGram(s) Oral every 24 hours  liothyronine 12.5 MICROGram(s) Oral every 24 hours  lithium 150 milliGRAM(s) Oral every 24 hours  nystatin Powder 1 Application(s) Topical two times a day  polyethylene glycol 3350 17 Gram(s) Oral daily  QUEtiapine 75 milliGRAM(s) Oral at bedtime  senna 2 Tablet(s) Oral at bedtime  topiramate 75 milliGRAM(s) Oral every 24 hours  vancomycin  IVPB 1250 milliGRAM(s) IV Intermittent every 12 hours  venlafaxine XR. 300 milliGRAM(s) Oral every 24 hours    MEDICATIONS  (PRN):  clonazePAM  Tablet 3 milliGRAM(s) Oral at bedtime PRN anxiety       OVERNIGHT EVENTS: Patient was agitated last night, took down her dressings and scratched at her leg and hip wounds until bloody. She received 3 mg clonazepam. Wounds rinsed with saline and LLE wound was redressed.     SUBJECTIVE:  Patient seen and examined at bedside, anxious. States that she was too agitated to sleep last night because she did not receive her medications on time. She was also concerned about having a temperature of 100F. She endorses dyspnea on exertion, dry cough, and constipation. Otherwise, ROS was benign and the patient denied chest pain, weakness, light-headedness, palpitations, abdominal pain, headaches, changes in urinary habits.     Vital Signs Last 12 Hrs  T(F): 98.9 (01-20-22 @ 06:09), Max: 100 (01-19-22 @ 21:06)  HR: 83 (01-20-22 @ 06:09) (83 - 89)  BP: 124/70 (01-20-22 @ 06:09) (117/70 - 124/70)  BP(mean): --  RR: 17 (01-20-22 @ 06:09) (17 - 18)  SpO2: 99% (01-20-22 @ 06:09) (97% - 99%)  I&O's Summary      PHYSICAL EXAM:  Constitutional: anxious, sheets stained with dried blood, patient laying in own urine  HEENT: excoriation with scabbing present on forehead, PERRLA, EOMI, no conjunctival pallor or scleral icterus, MMM. Poor dentition.   Neck: Supple, no JVD  Respiratory: CTA B/L. Crackles appreciated at lung bases.   Cardiovascular: RRR, S1 and S2. Appreciated grade III/IV holosystolic murmur on auscultation.  Gastrointestinal: +BS, soft NTND, no guarding or rebound tenderness. Excoriation with induration and erythema present on the abdomen.   Extremities: wwp; no cyanosis, clubbing or edema. Stasis dermatitis on the medial aspect of the right lower extremity, over medial malleolus.   Vascular: Pulses equal and strong throughout.   Neurological: AAOx3.  Skin: Several areas of excoriation and erythema noted on physical exam.   1. Excoriation with scabbing on forehead and lateral to right eyebrow.   2. Excoriation with induration on abdomen.  3.  Stasis dermatitis on the medial aspect of the right lower extremity, over medial malleolus.   4. Periwound erythema  and wound extending from L foot to L ankle with multiple excoriations; dried brown blood, no fluctuance. Serosanguinous drainage present, purulence not appreciated today. Wound borders unchanged from yesterday.   5. Right hip - erythematous and excoriated scar on anterolateral thigh, with dressing noted to have no purulence or fluctuance. Periwound scaling fungal infection.   7. Excoriation at the Right shoulder and Left upper thigh.         LABS:                        8.3    9.16  )-----------( 515      ( 20 Jan 2022 06:58 )             29.0     01-20    141  |  108  |  8   ----------------------------<  116<H>  3.7   |  23  |  0.79    Ca    8.4      20 Jan 2022 06:58  Phos  2.7     01-20  Mg     2.2     01-20    TPro  6.6  /  Alb  3.6  /  TBili  0.2  /  DBili  x   /  AST  12  /  ALT  7<L>  /  AlkPhos  94  01-18    PT/INR - ( 18 Jan 2022 11:46 )   PT: 16.9 sec;   INR: 1.43          PTT - ( 18 Jan 2022 11:46 )  PTT:34.3 sec        RADIOLOGY & ADDITIONAL TESTS:    MEDICATIONS  (STANDING):  buPROPion XL (24-Hour) . 300 milliGRAM(s) Oral every 24 hours  ceFAZolin   IVPB 1000 milliGRAM(s) IV Intermittent every 8 hours  dextrose 40% Gel 15 Gram(s) Oral once  dextrose 5%. 1000 milliLiter(s) (50 mL/Hr) IV Continuous <Continuous>  dextrose 5%. 1000 milliLiter(s) (100 mL/Hr) IV Continuous <Continuous>  dextrose 50% Injectable 25 Gram(s) IV Push once  dextrose 50% Injectable 12.5 Gram(s) IV Push once  dextrose 50% Injectable 25 Gram(s) IV Push once  glucagon  Injectable 1 milliGRAM(s) IntraMuscular once  insulin lispro (ADMELOG) corrective regimen sliding scale   SubCutaneous Before meals and at bedtime  levothyroxine 112 MICROGram(s) Oral every 24 hours  liothyronine 12.5 MICROGram(s) Oral every 24 hours  lithium 150 milliGRAM(s) Oral every 24 hours  nystatin Powder 1 Application(s) Topical two times a day  polyethylene glycol 3350 17 Gram(s) Oral daily  QUEtiapine 75 milliGRAM(s) Oral at bedtime  senna 2 Tablet(s) Oral at bedtime  topiramate 75 milliGRAM(s) Oral every 24 hours  vancomycin  IVPB 1250 milliGRAM(s) IV Intermittent every 12 hours  venlafaxine XR. 300 milliGRAM(s) Oral every 24 hours    MEDICATIONS  (PRN):  clonazePAM  Tablet 3 milliGRAM(s) Oral at bedtime PRN anxiety

## 2022-01-20 NOTE — PROGRESS NOTE ADULT - PROBLEM SELECTOR PLAN 12
F: encourage PO intake  E: Maintain K>4, Mg>2  N: regular  D: holding given hx of anemia  Dispo: home
F: encourage PO intake  E: Maintain K>4, Mg>2  N: regular  D: holding given hx of anemia  Dispo: home

## 2022-01-20 NOTE — PROGRESS NOTE ADULT - ASSESSMENT
Patient is a 71 year old female with a pmhx of Bipolar, excoriation disorder, HTN, preDM (on Metformin), aortic stenosis, hypothyroidism (on synthroid and cytomel), hx of right hip replacement c/b infection, Afib, and DVT (previously on Eliquis but d/c'd i/s/o anemia), anemia (s/p blood transfusion at [facility] where she gets most of her care), chronic LLE cellulitis who presents today for evaluation of chronic LLE cellulitis and weakness. Pt now admitted for symptomatic anemia as well as management of LLE and R hip cellulitis, s/p 1 unit of pRBCs (Hb 6.9-> 7.8 -> 8.3), On IV cefazolin 1000 mg q8 hrs (1/18-) and vancomycin (1/19-) for MRSA coverage given purulence.

## 2022-01-20 NOTE — PROGRESS NOTE ADULT - PROBLEM SELECTOR PLAN 1
Patient admits to 10+ year history of LLE cellulitis in setting of excoriation disorder. Had multiple admissions and ED visits for cellulitis. Dr. Selena [place] of [facility] vascular surgery follows pt. and notes that pt. scratches less when wearing an Unna boot. Started on cefazolin in ED. Cefazolin 1000mg q8h for 5 days (1/18-) and vancomycin. Wound borders not improved. Patient took down dressings last night and scratched wounds until bleeding.     -c/w cefazolin and vancomycin  -Appreciate wound care recommendations  -consider unna boot inpatient  -consider 1:1 overnight for patient to not take down her dressing  -setting up appointment at wound care clinic  -arranging visiting nurse services for dressing changes Patient admits to 10+ year history of LLE cellulitis in setting of excoriation disorder. Had multiple admissions and ED visits for cellulitis. Dr. Selena Pearce of [facility] vascular surgery follows pt. and notes that pt. scratches less when wearing an Unna boot. Started on cefazolin in ED. Cefazolin 1000mg q8h for 5 days (1/18-) and vancomycin. Wound borders not improved. Patient took down dressings last night and scratched wounds until bleeding.     Spoke with wound care today - unna boots not typically applied inpatient - recommended loose ACE wrap from toes to calf to prevent picking. Recommended Dr. Memo Eller () at [facility] for wound care.     -c/w cefazolin and vancomycin  -f/u wound care recommendations  -consider 1:1 overnight for patient to not take down her dressing  -setting up appointment at wound care clinic  -arranging visiting nurse services for dressing changes

## 2022-01-20 NOTE — PROGRESS NOTE ADULT - PROBLEM SELECTOR PLAN 2
Management as above  -physical exam shows R hip periwound erythema with granulation tissue, scaling from fungal infection overlying periwound, no purulence or fluctuance. Mild tenderness to palpation.  -topical nystatin powder Management as above  -physical exam shows R hip periwound erythema with granulation tissue, scaling from fungal infection overlying periwound, no purulence or fluctuance. Mild tenderness to palpation.  -topical nystatin powder  -appreciating wound care recs

## 2022-01-20 NOTE — PROGRESS NOTE ADULT - PROBLEM SELECTOR PLAN 4
-Hemoglobin of 6.9 on admission s/p 1 unit of pRBCs on 1/18 in PM with improvement of Hb to 7.8. Today's Hb improved to 8.3. Pt recently started seeing heme/onc Dr. Watkins and Is s/p iron infusion x1 week. Taking iron supplements. Reports a history of blood transfusions in the past, both at [facility] and [facility]. Patient denies any symptoms of anemia (lightheadedness, dyspnea, fatigue, palpitations, chest pain).     -f/u AM CBC  -maintain active Type and Screen  -f/u outpatient with Dr. Watkins (set up)  -f/u outpatient with GI (also set up)

## 2022-01-20 NOTE — PROGRESS NOTE ADULT - PROBLEM SELECTOR PLAN 8
-Patient with history of provoked DVT after hip surgery in the past  - f/u LE dopplers  -was previously on Eliquis, but was stopped in setting of anemia/bleeding  -no further AC at this time

## 2022-01-20 NOTE — PROGRESS NOTE ADULT - PROBLEM SELECTOR PLAN 9
-Per outpatient note by Dr. Wyatt, Pulm, Dr. Clarke of [facility] most recently performed echo in 8/21 demonstrating mild aortic stenosis and moderate LA dilation. Pt reports she no longer follows with Dr. Clarke of [facility] and now follows with Dr. Booker of [facility].  -endorses dyspnea on exertion and cough  -will obtain collateral from Dr. Clarke or Dr. Booker    #Paroxysmal atrial fibrillation.   Pt with history of paroxysmal afib after hip arthroplasty on  7/16/21. Previously took Eliquis but discontinued secondary to anemia  -pending EKG

## 2022-01-20 NOTE — PROGRESS NOTE ADULT - ATTENDING COMMENTS
Patient was seen and examined at bedside on 1/20/2022 at 11 am and reevaluated at 330 pm. She reports that she had a hard time sleeping last night. Denies SOB, CP. Feels that the leg was improving until she took the dressing off and scratched the wound again. ROS is otherwise negative. Vitals, labwork and pertinent imaging reviewed - HgB has remained stable. Physical exam - NAD, AAO x 4, PERRLA, EOMI, MMM, supple neck, chest - CTA b/l, CV - rrr, s1s2, no m/r/g, and - soft, NTND, ext -  LLE w/ erythema, warmth, minimal improvement of erythema from yesterday; psych - normal affect    Plan: C/w Vancomycin, check Vanc trough, c/w wound care recs for LLE cellulitis as well as R hip wound    -Pt provided more history about previous use of blood thinners - she DENIES history of VTE, had what sounds like a one time episode of paroxysmal A-fib s/p OR which subsequently resolved and Eliquis was d/jasmyn in setting of persistent iron deficiency anemia  -Dopplers done - w/o evidence of DVT  -As HgB has remained stable will set patient up w/ close PCP and outpatient GI f/u
Patient was seen and examined at bedside on 1/19/2022 at 830 am and reevaluated at 430 pm. She reports pus at her LLE - reports wound is worsened due to her persistent scratching. Denies SOB, CP. ROS is otherwise negative. Vitals, labwork and pertinent imaging reviewed. Physical exam - NAD, AAO x 4, PERRLA, EOMI, MMM, supple neck, chest - CTA b/l, CV - rrr, s1s2, no m/r/g, and - soft, NTND, ext -  LLE w/ erythema, warmth, pus; psych - normal affect    Plan to start Vancomycin, consult wound care for care of patient's LLE cellulitis as well as R hip wound  -Will need to find measures to prevent patient from continuously scratching the wound    -Will also monitor patient's HgB - there has been a sufficient response to the 1 unit pRBC although pt has had ongoing iron deficiency anemia and there is concern for slow, subacute bleed  -Pt would likely need close GI f/u  -If no further decrease in HgB will d/c w/ outpt GI f/u otherwise will consult GI inpatient

## 2022-01-21 ENCOUNTER — TRANSCRIPTION ENCOUNTER (OUTPATIENT)
Age: 72
End: 2022-01-21

## 2022-01-21 VITALS
HEART RATE: 79 BPM | RESPIRATION RATE: 19 BRPM | SYSTOLIC BLOOD PRESSURE: 125 MMHG | TEMPERATURE: 98 F | OXYGEN SATURATION: 99 % | DIASTOLIC BLOOD PRESSURE: 72 MMHG

## 2022-01-21 DIAGNOSIS — F99 MENTAL DISORDER, NOT OTHERWISE SPECIFIED: ICD-10-CM

## 2022-01-21 LAB
ANION GAP SERPL CALC-SCNC: 10 MMOL/L — SIGNIFICANT CHANGE UP (ref 5–17)
BUN SERPL-MCNC: 11 MG/DL — SIGNIFICANT CHANGE UP (ref 7–23)
CALCIUM SERPL-MCNC: 8.7 MG/DL — SIGNIFICANT CHANGE UP (ref 8.4–10.5)
CHLORIDE SERPL-SCNC: 108 MMOL/L — SIGNIFICANT CHANGE UP (ref 96–108)
CO2 SERPL-SCNC: 24 MMOL/L — SIGNIFICANT CHANGE UP (ref 22–31)
CREAT SERPL-MCNC: 0.65 MG/DL — SIGNIFICANT CHANGE UP (ref 0.5–1.3)
GLUCOSE BLDC GLUCOMTR-MCNC: 119 MG/DL — HIGH (ref 70–99)
GLUCOSE BLDC GLUCOMTR-MCNC: 163 MG/DL — HIGH (ref 70–99)
GLUCOSE SERPL-MCNC: 120 MG/DL — HIGH (ref 70–99)
HCT VFR BLD CALC: 31.3 % — LOW (ref 34.5–45)
HGB BLD-MCNC: 8.3 G/DL — LOW (ref 11.5–15.5)
MCHC RBC-ENTMCNC: 24.6 PG — LOW (ref 27–34)
MCHC RBC-ENTMCNC: 26.5 GM/DL — LOW (ref 32–36)
MCV RBC AUTO: 92.9 FL — SIGNIFICANT CHANGE UP (ref 80–100)
NRBC # BLD: 0 /100 WBCS — SIGNIFICANT CHANGE UP (ref 0–0)
PLATELET # BLD AUTO: 484 K/UL — HIGH (ref 150–400)
POTASSIUM SERPL-MCNC: 4.1 MMOL/L — SIGNIFICANT CHANGE UP (ref 3.5–5.3)
POTASSIUM SERPL-SCNC: 4.1 MMOL/L — SIGNIFICANT CHANGE UP (ref 3.5–5.3)
RBC # BLD: 3.37 M/UL — LOW (ref 3.8–5.2)
RBC # FLD: 17.5 % — HIGH (ref 10.3–14.5)
SODIUM SERPL-SCNC: 142 MMOL/L — SIGNIFICANT CHANGE UP (ref 135–145)
VANCOMYCIN TROUGH SERPL-MCNC: 12.1 UG/ML — SIGNIFICANT CHANGE UP (ref 10–20)
WBC # BLD: 6.99 K/UL — SIGNIFICANT CHANGE UP (ref 3.8–10.5)
WBC # FLD AUTO: 6.99 K/UL — SIGNIFICANT CHANGE UP (ref 3.8–10.5)

## 2022-01-21 PROCEDURE — 36430 TRANSFUSION BLD/BLD COMPNT: CPT

## 2022-01-21 PROCEDURE — 93970 EXTREMITY STUDY: CPT

## 2022-01-21 PROCEDURE — 83036 HEMOGLOBIN GLYCOSYLATED A1C: CPT

## 2022-01-21 PROCEDURE — 86902 BLOOD TYPE ANTIGEN DONOR EA: CPT

## 2022-01-21 PROCEDURE — 83735 ASSAY OF MAGNESIUM: CPT

## 2022-01-21 PROCEDURE — 86880 COOMBS TEST DIRECT: CPT

## 2022-01-21 PROCEDURE — 99285 EMERGENCY DEPT VISIT HI MDM: CPT | Mod: 25

## 2022-01-21 PROCEDURE — 36415 COLL VENOUS BLD VENIPUNCTURE: CPT

## 2022-01-21 PROCEDURE — 86850 RBC ANTIBODY SCREEN: CPT

## 2022-01-21 PROCEDURE — 80048 BASIC METABOLIC PNL TOTAL CA: CPT

## 2022-01-21 PROCEDURE — 80202 ASSAY OF VANCOMYCIN: CPT

## 2022-01-21 PROCEDURE — 80053 COMPREHEN METABOLIC PANEL: CPT

## 2022-01-21 PROCEDURE — 85027 COMPLETE CBC AUTOMATED: CPT

## 2022-01-21 PROCEDURE — 85025 COMPLETE CBC W/AUTO DIFF WBC: CPT

## 2022-01-21 PROCEDURE — 71046 X-RAY EXAM CHEST 2 VIEWS: CPT

## 2022-01-21 PROCEDURE — 86870 RBC ANTIBODY IDENTIFICATION: CPT

## 2022-01-21 PROCEDURE — 86900 BLOOD TYPING SEROLOGIC ABO: CPT

## 2022-01-21 PROCEDURE — U0003: CPT

## 2022-01-21 PROCEDURE — 85610 PROTHROMBIN TIME: CPT

## 2022-01-21 PROCEDURE — 99239 HOSP IP/OBS DSCHRG MGMT >30: CPT

## 2022-01-21 PROCEDURE — 82962 GLUCOSE BLOOD TEST: CPT

## 2022-01-21 PROCEDURE — U0005: CPT

## 2022-01-21 PROCEDURE — 84100 ASSAY OF PHOSPHORUS: CPT

## 2022-01-21 PROCEDURE — 86922 COMPATIBILITY TEST ANTIGLOB: CPT

## 2022-01-21 PROCEDURE — 85730 THROMBOPLASTIN TIME PARTIAL: CPT

## 2022-01-21 PROCEDURE — 86901 BLOOD TYPING SEROLOGIC RH(D): CPT

## 2022-01-21 PROCEDURE — 97161 PT EVAL LOW COMPLEX 20 MIN: CPT

## 2022-01-21 PROCEDURE — P9016: CPT

## 2022-01-21 RX ORDER — CEPHALEXIN 500 MG
1 CAPSULE ORAL
Qty: 28 | Refills: 0
Start: 2022-01-21 | End: 2022-01-27

## 2022-01-21 RX ORDER — POLYETHYLENE GLYCOL 3350 17 G/17G
17 POWDER, FOR SOLUTION ORAL
Qty: 510 | Refills: 0
Start: 2022-01-21 | End: 2022-02-19

## 2022-01-21 RX ORDER — VANCOMYCIN HCL 1 G
1250 VIAL (EA) INTRAVENOUS EVERY 12 HOURS
Refills: 0 | Status: DISCONTINUED | OUTPATIENT
Start: 2022-01-21 | End: 2022-01-21

## 2022-01-21 RX ORDER — BUPROPION HYDROCHLORIDE 150 MG/1
1 TABLET, EXTENDED RELEASE ORAL
Qty: 0 | Refills: 0 | DISCHARGE

## 2022-01-21 RX ORDER — CEPHALEXIN 500 MG
500 CAPSULE ORAL
Refills: 0 | Status: DISCONTINUED | OUTPATIENT
Start: 2022-01-21 | End: 2022-01-21

## 2022-01-21 RX ORDER — ACETAMINOPHEN 500 MG
650 TABLET ORAL EVERY 6 HOURS
Refills: 0 | Status: DISCONTINUED | OUTPATIENT
Start: 2022-01-21 | End: 2022-01-21

## 2022-01-21 RX ORDER — ZOLPIDEM TARTRATE 10 MG/1
1 TABLET ORAL
Qty: 0 | Refills: 0 | DISCHARGE

## 2022-01-21 RX ADMIN — Medication 100 MILLIGRAM(S): at 05:11

## 2022-01-21 RX ADMIN — Medication 1: at 11:51

## 2022-01-21 RX ADMIN — Medication 112 MICROGRAM(S): at 06:23

## 2022-01-21 RX ADMIN — BUPROPION HYDROCHLORIDE 300 MILLIGRAM(S): 150 TABLET, EXTENDED RELEASE ORAL at 07:07

## 2022-01-21 RX ADMIN — Medication 166.67 MILLIGRAM(S): at 11:17

## 2022-01-21 RX ADMIN — LIOTHYRONINE SODIUM 12.5 MICROGRAM(S): 25 TABLET ORAL at 06:23

## 2022-01-21 RX ADMIN — Medication 300 MILLIGRAM(S): at 06:24

## 2022-01-21 RX ADMIN — Medication 500 MILLIGRAM(S): at 12:14

## 2022-01-21 RX ADMIN — POLYETHYLENE GLYCOL 3350 17 GRAM(S): 17 POWDER, FOR SOLUTION ORAL at 11:18

## 2022-01-21 NOTE — DISCHARGE NOTE NURSING/CASE MANAGEMENT/SOCIAL WORK - PATIENT PORTAL LINK FT
You can access the FollowMyHealth Patient Portal offered by  by registering at the following website: [email or web address]. By joining [facility] FollowMyHealth portal, you will also be able to view your health information using other applications (apps) compatible with our system.

## 2022-01-21 NOTE — DISCHARGE NOTE NURSING/CASE MANAGEMENT/SOCIAL WORK - NSDCPEFALRISK_GEN_ALL_CORE
For information on Fall & Injury Prevention, visit: [email or web address] OR  [email or web address] OR  https://www.cdc.gov/steadi/patient.html

## 2022-01-21 NOTE — DISCHARGE NOTE NURSING/CASE MANAGEMENT/SOCIAL WORK - NSDCFUADDAPPT_GEN_ALL_CORE_FT
Vascular:  1. You have a follow-up appointment with your vascular surgeon Dr. Selena Pearce on Tuesday 1/25 at 9:45 am. We have reached out to several wound clinics to establish care. Please look out for any phone calls for upcoming appointments.     Hematology:  2. You have a follow-up appointment with your hematologist Dr. Anamika Watkins on Monday 1/24 at 11:30 am for an IV iron infusion. You also have a follow-up appointment to see Dr. Anamika Watkins on Wednesday 1/26 at 1:30PM.    Cardiology:  4. You have a follow-up appointment with your cardiologist Dr. Booker on Thursday 2/3 at 3:30 PM.     Primary Care:  5. You have a follow-up appointment with a new primary care physician, Dr. Larsen on Thursday 2/10 at 1:20 PM.     Psychiatrist:  We recommend that you schedule an appointment with Dr. Moscoso because it is important to follow-up with doctors who are familiar with you and can continue to prescribe your medications. There are currently long wait-lists to see psychiatrists and it is important that you have care in the meantime while looking for a new psychiatrists.   We have a few recommendations to establish new care. For psychiatrists and therapists that participate with insurance, please contact the [facility]  at  ext [phone]. You can also contact Dr. Sweeney to establish care .

## 2022-01-26 DIAGNOSIS — I10 ESSENTIAL (PRIMARY) HYPERTENSION: ICD-10-CM

## 2022-01-26 DIAGNOSIS — L03.116 CELLULITIS OF LEFT LOWER LIMB: ICD-10-CM

## 2022-01-26 DIAGNOSIS — L03.115 CELLULITIS OF RIGHT LOWER LIMB: ICD-10-CM

## 2022-01-26 DIAGNOSIS — Z91.018 ALLERGY TO OTHER FOODS: ICD-10-CM

## 2022-01-26 DIAGNOSIS — F33.9 MAJOR DEPRESSIVE DISORDER, RECURRENT, UNSPECIFIED: ICD-10-CM

## 2022-01-26 DIAGNOSIS — E11.9 TYPE 2 DIABETES MELLITUS WITHOUT COMPLICATIONS: ICD-10-CM

## 2022-01-26 DIAGNOSIS — D62 ACUTE POSTHEMORRHAGIC ANEMIA: ICD-10-CM

## 2022-01-26 DIAGNOSIS — Z60.2 PROBLEMS RELATED TO LIVING ALONE: ICD-10-CM

## 2022-01-26 DIAGNOSIS — Z98.890 OTHER SPECIFIED POSTPROCEDURAL STATES: ICD-10-CM

## 2022-01-26 DIAGNOSIS — Z79.84 LONG TERM (CURRENT) USE OF ORAL HYPOGLYCEMIC DRUGS: ICD-10-CM

## 2022-01-26 DIAGNOSIS — F41.9 ANXIETY DISORDER, UNSPECIFIED: ICD-10-CM

## 2022-01-26 DIAGNOSIS — Z86.718 PERSONAL HISTORY OF OTHER VENOUS THROMBOSIS AND EMBOLISM: ICD-10-CM

## 2022-01-26 DIAGNOSIS — Z79.890 HORMONE REPLACEMENT THERAPY: ICD-10-CM

## 2022-01-26 DIAGNOSIS — F42.4 EXCORIATION (SKIN-PICKING) DISORDER: ICD-10-CM

## 2022-01-26 DIAGNOSIS — I48.0 PAROXYSMAL ATRIAL FIBRILLATION: ICD-10-CM

## 2022-01-26 DIAGNOSIS — K59.03 DRUG INDUCED CONSTIPATION: ICD-10-CM

## 2022-01-26 DIAGNOSIS — D50.9 IRON DEFICIENCY ANEMIA, UNSPECIFIED: ICD-10-CM

## 2022-01-26 DIAGNOSIS — Z96.651 PRESENCE OF RIGHT ARTIFICIAL KNEE JOINT: ICD-10-CM

## 2022-01-26 DIAGNOSIS — D64.9 ANEMIA, UNSPECIFIED: ICD-10-CM

## 2022-01-26 DIAGNOSIS — E03.9 HYPOTHYROIDISM, UNSPECIFIED: ICD-10-CM

## 2022-01-26 DIAGNOSIS — Z79.899 OTHER LONG TERM (CURRENT) DRUG THERAPY: ICD-10-CM

## 2022-01-26 DIAGNOSIS — Z96.643 PRESENCE OF ARTIFICIAL HIP JOINT, BILATERAL: ICD-10-CM

## 2022-01-26 DIAGNOSIS — T45.4X5A ADVERSE EFFECT OF IRON AND ITS COMPOUNDS, INITIAL ENCOUNTER: ICD-10-CM

## 2022-01-26 DIAGNOSIS — Z62.819 PERSONAL HISTORY OF UNSPECIFIED ABUSE IN CHILDHOOD: ICD-10-CM

## 2022-01-26 DIAGNOSIS — I35.0 NONRHEUMATIC AORTIC (VALVE) STENOSIS: ICD-10-CM

## 2022-01-26 SDOH — SOCIAL STABILITY - SOCIAL INSECURITY: PROBLEMS RELATED TO LIVING ALONE: Z60.2

## 2022-02-03 ENCOUNTER — APPOINTMENT (OUTPATIENT)
Dept: HEART AND VASCULAR | Facility: CLINIC | Age: 72
End: 2022-02-03

## 2022-02-10 ENCOUNTER — APPOINTMENT (OUTPATIENT)
Dept: FAMILY MEDICINE | Facility: CLINIC | Age: 72
End: 2022-02-10

## 2022-02-15 ENCOUNTER — APPOINTMENT (OUTPATIENT)
Dept: GASTROENTEROLOGY | Facility: CLINIC | Age: 72
End: 2022-02-15

## 2022-02-18 ENCOUNTER — APPOINTMENT (OUTPATIENT)
Dept: INTERNAL MEDICINE | Facility: CLINIC | Age: 72
End: 2022-02-18

## 2022-03-01 ENCOUNTER — APPOINTMENT (OUTPATIENT)
Dept: HEART AND VASCULAR | Facility: CLINIC | Age: 72
End: 2022-03-01
Payer: MEDICARE

## 2022-03-01 VITALS
HEIGHT: 63.5 IN | DIASTOLIC BLOOD PRESSURE: 68 MMHG | OXYGEN SATURATION: 98 % | HEART RATE: 78 BPM | TEMPERATURE: 97.3 F | SYSTOLIC BLOOD PRESSURE: 126 MMHG

## 2022-03-01 PROCEDURE — 93306 TTE W/DOPPLER COMPLETE: CPT

## 2022-03-01 PROCEDURE — 99214 OFFICE O/P EST MOD 30 MIN: CPT | Mod: 25

## 2022-03-01 PROCEDURE — 93000 ELECTROCARDIOGRAM COMPLETE: CPT

## 2022-03-01 NOTE — REVIEW OF SYSTEMS
[Feeling Fatigued] : feeling fatigued [Dyspnea on exertion] : dyspnea during exertion [Depression] : depression [de-identified] : left leg infection

## 2022-03-01 NOTE — REASON FOR VISIT
[FreeTextEntry1] : Patient is a 71-year-old woman who here for an evaluation of shortness of breath patient has been evaluated by cardiology at [facility] and told she was doing okay patient had hip surgery in August patient had a DVT and was placed on Eliquis patient has had a hemoglobin of 6.6 since October 27 patient was started on iron supplements meds patient went to [facility] received a transfusion and signed out AGAINST MEDICAL ADVICE patient today is complaining of shortness of breath but has a severely infected left lower extremity the last lab work on the patient shows hgb of 8.5  has lesions on her body from scratching that are bleeding patient  is noted to have a bipolar disorder patient has been seen by pulmonary that have no etiology for her shortness of breath patient was seen by Dr. Jackson at [facility] echo from August August 2021 shows mildly well aortic stenosis moderate left atrial dilatation a hemoglobin going back to 12/20/2019 was a she had a CAT scan of her chest on on 1027 that showed air trapping and patchy groundglass opacities in the left lower lobe and nonspecific she has tracheomalacia her spirometry was normal patient is followed by pulmonary

## 2022-03-01 NOTE — DISCUSSION/SUMMARY
[FreeTextEntry1] : Patient with mild aortic stenosis patient with normal EKG patient's shortness of breath is probably secondary to her chronic anemia caused by bleeding from the wounds in her leg Dr. Selena Pearce has been following them patient has a psychiatric problem where she picks at her skin this is of longstanding patient referred to Dr. Ann Gray as she will need iron infusions and possibly packed red blood cells patient understands that her anemia is due to self-inflicted wounds and is under the care of psychiatry internal medicine pulmonary and does see a hematologist up at [place] Dr. Watkins

## 2022-03-28 ENCOUNTER — INPATIENT (INPATIENT)
Facility: HOSPITAL | Age: 72
LOS: 0 days | Discharge: HOME | DRG: 812 | End: 2022-03-29
Attending: STUDENT IN AN ORGANIZED HEALTH CARE EDUCATION/TRAINING PROGRAM | Admitting: INTERNAL MEDICINE
Payer: COMMERCIAL

## 2022-03-28 ENCOUNTER — TRANSCRIPTION ENCOUNTER (OUTPATIENT)
Age: 72
End: 2022-03-28

## 2022-03-28 VITALS
SYSTOLIC BLOOD PRESSURE: 145 MMHG | TEMPERATURE: 98 F | RESPIRATION RATE: 18 BRPM | DIASTOLIC BLOOD PRESSURE: 77 MMHG | HEART RATE: 90 BPM | WEIGHT: 179.9 LBS | OXYGEN SATURATION: 96 % | HEIGHT: 63 IN

## 2022-03-28 DIAGNOSIS — Z98.890 OTHER SPECIFIED POSTPROCEDURAL STATES: Chronic | ICD-10-CM

## 2022-03-28 DIAGNOSIS — F31.9 BIPOLAR DISORDER, UNSPECIFIED: ICD-10-CM

## 2022-03-28 DIAGNOSIS — F42.9 OBSESSIVE-COMPULSIVE DISORDER, UNSPECIFIED: ICD-10-CM

## 2022-03-28 DIAGNOSIS — R63.8 OTHER SYMPTOMS AND SIGNS CONCERNING FOOD AND FLUID INTAKE: ICD-10-CM

## 2022-03-28 DIAGNOSIS — I48.0 PAROXYSMAL ATRIAL FIBRILLATION: ICD-10-CM

## 2022-03-28 DIAGNOSIS — E03.9 HYPOTHYROIDISM, UNSPECIFIED: ICD-10-CM

## 2022-03-28 DIAGNOSIS — I10 ESSENTIAL (PRIMARY) HYPERTENSION: ICD-10-CM

## 2022-03-28 DIAGNOSIS — D64.9 ANEMIA, UNSPECIFIED: ICD-10-CM

## 2022-03-28 DIAGNOSIS — Z96.651 PRESENCE OF RIGHT ARTIFICIAL KNEE JOINT: Chronic | ICD-10-CM

## 2022-03-28 DIAGNOSIS — R73.03 PREDIABETES: ICD-10-CM

## 2022-03-28 DIAGNOSIS — F42.4 EXCORIATION (SKIN-PICKING) DISORDER: ICD-10-CM

## 2022-03-28 DIAGNOSIS — I35.0 NONRHEUMATIC AORTIC (VALVE) STENOSIS: ICD-10-CM

## 2022-03-28 DIAGNOSIS — L03.119 CELLULITIS OF UNSPECIFIED PART OF LIMB: ICD-10-CM

## 2022-03-28 LAB
ALBUMIN SERPL ELPH-MCNC: 3.6 G/DL — SIGNIFICANT CHANGE UP (ref 3.3–5)
ALP SERPL-CCNC: 106 U/L — SIGNIFICANT CHANGE UP (ref 40–120)
ALT FLD-CCNC: 11 U/L — SIGNIFICANT CHANGE UP (ref 10–45)
ANION GAP SERPL CALC-SCNC: 12 MMOL/L — SIGNIFICANT CHANGE UP (ref 5–17)
APTT BLD: 37.9 SEC — HIGH (ref 27.5–35.5)
AST SERPL-CCNC: 13 U/L — SIGNIFICANT CHANGE UP (ref 10–40)
BASOPHILS # BLD AUTO: 0.02 K/UL — SIGNIFICANT CHANGE UP (ref 0–0.2)
BASOPHILS NFR BLD AUTO: 0.2 % — SIGNIFICANT CHANGE UP (ref 0–2)
BILIRUB SERPL-MCNC: 0.2 MG/DL — SIGNIFICANT CHANGE UP (ref 0.2–1.2)
BLD GP AB SCN SERPL QL: POSITIVE — SIGNIFICANT CHANGE UP
BLD GP AB SCN SERPL QL: POSITIVE — SIGNIFICANT CHANGE UP
BUN SERPL-MCNC: 8 MG/DL — SIGNIFICANT CHANGE UP (ref 7–23)
CALCIUM SERPL-MCNC: 8.7 MG/DL — SIGNIFICANT CHANGE UP (ref 8.4–10.5)
CHLORIDE SERPL-SCNC: 106 MMOL/L — SIGNIFICANT CHANGE UP (ref 96–108)
CO2 SERPL-SCNC: 22 MMOL/L — SIGNIFICANT CHANGE UP (ref 22–31)
CREAT SERPL-MCNC: 0.74 MG/DL — SIGNIFICANT CHANGE UP (ref 0.5–1.3)
EGFR: 87 ML/MIN/1.73M2 — SIGNIFICANT CHANGE UP
EOSINOPHIL # BLD AUTO: 0.21 K/UL — SIGNIFICANT CHANGE UP (ref 0–0.5)
EOSINOPHIL NFR BLD AUTO: 2.1 % — SIGNIFICANT CHANGE UP (ref 0–6)
FERRITIN SERPL-MCNC: 107 NG/ML — SIGNIFICANT CHANGE UP (ref 15–150)
FERRITIN SERPL-MCNC: 108 NG/ML — SIGNIFICANT CHANGE UP (ref 15–150)
GLUCOSE BLDC GLUCOMTR-MCNC: 106 MG/DL — HIGH (ref 70–99)
GLUCOSE BLDC GLUCOMTR-MCNC: 130 MG/DL — HIGH (ref 70–99)
GLUCOSE SERPL-MCNC: 101 MG/DL — HIGH (ref 70–99)
HAPTOGLOB SERPL-MCNC: 212 MG/DL — HIGH (ref 34–200)
HCT VFR BLD CALC: 22.7 % — LOW (ref 34.5–45)
HGB BLD-MCNC: 6.3 G/DL — CRITICAL LOW (ref 11.5–15.5)
IMM GRANULOCYTES NFR BLD AUTO: 0.4 % — SIGNIFICANT CHANGE UP (ref 0–1.5)
INR BLD: 1.4 — HIGH (ref 0.88–1.16)
IRON SATN MFR SERPL: 10 % — LOW (ref 14–50)
IRON SATN MFR SERPL: 23 UG/DL — LOW (ref 30–160)
LDH SERPL L TO P-CCNC: 373 U/L — HIGH (ref 50–242)
LITHIUM SERPL-MCNC: <.05 MMOL/L — LOW (ref 0.6–1.2)
LYMPHOCYTES # BLD AUTO: 1.45 K/UL — SIGNIFICANT CHANGE UP (ref 1–3.3)
LYMPHOCYTES # BLD AUTO: 14.7 % — SIGNIFICANT CHANGE UP (ref 13–44)
MCHC RBC-ENTMCNC: 25.8 PG — LOW (ref 27–34)
MCHC RBC-ENTMCNC: 27.8 GM/DL — LOW (ref 32–36)
MCV RBC AUTO: 93 FL — SIGNIFICANT CHANGE UP (ref 80–100)
MONOCYTES # BLD AUTO: 0.89 K/UL — SIGNIFICANT CHANGE UP (ref 0–0.9)
MONOCYTES NFR BLD AUTO: 9 % — SIGNIFICANT CHANGE UP (ref 2–14)
NEUTROPHILS # BLD AUTO: 7.28 K/UL — SIGNIFICANT CHANGE UP (ref 1.8–7.4)
NEUTROPHILS NFR BLD AUTO: 73.6 % — SIGNIFICANT CHANGE UP (ref 43–77)
NRBC # BLD: 0 /100 WBCS — SIGNIFICANT CHANGE UP (ref 0–0)
PLATELET # BLD AUTO: 555 K/UL — HIGH (ref 150–400)
POTASSIUM SERPL-MCNC: 3.9 MMOL/L — SIGNIFICANT CHANGE UP (ref 3.5–5.3)
POTASSIUM SERPL-SCNC: 3.9 MMOL/L — SIGNIFICANT CHANGE UP (ref 3.5–5.3)
PROT SERPL-MCNC: 6.3 G/DL — SIGNIFICANT CHANGE UP (ref 6–8.3)
PROTHROM AB SERPL-ACNC: 16.7 SEC — HIGH (ref 10.5–13.4)
RBC # BLD: 2.44 M/UL — LOW (ref 3.8–5.2)
RBC # FLD: 20.3 % — HIGH (ref 10.3–14.5)
RETICS #: 95.4 K/UL — SIGNIFICANT CHANGE UP (ref 25–125)
RETICS/RBC NFR: 3.9 % — HIGH (ref 0.5–2.5)
RH IG SCN BLD-IMP: POSITIVE — SIGNIFICANT CHANGE UP
RH IG SCN BLD-IMP: POSITIVE — SIGNIFICANT CHANGE UP
SARS-COV-2 RNA SPEC QL NAA+PROBE: NEGATIVE — SIGNIFICANT CHANGE UP
SODIUM SERPL-SCNC: 140 MMOL/L — SIGNIFICANT CHANGE UP (ref 135–145)
TIBC SERPL-MCNC: 235 UG/DL — SIGNIFICANT CHANGE UP (ref 220–430)
TRANSFERRIN SERPL-MCNC: 192 MG/DL — LOW (ref 200–360)
UIBC SERPL-MCNC: 212 UG/DL — SIGNIFICANT CHANGE UP (ref 110–370)
WBC # BLD: 9.89 K/UL — SIGNIFICANT CHANGE UP (ref 3.8–10.5)
WBC # FLD AUTO: 9.89 K/UL — SIGNIFICANT CHANGE UP (ref 3.8–10.5)

## 2022-03-28 PROCEDURE — 71045 X-RAY EXAM CHEST 1 VIEW: CPT | Mod: 26

## 2022-03-28 PROCEDURE — 86077 PHYS BLOOD BANK SERV XMATCH: CPT

## 2022-03-28 PROCEDURE — 99285 EMERGENCY DEPT VISIT HI MDM: CPT | Mod: FS,25

## 2022-03-28 PROCEDURE — 99221 1ST HOSP IP/OBS SF/LOW 40: CPT

## 2022-03-28 PROCEDURE — 99222 1ST HOSP IP/OBS MODERATE 55: CPT

## 2022-03-28 RX ORDER — VENLAFAXINE HCL 75 MG
300 CAPSULE, EXT RELEASE 24 HR ORAL EVERY 24 HOURS
Refills: 0 | Status: DISCONTINUED | OUTPATIENT
Start: 2022-03-29 | End: 2022-03-29

## 2022-03-28 RX ORDER — CLONAZEPAM 1 MG
3 TABLET ORAL
Qty: 0 | Refills: 0 | DISCHARGE

## 2022-03-28 RX ORDER — CLONAZEPAM 1 MG
2 TABLET ORAL AT BEDTIME
Refills: 0 | Status: DISCONTINUED | OUTPATIENT
Start: 2022-03-28 | End: 2022-03-29

## 2022-03-28 RX ORDER — METHYLPHENIDATE HCL 5 MG
1 TABLET ORAL
Qty: 0 | Refills: 0 | DISCHARGE

## 2022-03-28 RX ORDER — POLYETHYLENE GLYCOL 3350 17 G/17G
17 POWDER, FOR SOLUTION ORAL DAILY
Refills: 0 | Status: DISCONTINUED | OUTPATIENT
Start: 2022-03-28 | End: 2022-03-29

## 2022-03-28 RX ORDER — METHYLPHENIDATE HCL 5 MG
54 TABLET ORAL DAILY
Refills: 0 | Status: DISCONTINUED | OUTPATIENT
Start: 2022-03-28 | End: 2022-03-28

## 2022-03-28 RX ORDER — BUPROPION HYDROCHLORIDE 150 MG/1
1 TABLET, EXTENDED RELEASE ORAL
Qty: 0 | Refills: 0 | DISCHARGE

## 2022-03-28 RX ORDER — DEXTROSE 50 % IN WATER 50 %
25 SYRINGE (ML) INTRAVENOUS ONCE
Refills: 0 | Status: DISCONTINUED | OUTPATIENT
Start: 2022-03-28 | End: 2022-03-29

## 2022-03-28 RX ORDER — CLONAZEPAM 1 MG
3 TABLET ORAL AT BEDTIME
Refills: 0 | Status: DISCONTINUED | OUTPATIENT
Start: 2022-03-28 | End: 2022-03-28

## 2022-03-28 RX ORDER — TOPIRAMATE 25 MG
75 TABLET ORAL AT BEDTIME
Refills: 0 | Status: DISCONTINUED | OUTPATIENT
Start: 2022-03-28 | End: 2022-03-29

## 2022-03-28 RX ORDER — DEXTROSE 50 % IN WATER 50 %
15 SYRINGE (ML) INTRAVENOUS ONCE
Refills: 0 | Status: DISCONTINUED | OUTPATIENT
Start: 2022-03-28 | End: 2022-03-29

## 2022-03-28 RX ORDER — BUPROPION HYDROCHLORIDE 150 MG/1
300 TABLET, EXTENDED RELEASE ORAL DAILY
Refills: 0 | Status: DISCONTINUED | OUTPATIENT
Start: 2022-03-28 | End: 2022-03-29

## 2022-03-28 RX ORDER — ZOLPIDEM TARTRATE 10 MG/1
1 TABLET ORAL
Qty: 0 | Refills: 0 | DISCHARGE

## 2022-03-28 RX ORDER — TRAZODONE HCL 50 MG
100 TABLET ORAL AT BEDTIME
Refills: 0 | Status: DISCONTINUED | OUTPATIENT
Start: 2022-03-28 | End: 2022-03-29

## 2022-03-28 RX ORDER — GLUCAGON INJECTION, SOLUTION 0.5 MG/.1ML
1 INJECTION, SOLUTION SUBCUTANEOUS ONCE
Refills: 0 | Status: DISCONTINUED | OUTPATIENT
Start: 2022-03-28 | End: 2022-03-29

## 2022-03-28 RX ORDER — LEVOTHYROXINE SODIUM 125 MCG
112 TABLET ORAL DAILY
Refills: 0 | Status: DISCONTINUED | OUTPATIENT
Start: 2022-03-28 | End: 2022-03-29

## 2022-03-28 RX ORDER — QUETIAPINE FUMARATE 200 MG/1
25 TABLET, FILM COATED ORAL EVERY 12 HOURS
Refills: 0 | Status: DISCONTINUED | OUTPATIENT
Start: 2022-03-28 | End: 2022-03-29

## 2022-03-28 RX ORDER — QUETIAPINE FUMARATE 200 MG/1
50 TABLET, FILM COATED ORAL AT BEDTIME
Refills: 0 | Status: DISCONTINUED | OUTPATIENT
Start: 2022-03-28 | End: 2022-03-29

## 2022-03-28 RX ORDER — SODIUM CHLORIDE 9 MG/ML
1000 INJECTION, SOLUTION INTRAVENOUS
Refills: 0 | Status: DISCONTINUED | OUTPATIENT
Start: 2022-03-28 | End: 2022-03-29

## 2022-03-28 RX ORDER — LIOTHYRONINE SODIUM 25 UG/1
12.5 TABLET ORAL DAILY
Refills: 0 | Status: DISCONTINUED | OUTPATIENT
Start: 2022-03-28 | End: 2022-03-29

## 2022-03-28 RX ORDER — TOPIRAMATE 25 MG
3 TABLET ORAL
Qty: 0 | Refills: 0 | DISCHARGE

## 2022-03-28 RX ORDER — INSULIN LISPRO 100/ML
VIAL (ML) SUBCUTANEOUS
Refills: 0 | Status: DISCONTINUED | OUTPATIENT
Start: 2022-03-28 | End: 2022-03-29

## 2022-03-28 RX ORDER — LITHIUM CARBONATE 300 MG/1
150 TABLET, EXTENDED RELEASE ORAL DAILY
Refills: 0 | Status: DISCONTINUED | OUTPATIENT
Start: 2022-03-28 | End: 2022-03-29

## 2022-03-28 RX ORDER — DEXTROSE 50 % IN WATER 50 %
12.5 SYRINGE (ML) INTRAVENOUS ONCE
Refills: 0 | Status: DISCONTINUED | OUTPATIENT
Start: 2022-03-28 | End: 2022-03-29

## 2022-03-28 RX ORDER — LIOTHYRONINE SODIUM 25 UG/1
0.5 TABLET ORAL
Qty: 0 | Refills: 0 | DISCHARGE

## 2022-03-28 RX ADMIN — QUETIAPINE FUMARATE 25 MILLIGRAM(S): 200 TABLET, FILM COATED ORAL at 23:14

## 2022-03-28 RX ADMIN — BUPROPION HYDROCHLORIDE 300 MILLIGRAM(S): 150 TABLET, EXTENDED RELEASE ORAL at 22:27

## 2022-03-28 RX ADMIN — LITHIUM CARBONATE 150 MILLIGRAM(S): 300 TABLET, EXTENDED RELEASE ORAL at 22:27

## 2022-03-28 RX ADMIN — Medication 112 MICROGRAM(S): at 22:27

## 2022-03-28 RX ADMIN — Medication 100 MILLIGRAM(S): at 23:14

## 2022-03-28 RX ADMIN — Medication 2 MILLIGRAM(S): at 22:28

## 2022-03-28 RX ADMIN — Medication 75 MILLIGRAM(S): at 22:27

## 2022-03-28 RX ADMIN — QUETIAPINE FUMARATE 50 MILLIGRAM(S): 200 TABLET, FILM COATED ORAL at 22:42
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
## 2022-03-28 NOTE — BH CONSULTATION LIAISON ASSESSMENT NOTE - NSSUICPROTFACT_PSY_ALL_CORE
Fear of death or the actual act of killing self/Cultural, spiritual and/or moral attitudes against suicide

## 2022-03-28 NOTE — DISCHARGE NOTE PROVIDER - NSDCCPCAREPLAN_GEN_ALL_CORE_FT
PRINCIPAL DISCHARGE DIAGNOSIS  Diagnosis: Anemia  Assessment and Plan of Treatment: Anemia is the medical term for when a person has too few red blood cells. Red blood cells are the cells in your blood that carry oxygen. If you have too few red blood cells, your body does not get all the oxygen it needs. Most people with anemia have no symptoms. They find out they have it after their doctor does blood tests for another reason. People who do have symptoms might feel tired or weak, especially if they try to exercise or have headaches. If you experience these symptoms you should see your primary care provider for further evaluation.      SECONDARY DISCHARGE DIAGNOSES  Diagnosis: Cellulitis of lower limb  Assessment and Plan of Treatment:     Diagnosis: Bipolar disorder  Assessment and Plan of Treatment:      PRINCIPAL DISCHARGE DIAGNOSIS  Diagnosis: Anemia  Assessment and Plan of Treatment: Anemia is the medical term for when a person has too few red blood cells. Red blood cells are the cells in your blood that carry oxygen. If you have too few red blood cells, your body does not get all the oxygen it needs. Most people with anemia have no symptoms. They find out they have it after their doctor does blood tests for another reason. People who do have symptoms might feel tired or weak, especially if they try to exercise or have headaches. If you experience these symptoms you should see your primary care provider for further evaluation.      SECONDARY DISCHARGE DIAGNOSES  Diagnosis: Cellulitis of lower limb  Assessment and Plan of Treatment:     Diagnosis: Bipolar disorder  Assessment and Plan of Treatment: Please continue to follow up with Dr. Moscoso for your psychiatric conditions. If you feel like you need additional psychiatric therapy, our inpatient psychiatric team has recommended the following three options, within 2 weeks of discharge:     [facility] ([facility] Older People)  www.spop.org  Geared towards an aging population, with the understanding that their clients will need increasingly comprehensive services.  Must be over 55.    [address]  [place], [place]  [address] [phone]  Medicare, Medicaid, AARP, Aetna, Affinity, AmeriChoice, [facility], CenterLight, Cigna, [facility], BCBS, [place], GHI, HealthFirst, HIP, Optum, [place], United, ValueOptions  [facility]  [email or web address]  [address] [place] [address]  (corner of [address] and [facility])  Telephone: [phone]  Medicare, Medicaid, most private insurance     [place] [facility]  [email or web address]  Two locations:  -[address] (between [address] [place])  [place] [address]  [phone] -[address] [place], [place] [id number] [phone]       PRINCIPAL DISCHARGE DIAGNOSIS  Diagnosis: Anemia  Assessment and Plan of Treatment: Anemia is the medical term for when a person has too few red blood cells. Red blood cells are the cells in your blood that carry oxygen. If you have too few red blood cells, your body does not get all the oxygen it needs. Most people with anemia have no symptoms. They find out they have it after their doctor does blood tests for another reason. People who do have symptoms might feel tired or weak, especially if they try to exercise or have headaches. If you experience these symptoms you should see your primary care provider for further evaluation.      SECONDARY DISCHARGE DIAGNOSES  Diagnosis: Cellulitis of lower limb  Assessment and Plan of Treatment:     Diagnosis: Bipolar disorder  Assessment and Plan of Treatment: Please continue to follow up with Dr. Moscoso for your psychiatric conditions. If you feel like you need additional psychiatric therapy, our inpatient psychiatric team has recommended the following three options, within 2 weeks of discharge:  :  SPOP ([facility] Program for Older People)  www.spop.org  Geared towards an aging population, with the understanding that their clients will need increasingly comprehensive services.  Must be over 55.    [address] [place]  [address]  [phone]  Medicare, Medicaid, AARP, Aetna, Affinity, AmeriChoice, [place] [facility], CenterLight, Cigna, [facility], BCBS, [place], GHI, HealthFirst, HIP, Optum, [place], United, ValueOptions  :  [facility]  [email or web address]  [address] [place] [address]  (corner of [address] and [facility])  Telephone: [phone]  Medicare, Medicaid, most private insurance  :  [place] [facility]  [email or web address]  Two locations:  -[address] (between [address] [place] [address]  [phone] -[address] [place], [place] [address] [phone]       PRINCIPAL DISCHARGE DIAGNOSIS  Diagnosis: Anemia  Assessment and Plan of Treatment: Anemia is the medical term for when a person has too few red blood cells. Red blood cells are the cells in your blood that carry oxygen. If you have too few red blood cells, your body does not get all the oxygen it needs. Most people with anemia have no symptoms and they find out they have it after their doctor does blood tests for another reason. People who do have symptoms might feel tired or weak, especially if they try to exercise or have headaches. You felt weak and your hematologist told you your hemoglobin was low at your last appointment. You received 2 transfusions and your hemoglobin rubens appropriately (hemoglobin 8.0 on day of discharge 3/29). You also received an IV iron infusion since your iron level was low.  Please see your hematologist (blood doctor) for further evaluation of your hemoglobin and further iron infusions.   Please call your doctor or go to the nearest ER if you experience weakness, palpitations, shortness of braeth, lightheadedness, dizziness or faint, as these may be signs that your hemoglobin is again low.      SECONDARY DISCHARGE DIAGNOSES  Diagnosis: Anxiety  Assessment and Plan of Treatment: You take a number of medications to help with anxiety, but still sometimes cope with your amxiety by scratching your skin. You have been using clonazepam to help with your anxiety, but it may be a good idea to discuss the medication Hydroxyzine, which is an antihistamine that is also used to help relieve itching. This may be a viable alternative or addition to your current regimen to help manage and alleviate your symptoms.    Diagnosis: Bipolar disorder  Assessment and Plan of Treatment: Please continue to follow up with Dr. Moscoso for your psychiatric conditions. If you feel like you need additional psychiatric therapy, our inpatient psychiatric team has recommended the following three options to follow up with within 2 weeks of discharge:  :  SPOP ([facility] Program for Older People)  www.spop.org  Geared towards an aging population, with the understanding that their clients will need increasingly comprehensive services.  Must be over 55.    [address]  [address] [phone]  Medicare, Medicaid, AARP, Aetna, Affinity, AmeriChoice, [place] [facility], CenterLight, Cigna, [facility], BCBS, [place], GHI, HealthFirst, HIP, Optum, [place], United, ValueOptions  :  [facility]  [email or web address]  [address] [place] [address]  (corner of [address] and [facility])  Telephone: [phone]  Medicare, Medicaid, most private insurance  :  [place] [facility]  [email or web address]  Two locations:  -[address] (between [address])  [place], [place] [address] [phone] -[address] [place], [place] [id number] [phone]

## 2022-03-28 NOTE — H&P ADULT - PROBLEM SELECTOR PLAN 5
Most recently performed echo in 8/21 demonstrating mild aortic stenosis and moderate LA dilation. Follows Dr. Booker of [facility]. Systolic III/VI murmur present.  - F/u w/ cardiology Dr. Booker outpatient Pt with history of paroxysmal afib after hip arthroplasty on  7/16/21. Previously took Eliquis but discontinued secondary to anemia  - CHADVASC 3, HAS-BLED 2 (moderate risk).  - Given anemia, will continue to hold AC as per outpatient.    #History of DVT  - As above, will hold AC  - Hold SCDs given cellulitis

## 2022-03-28 NOTE — ED PROVIDER NOTE - OBJECTIVE STATEMENT
71F with a h/o chronic cellulitis (admitted 12/1-12/3 sent home on 2 weeks of bactrim), bipolar, excoriation disorder, HTN, preDM, mild aortic stenosis, hypothyroidism, hx of right hip surgery in 7/2021 c/b infection Afib, and DVT (previously on Eliquis but d/c'd i/s/o anemia), anemia, presents for HGB of 6.9 on outpatient labs. Patient states she gets iron infusions on a regular basis and had bloodwork done last week, told HGB was 6.9 and should come to the hospital, admits to generalized fatigue and SOB over the last few weeks. Denies chest pain, SOB, dizziness, nausea, vomiting, black or bloody stools. Patient states she loses a lot of blood because she is a "skin ".

## 2022-03-28 NOTE — DISCHARGE NOTE PROVIDER - NSFTFHOMEHTHYNRD_GEN_ALL_CORE
Patient asked for a urine sample and stated \"I will not pee for you guys, I'll just get up and pee on the floor. \"     Richard Santillan  09/17/20 1459 Yes

## 2022-03-28 NOTE — ED PROVIDER NOTE - PHYSICAL EXAMINATION
CONSTITUTIONAL: Well-appearing; in no apparent distress.   HEAD: Normocephalic; atraumatic.   EYES: PERRL; EOM intact; conjunctiva and sclera clear  ENT: normal nose; no rhinorrhea; normal pharynx with no erythema or lesions.   NECK: Supple; non-tender; no LAD  CARDIOVASCULAR: Normal S1, S2; no murmurs, rubs, or gallops. Regular rate and rhythm.   RESPIRATORY: Breathing easily; breath sounds clear and equal bilaterally; no wheezes, rhonchi, or rales.  GI: Soft; non-distended; non-tender; no palpable organomegaly.   MSK: FROM at all extremities, normal tone   EXT: No cyanosis or edema; N/V intact  SKIN: Normal for age and race; warm; dry; good turgor; no apparent lesions or rash.   NEURO: A & O x 3; face symmetric; grossly unremarkable.   PSYCHOLOGICAL: The patient’s mood and manner are appropriate. CONSTITUTIONAL: Well-appearing; in no apparent distress.   HEAD: Normocephalic; atraumatic.   EYES: PERRL; EOM intact; conjunctiva and sclera clear  ENT: normal nose; no rhinorrhea; normal pharynx with no erythema or lesions.   NECK: Supple; non-tender; no LAD  CARDIOVASCULAR: Normal S1, S2; no murmurs, rubs, or gallops. Regular rate and rhythm.   RESPIRATORY: Breathing easily; breath sounds clear and equal bilaterally; no wheezes, rhonchi, or rales.  GI: Soft; non-distended; non-tender; no palpable organomegaly.   MSK: FROM at all extremities, normal tone   NEURO: A & O x 3; face symmetric; grossly unremarkable.   PSYCHOLOGICAL: The patient’s mood and manner are appropriate.

## 2022-03-28 NOTE — BH CONSULTATION LIAISON ASSESSMENT NOTE - NSBHCHARTREVIEWVS_PSY_A_CORE FT
Vital Signs Last 24 Hrs  T(C): 36.5 (28 Mar 2022 14:25), Max: 36.6 (28 Mar 2022 12:21)  T(F): 97.7 (28 Mar 2022 14:25), Max: 97.8 (28 Mar 2022 12:21)  HR: 58 (28 Mar 2022 14:25) (58 - 90)  BP: 161/91 (28 Mar 2022 14:25) (145/77 - 161/91)  BP(mean): --  RR: 18 (28 Mar 2022 14:25) (18 - 18)  SpO2: 96% (28 Mar 2022 14:25) (96% - 96%)

## 2022-03-28 NOTE — PATIENT PROFILE ADULT - FALL HARM RISK - HARM RISK INTERVENTIONS

## 2022-03-28 NOTE — BH CONSULTATION LIAISON ASSESSMENT NOTE - NSBHCONSULTFOLLOWAFTERCARE_PSY_A_CORE FT
Continue to see your psychiatrist for ongoing care, see within 5 days of discharge.  Therapy is also recommended within 2weeks of discharge at one of the following:  •	SPOP ([facility] Program for Older People)  o	www.spop.org  o	Geared towards an aging population, with the understanding that their clients will need increasingly comprehensive services.  Must be over 55.    o	[address] [place]  [address]  [phone]  o	Medicare, Medicaid, AARP, Aetna, Affinity, AmeriChoice, [place] [facility] [employer], CenterLight, Cigna, [facility], BCBS, [place], GHI, HealthFirst, HIP, Optum, [place], United, ValueOptions  •	[facility]  o	[email or web address]  o	[address] [place] [address]  (corner [address] [facility] [address] and [facility])  Telephone: [phone]  o	Medicare, Medicaid, most private insurance  •	[place] [facility]  o	[email or web address]  o	Two locations  ?	[address]  (between [address])  [place], [place] [address] [phone]  ?	[address] [place], [place] [address] [phone]  o	Medicare, Medicaid, most private insurance
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
## 2022-03-28 NOTE — BH CONSULTATION LIAISON ASSESSMENT NOTE - SUMMARY
Patient is a 71 year old single  female, domiciled alone, retired classical cellist/teacher with a hx of "agitated depression", excoriation disorder, HTN, pre DM (on Metformin), aortic stenosis, hypothyroidism (on synthroid and cytomel), hx of right hip replacement c/b infection, Afib, and DVT (previously on Eliquis but d/c-ed i/s/o anemia), anemia needing transufions, chronic LLE cellulitis who presented today sent from Dr office for low Hgb.  Pt being admitted to medicine for anemia management.    Pt reports significant depression, no SI, reports that mood is lowest it has been but this has been constant for a year, with no recent worsening.  Does not endorse or appear c/w manic episode.  She has been taking some Rx other than prescribed.  Would seek to maintain stability on most medications for now to avoid withdrawal or discontinuation sx.      -no indication for psychiatric admission  -would get Li level and EKG  Would continue the following for now:  effexor XR 300mg PO qAM  wellbutrin XR 300mg PO qAM  Lithium 150mg PO qHS  seroquel 75mg PO qHS  topamax 75mg PO qHS  Would hold methylphenidate, unclear need while in hospital  Could reduce melatonin to 3mg PO qHS  Could reduce clonazepam to 3mg PO qHS for now, monitor for withdrawal  -referrals for therapy etc as below  -please call if questions

## 2022-03-28 NOTE — H&P ADULT - NSHPPHYSICALEXAM_GEN_ALL_CORE
T(C): 36.5 (03-28-22 @ 14:25), Max: 36.6 (03-28-22 @ 12:21)  HR: 58 (03-28-22 @ 14:25) (58 - 90)  BP: 161/91 (03-28-22 @ 14:25) (145/77 - 161/91)  RR: 18 (03-28-22 @ 14:25) (18 - 18)  SpO2: 96% (03-28-22 @ 14:25) (96% - 96%)    General: NAD, laying in bed, speaking in full sentences  HEENT: No conjunctival injection, EOMI, MMM  Neck: supple, JVD not visualized  Cardio: RRR, +S1/S2, III/VI systolic murmur, no thrills, rubs, or gallops  Resp: lungs CTAB, no cough/wheezes/rales/rhonchi  Abdo: soft, NT, ND, +bowel sounds x4, no organomegaly or palpable mass    Extremities: WWP, no edema/cyanosis/clubbing   Vasc: 2+ radial and DP pulses b/l  Neuro: A&Ox3  Psych: Tearful affect, anxious, tangential.  Skin: RLE WNL. LLE: erythema extending from foot to knee, with granulation tissue scattered throughout and dried blood. No purulence or fluctuance noted. R hip with erythema and granulation tissue, no purulence  MSK: no joint swelling T(C): 36.5 (03-28-22 @ 14:25), Max: 36.6 (03-28-22 @ 12:21)  HR: 58 (03-28-22 @ 14:25) (58 - 90)  BP: 161/91 (03-28-22 @ 14:25) (145/77 - 161/91)  RR: 18 (03-28-22 @ 14:25) (18 - 18)  SpO2: 96% (03-28-22 @ 14:25) (96% - 96%)    General: NAD, laying in bed, speaking in full sentences  HEENT: No conjunctival injection, EOMI, MMM  Neck: supple, JVD not visualized  Cardio: RRR, +S1/S2, III/VI systolic murmur, no thrills, rubs, or gallops  Resp: lungs CTAB, no cough/wheezes/rales/rhonchi  Abdo: soft, NT, ND, +bowel sounds x4, no organomegaly or palpable mass    Extremities: WWP, no edema/cyanosis/clubbing   Vasc: 2+ radial and DP pulses b/l  Neuro: A&Ox3  Psych: Tearful affect, anxious, tangential.  Skin: LLE painful, erythema extending from foot to knee, with granulation tissue scattered throughout and dried blood. Some purulence noted, no fluctuance. R hip, R shoulder, forehead, abdomen with erythema and granulation tissue, no purulence.  MSK: no joint swelling

## 2022-03-28 NOTE — ED PROVIDER NOTE - NS ED ATTENDING STATEMENT MOD
This was a shared visit with the MAYCOL. I reviewed and verified the documentation and independently performed the documented:

## 2022-03-28 NOTE — H&P ADULT - PROBLEM SELECTOR PROBLEM 5
disoriented to place/disoriented to person/disoriented to time/situation Aortic stenosis Paroxysmal atrial fibrillation

## 2022-03-28 NOTE — DISCHARGE NOTE PROVIDER - PROVIDER TOKENS
PROVIDER:[TOKEN:[[id number]],FOLLOWUP:[2 weeks],ESTABLISHEDPATIENT:[T]],FREE:[LAST:[Roland],FIRST:[Anamika],PHONE:[phone]:[(   )    -],ADDRESS:[[address] [place] [address]],FOLLOWUP:[Routine],ESTABLISHEDPATIENT:[T]],FREE:[LAST:[Grupo Herrera],FIRST:[Rohan],PHONE:[phone]:[(   )    -],ADDRESS:[[address] [place] [address]],FOLLOWUP:[2 weeks],ESTABLISHEDPATIENT:[T]],PROVIDER:[TOKEN:[[id number]],FOLLOWUP:[Routine],ESTABLISHEDPATIENT:[T]]

## 2022-03-28 NOTE — DISCHARGE NOTE PROVIDER - NSDCMRMEDTOKEN_GEN_ALL_CORE_FT
cephalexin 500 mg oral capsule: 1 cap(s) orally 4 times a day  Cytomel 25 mcg oral tablet: 0.5 tab(s) orally once a day  doxycycline hyclate 100 mg oral tablet: 1 tab(s) orally 2 times a day   ferrous sulfate 325 mg (65 mg elemental iron) oral tablet: 1 tab(s) orally once a day  KlonoPIN 1 mg oral tablet: 3 tab(s) orally once a day (at bedtime)  lithium carbonate extended release: 150 milligram(s) orally once a day (at bedtime)  metFORMIN 500 mg oral tablet: 1 tab(s) orally 2 times a day  methylphenidate 55 mg/24 hr oral capsule, (20/80 release) extended release: 1 cap(s) orally once a day (in the morning)  polyethylene glycol 3350 oral powder for reconstitution: 17 gram(s) orally once a day  SEROquel 25 mg oral tablet: 3 tab(s) orally once a day (at bedtime)  Synthroid 112 mcg (0.112 mg) oral tablet: 1 tab(s) orally once a day  topiramate 25 mg oral tablet: 3 tab(s) orally once a day  venlafaxine 150 mg oral capsule, extended release: 2 cap(s) orally once a day  Wellbutrin 100 mg oral tablet: 1 tab(s) orally once a day  Wellbutrin  mg/24 hours oral tablet, extended release: 1 tab(s) orally every 24 hours  zolpidem 5 mg oral tablet: 1 tab(s) orally once a day (at bedtime)   Cytomel 25 mcg oral tablet: 0.5 tab(s) orally once a day  ferrous sulfate 325 mg (65 mg elemental iron) oral tablet: 1 tab(s) orally once a day  KlonoPIN 1 mg oral tablet: 3 tab(s) orally once a day (at bedtime), As Needed  lithium carbonate extended release: 150 milligram(s) orally once a day (at bedtime)  metFORMIN 500 mg oral tablet: 1 tab(s) orally 2 times a day  methylphenidate 55 mg/24 hr oral capsule, (20/80 release) extended release: 1 cap(s) orally once a day (in the morning)  polyethylene glycol 3350 oral powder for reconstitution: 17 gram(s) orally once a day  SEROquel 25 mg oral tablet: 3 tab(s) orally once a day (at bedtime)  Synthroid 112 mcg (0.112 mg) oral tablet: 1 tab(s) orally once a day  topiramate 25 mg oral tablet: 3 tab(s) orally once a day (at bedtime), As Needed  traZODone 50 mg oral tablet: 3 tab(s) orally once a day (at bedtime), As Needed  venlafaxine 150 mg oral capsule, extended release: 2 cap(s) orally once a day  Wellbutrin  mg/24 hours oral tablet, extended release: 1 tab(s) orally every 24 hours   Cytomel 25 mcg oral tablet: 0.5 tab(s) orally once a day  ferrous sulfate 325 mg (65 mg elemental iron) oral tablet: 1 tab(s) orally once a day  KlonoPIN 1 mg oral tablet: 3 tab(s) orally once a day (at bedtime), As Needed  lithium carbonate extended release: 150 milligram(s) orally once a day (at bedtime)  metFORMIN 500 mg oral tablet: 1 tab(s) orally 2 times a day  polyethylene glycol 3350 oral powder for reconstitution: 17 gram(s) orally once a day  SEROquel 25 mg oral tablet: 3 tab(s) orally once a day (at bedtime)  Synthroid 112 mcg (0.112 mg) oral tablet: 1 tab(s) orally once a day  topiramate 25 mg oral tablet: 3 tab(s) orally once a day (at bedtime), As Needed  traZODone 50 mg oral tablet: 3 tab(s) orally once a day (at bedtime), As Needed  venlafaxine 150 mg oral capsule, extended release: 2 cap(s) orally once a day  Wellbutrin  mg/24 hours oral tablet, extended release: 1 tab(s) orally every 24 hours

## 2022-03-28 NOTE — H&P ADULT - PROBLEM SELECTOR PLAN 2
Sees a "psychopharmacologist" who she says is "not helping her anymore".  - F/u psych consult Sees a "psychopharmacologist" who she says is "not helping her anymore". Sees psychiatrist Dr. Moscoso. On Lithium 150 qd, topiramate 75 qhs, Welbutrin 300 qd,  trazodone 50 1-3 tabs qhs PRN.  - C/w home Lithium, topiramate, Welbutrin, Trazodone PRN  - F/u psych consult for worsening of OCD, panic attacks, and bipolar disease. Poor support at home.    #Panic disorder  On home venlafaxine 300 qd, clonazepam 1mg 2-3 tabs qhs PRN  - C/w home venlafaxine, clonazepam PRN Sees a "psychopharmacologist" who she says is "not helping her anymore". Sees psychiatrist Dr. Moscoso. On Lithium 150 qd, topiramate 75 qhs, Welbutrin 300 qd,  trazodone 50 1-3 tabs qhs PRN.  - C/w home Lithium, topiramate, Welbutrin, Trazodone PRN  - F/u psych consult for worsening of OCD, panic attacks, and bipolar disease. Poor support at home.    #Panic disorder  On home venlafaxine 300 qd, seroquel 50 qhs and 25 qd PRN, clonazepam 1mg 2-3 tabs qhs PRN  - C/w home venlafaxine, seroquel, and clonazepam PRN Sees a "psychopharmacologist" who she says is "not helping her anymore". Sees psychiatrist Dr. Moscoso. On Lithium 150 qd, topiramate 75 qhs, Welbutrin 300 qd,  trazodone 50 1-3 tabs qhs PRN.  - C/w home Lithium, topiramate, Welbutrin, Trazodone PRN  - F/u psych consult for worsening of OCD, panic attacks, and bipolar disease. Poor support at home.    #Panic disorder  On home venlafaxine 300 qd, seroquel 50 qhs and 25 BID PRN, clonazepam 1mg 2-3 tabs qhs PRN  - C/w home venlafaxine, seroquel, and clonazepam PRN Sees a "psychopharmacologist" who she says is "not helping her anymore". Sees psychiatrist Dr. Moscoso. On Lithium 150 qd, topiramate 75 qhs, Welbutrin 300 qd,  trazodone 50 1-3 tabs qhs PRN.  - C/w home Lithium, topiramate, Welbutrin, Trazodone PRN  - F/u Lithium level  - F/u psych consult for worsening of OCD, panic attacks, and bipolar disease. Poor support at home.    #Panic disorder  On home venlafaxine 300 qd, seroquel 50 qhs and 25 BID PRN, clonazepam 1mg 2-3 tabs qhs PRN  - C/w home venlafaxine, seroquel, and clonazepam PRN

## 2022-03-28 NOTE — BH CONSULTATION LIAISON ASSESSMENT NOTE - NSBHREFERLPTEAMNAME_PSY_A_CORE_FT
Dr. Henny Saleh [phone]- pt in ER admitted to Medicine for anemia and transfusion, with hx excoriation and they seek med reccs

## 2022-03-28 NOTE — ED ADULT NURSE REASSESSMENT NOTE - NS ED NURSE REASSESS COMMENT FT1
Patient assigned bed after having previous one removed. Report given, patient in no obvious distress. Denies any pain at this moment. Pt awaiting blood transfusion, delayed d/t complicated antigens. Endorsed to 7 Uris RN

## 2022-03-28 NOTE — BH CONSULTATION LIAISON ASSESSMENT NOTE - HPI (INCLUDE ILLNESS QUALITY, SEVERITY, DURATION, TIMING, CONTEXT, MODIFYING FACTORS, ASSOCIATED SIGNS AND SYMPTOMS)
Patient is a 71 year old single  female, domiciled alone, retired classical cellist/teacher with a hx of "agitated depression", excoriation disorder, HTN, pre DM (on Metformin), aortic stenosis, hypothyroidism (on synthroid and cytomel), hx of right hip replacement c/b infection, Afib, and DVT (previously on Eliquis but d/c-ed i/s/o anemia), anemia needing transufions, chronic LLE cellulitis who presented today sent from Dr office for low Hgb.  Pt being admitted to medicine for anemia management.    Per Medicine team, pt tearful, difficult to redirect, has excoriations, is unsatisfied with her outpt psychaitrist.  No SI, agitation, or dangerousness.    Pt assessed at bedside in ER.  She was alert, attentive, described feeling "the worst depression" for the past year, no recent changes or worsening.  She reports low mood, multiple hours a day tearful, denies any SI.  No elicited/endorsed sx c/w manic episode or psychosis.  She required much redirection to answer questions as she became tearful due to feeling overwhelmed being in the ER.  Skin scratching is soothing and used as coping skill.  Does not describe compulsion.  Pays for private health aide, reports dissatisfaction with [employer] health staff.  Pt has no therapist and would like one.  Interview ended as pt was too overwhelmed to continue.    She Confirmed the following medications:  effexor XR 300mg PO qAM  methylphenidate 55mg -- takes a quarter tab approx 13mg PO qAM  wellbutrin XR 300mg PO qAM  Lithium 150mg PO qHS  metformin 1500mg PO qAM  clonazepam has been taking 4mg PO qHS for sleep, more than prescribed  seroquel 75mg PO qHS  topamax 75mg PO qHS  melatonin 20mg PO qHS    Reference #: [id number]   Hx stimulants, benzodiazepine , opiate, hypnotic classes—most recently methylphenidate ER 54mg #30 on 3/11/22, clonazepam 1mg #90 on 3/4/22.

## 2022-03-28 NOTE — ED PROVIDER NOTE - CLINICAL SUMMARY MEDICAL DECISION MAKING FREE TEXT BOX
70 y/o F presents for HGB on outpatient labs of 6.9, reports generalized fatigue but no chest pain, SOB. VSS. Will repeat labs and transfuse as needed.

## 2022-03-28 NOTE — DISCHARGE NOTE PROVIDER - CARE PROVIDERS DIRECT ADDRESSES
[email or web address],DirectAddress_[email or web address]

## 2022-03-28 NOTE — ED PROVIDER NOTE - ATTENDING CONTRIBUTION TO CARE
I discussed the plan of care of the patient directly with the PA and examined the patient while in the Emergency Department. I agree with the HPI and PE as documented by the PA.  Pt is a 72yo f, h/o htn, bpd, pre-dm, chronic cellulitis of lower ext, h/o skin picking, afib not on ac, who p/w c/o anemia noted on labwork last week (hg 6.9). Pt receives iron infusions. + sob and fatigue. No cp, syncope. No hematemesis, melena, bloody stools. AVSS. PE as above. Abd exam benign. + s1, s2, rrr. Lungs. Plan for labs including cbc, coags, t&s. Will transfuse prbc if hg < 7.

## 2022-03-28 NOTE — PATIENT PROFILE ADULT - HOME ACCESSIBILITY CONCERNS
pt to f/u with Dr. Alvarado as OP.  h/o childhood innoculation so will have positive TBT skin test.  no evidence of infection on CXR.
none

## 2022-03-28 NOTE — BH CONSULTATION LIAISON ASSESSMENT NOTE - CURRENT MEDICATION
MEDICATIONS  (STANDING):  buPROPion XL (24-Hour) . 300 milliGRAM(s) Oral daily  dextrose 5%. 1000 milliLiter(s) (50 mL/Hr) IV Continuous <Continuous>  dextrose 5%. 1000 milliLiter(s) (100 mL/Hr) IV Continuous <Continuous>  dextrose 50% Injectable 25 Gram(s) IV Push once  dextrose 50% Injectable 12.5 Gram(s) IV Push once  dextrose 50% Injectable 25 Gram(s) IV Push once  glucagon  Injectable 1 milliGRAM(s) IntraMuscular once  insulin lispro (ADMELOG) corrective regimen sliding scale   SubCutaneous Before meals and at bedtime  levothyroxine 112 MICROGram(s) Oral daily  liothyronine 12.5 MICROGram(s) Oral daily  lithium 150 milliGRAM(s) Oral daily  methylphenidate ER (CONCERTA) 54 milliGRAM(s) Oral daily  polyethylene glycol 3350 17 Gram(s) Oral daily  QUEtiapine 50 milliGRAM(s) Oral at bedtime  topiramate 75 milliGRAM(s) Oral at bedtime    MEDICATIONS  (PRN):  clonazePAM  Tablet 3 milliGRAM(s) Oral at bedtime PRN insomnia  dextrose Oral Gel 15 Gram(s) Oral once PRN Blood Glucose LESS THAN 70 milliGRAM(s)/deciliter  QUEtiapine 25 milliGRAM(s) Oral every 12 hours PRN agitation and anxiety  traZODone 100 milliGRAM(s) Oral at bedtime PRN insomnia

## 2022-03-28 NOTE — H&P ADULT - PROBLEM SELECTOR PLAN 8
Per chart review, prior hx of pre-DM, prior a1c in Jan was 4.8 however, although this could be falsely low given her anemia.  - F/u a1c this admission. Per chart review, prior hx of pre-DM, prior a1c in Jan was 4.8 however, although this could be falsely low given her anemia. Takes home metformin 500 BID  - F/u a1c this admission.  - low dose ISS, FSG QID  - Can resume metformin 500 BID and f/u w/ outpatient PCP as necessary.
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
## 2022-03-28 NOTE — DISCHARGE NOTE PROVIDER - NSDCFUADDAPPT_GEN_ALL_CORE_FT
(1) Please follow up with your primary care provider, Dr. Larsen, within 2 weeks of discharge.    (2) Please follow up with Dr. Watkins, your hematologist, on a routine basis.    (3) Please follow up with Dr. Moscoso, your psychiatrist, within 2 weeks of discharge.    (4) Please follow up with Dr. Booker, your cardiologist, on a routine basis.
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
## 2022-03-28 NOTE — ED ADULT NURSE NOTE - OBJECTIVE STATEMENT
Patient referred from MD office for 2u of PRBC d/t Hgb of 6.9. Patient states she had blood drawn Patient referred from MD office for 2u after having blood drawn last week showing Hgb of 6.9. Patient endorsing worsening generalized weakness and . Denies cp, sob, abd pain, nausea, vomiting, diarrhea. Pt has had hx of prior blood transfusions and receives regular Fe infusion.

## 2022-03-28 NOTE — H&P ADULT - PROBLEM SELECTOR PLAN 1
Hx of WILLY. No overt signs of bleeding.  - F/u iron studies, ferritin this admission  - Transfer to > Hgb 7 Hx of WILLY. No overt signs of bleeding. Last had a colonoscopy 2 years ago, which was normal. Pt recently started seeing heme/onc Dr. Watkins. Taking iron supplements. Reports a history of blood transfusions in the past, both at [facility] and [facility]. Hemoglobin of 6.3 on admission s/p 1 unit of pRBCs.  - Continue with home iron medication  - F/u iron studies, ferritin, transferrin this admission  - F/u retic, haptoglobin, LDH  - Maintain active T&S, transfuse to > Hgb 7  - F/u outpatient with Dr. Watkins Hx of WILLY. MCV 93 which could be 2/2 reticulocytosis. INR elevated, but no overt signs of bleeding. Last had a colonoscopy 2 years ago, which was normal. Pt recently started seeing heme/onc Dr. Watkins. Taking iron supplements. Reports a history of blood transfusions in the past, both at [facility] and [facility]. Hemoglobin of 6.3 on admission s/p 1 unit of pRBCs.  - Continue with home iron medication  - F/u iron studies, ferritin, transferrin this admission  - F/u retic, haptoglobin, LDH  - Maintain active T&S, transfuse to > Hgb 7  - F/u outpatient with Dr. Watkins    #Elevated INR  PT and PTT both elevated. Similar to prior Jan admission. LFTs within limits. No overt signs of bleeding. May be 2/2 vitamin K deficiency vs inherited fibrinogen deficiency  - Consider vitamin supplementation on discharge and f/u outpatient PCP. Hx of WILLY which began suddenly 1 year prior. Reports a history of blood transfusions in the past, both at [facility] and [facility]. MCV 93 which could be 2/2 reticulocytosis. INR elevated, but no overt signs of bleeding. Colonoscopy 2 years ago reportedly normal. Taking iron supplements.  Hemoglobin of 6.3 on admission s/p 1 unit of pRBCs.  - Continue with home iron medication  - F/u iron studies, ferritin, transferrin this admission  - F/u retic, haptoglobin, LDH  - Maintain active T&S, transfuse to > Hgb 7  - F/u outpatient with Dr. Watkins    #Elevated INR  PT and PTT both elevated. Similar to prior Jan admission. LFTs within limits. No overt signs of bleeding. May be 2/2 vitamin K deficiency vs inherited fibrinogen deficiency  - Consider vitamin supplementation on discharge and f/u outpatient PCP.

## 2022-03-28 NOTE — H&P ADULT - PROBLEM SELECTOR PLAN 6
Pt on home Liothyramine 12.5 qAM, synthroid 112 mcg qd  - C/w home meds Pt on home Liothyronine 12.5 qAM, synthroid 112 mcg qd  - C/w home meds Most recently performed echo in 8/21 demonstrating mild aortic stenosis and moderate LA dilation. Follows Dr. Booker of [facility]. Systolic III/VI murmur present.  - F/u w/ cardiology Dr. Booker outpatient

## 2022-03-28 NOTE — H&P ADULT - NSHPSOCIALHISTORY_GEN_ALL_CORE
Lives alone, has private HHA 20 hrs per week, ambulates with a rollator, reports occasional alcohol use, denies tobacco, or illicit substance use.

## 2022-03-28 NOTE — DISCHARGE NOTE PROVIDER - CARE PROVIDER_API CALL
Jairo Larsen (DO)  [address]  [address], [place] [address]  Phone: [phone]  Fax: [phone]  Established Patient  Follow Up Time: 2 weeks    Anamika Watkins  [address] [place] [address]  Phone: [phone]  Fax: (   )    -  Established Patient  Follow Up Time: Routine    Rohan Moscoso  [address] [place] [id number]  Phone: [phone]  Fax: (   )    -  Established Patient  Follow Up Time: 2 weeks    Viv Booker)  Cardiovascular Disease; Internal Medicine  [address], [address]  [place] [address]  Phone: [phone]  Fax: [phone]  Established Patient  Follow Up Time: Routine

## 2022-03-28 NOTE — DISCHARGE NOTE PROVIDER - NSDCCPTREATMENT_GEN_ALL_CORE_FT
PRINCIPAL PROCEDURE  Procedure: XR chest, 1 view  Findings and Treatment:        PRINCIPAL PROCEDURE  Procedure: XR chest, 1 view  Findings and Treatment: History: Shortness of breath  Prior study: Chest radiograph from 1/4/22.  Findings:  The lungs are clear.  There are no pleural effusions.  The cardiomediastinal silhouette, bones and soft tissues are unremarkable. Surgical clips in the left axillary region.  Impression:  No radiographic evidence of active cardiopulmonary disease.       PRINCIPAL PROCEDURE  Procedure: XR chest, 1 view  Findings and Treatment: Date of exam: 3/28/2022  Findings:   1. The lungs are clear.  2. There are no pleural effusions.  3. The cardiomediastinal silhouette, bones and soft tissues are unremarkable.  4. Surgical clips in the left axillary region.  5. Impression: No radiographic evidence of active cardiopulmonary disease.

## 2022-03-28 NOTE — H&P ADULT - ATTENDING COMMENTS
Psych consulted for polyphaymacy  Monitor Hb/hct and transfuse as needed  PT eval and discharge planning

## 2022-03-28 NOTE — H&P ADULT - NSHPLABSRESULTS_GEN_ALL_CORE
LABS:                          6.3    9.89  )-----------( 555      ( 28 Mar 2022 14:13 )             22.7     03-28    140  |  106  |  8   ----------------------------<  101<H>  3.9   |  22  |  0.74    Ca    8.7      28 Mar 2022 14:13    TPro  6.3  /  Alb  3.6  /  TBili  0.2  /  DBili  x   /  AST  13  /  ALT  11  /  AlkPhos  106  03-28    PT/INR - ( 28 Mar 2022 14:13 )   PT: 16.7 sec;   INR: 1.40          PTT - ( 28 Mar 2022 14:13 )  PTT:37.9 sec    IMAGING:

## 2022-03-28 NOTE — BH CONSULTATION LIAISON ASSESSMENT NOTE - RISK ASSESSMENT
Suicide Risk Assessment: Low acute risk  Assessed at low risk for suicide, at baseline. No current or past suicide attempts, no current SI or recent SI during this period, although has had SI in the past. Looking forward to going home, has support from HHA.    Risk factors: Hx of treatment-resistant depression, SI, physical health problems, PD traits  Protective factors: Social support, ongoing  care, cats

## 2022-03-28 NOTE — H&P ADULT - PROBLEM SELECTOR PLAN 10
F: tolerating PO, no IVF  E: replete K<4, Mg<2  N: Low sodium    VTE Prophylaxis: Hold given anemia. Hold SCDs given cellulitis.  GI: Not needed  C: Full Code  D: RMF
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
## 2022-03-29 ENCOUNTER — TRANSCRIPTION ENCOUNTER (OUTPATIENT)
Age: 72
End: 2022-03-29

## 2022-03-29 VITALS
RESPIRATION RATE: 17 BRPM | OXYGEN SATURATION: 97 % | HEART RATE: 80 BPM | DIASTOLIC BLOOD PRESSURE: 72 MMHG | TEMPERATURE: 98 F | SYSTOLIC BLOOD PRESSURE: 124 MMHG

## 2022-03-29 LAB
A1C WITH ESTIMATED AVERAGE GLUCOSE RESULT: 4.8 % — SIGNIFICANT CHANGE UP (ref 4–5.6)
ALBUMIN SERPL ELPH-MCNC: 3.3 G/DL — SIGNIFICANT CHANGE UP (ref 3.3–5)
ALP SERPL-CCNC: 89 U/L — SIGNIFICANT CHANGE UP (ref 40–120)
ALT FLD-CCNC: 11 U/L — SIGNIFICANT CHANGE UP (ref 10–45)
ANION GAP SERPL CALC-SCNC: 11 MMOL/L — SIGNIFICANT CHANGE UP (ref 5–17)
AST SERPL-CCNC: 17 U/L — SIGNIFICANT CHANGE UP (ref 10–40)
BASOPHILS # BLD AUTO: 0.01 K/UL — SIGNIFICANT CHANGE UP (ref 0–0.2)
BASOPHILS NFR BLD AUTO: 0.2 % — SIGNIFICANT CHANGE UP (ref 0–2)
BILIRUB SERPL-MCNC: 0.2 MG/DL — SIGNIFICANT CHANGE UP (ref 0.2–1.2)
BUN SERPL-MCNC: 8 MG/DL — SIGNIFICANT CHANGE UP (ref 7–23)
CALCIUM SERPL-MCNC: 8.3 MG/DL — LOW (ref 8.4–10.5)
CHLORIDE SERPL-SCNC: 108 MMOL/L — SIGNIFICANT CHANGE UP (ref 96–108)
CO2 SERPL-SCNC: 21 MMOL/L — LOW (ref 22–31)
CREAT SERPL-MCNC: 0.71 MG/DL — SIGNIFICANT CHANGE UP (ref 0.5–1.3)
EGFR: 91 ML/MIN/1.73M2 — SIGNIFICANT CHANGE UP
EOSINOPHIL # BLD AUTO: 0.14 K/UL — SIGNIFICANT CHANGE UP (ref 0–0.5)
EOSINOPHIL NFR BLD AUTO: 2.2 % — SIGNIFICANT CHANGE UP (ref 0–6)
ESTIMATED AVERAGE GLUCOSE: 91 MG/DL — SIGNIFICANT CHANGE UP (ref 68–114)
GLUCOSE BLDC GLUCOMTR-MCNC: 108 MG/DL — HIGH (ref 70–99)
GLUCOSE BLDC GLUCOMTR-MCNC: 96 MG/DL — SIGNIFICANT CHANGE UP (ref 70–99)
GLUCOSE SERPL-MCNC: 166 MG/DL — HIGH (ref 70–99)
HCT VFR BLD CALC: 23.4 % — LOW (ref 34.5–45)
HCT VFR BLD CALC: 28.4 % — LOW (ref 34.5–45)
HGB BLD-MCNC: 6.7 G/DL — CRITICAL LOW (ref 11.5–15.5)
HGB BLD-MCNC: 8 G/DL — LOW (ref 11.5–15.5)
IMM GRANULOCYTES NFR BLD AUTO: 0.5 % — SIGNIFICANT CHANGE UP (ref 0–1.5)
LYMPHOCYTES # BLD AUTO: 1.03 K/UL — SIGNIFICANT CHANGE UP (ref 1–3.3)
LYMPHOCYTES # BLD AUTO: 16.1 % — SIGNIFICANT CHANGE UP (ref 13–44)
MAGNESIUM SERPL-MCNC: 2.4 MG/DL — SIGNIFICANT CHANGE UP (ref 1.6–2.6)
MCHC RBC-ENTMCNC: 25.7 PG — LOW (ref 27–34)
MCHC RBC-ENTMCNC: 26.7 PG — LOW (ref 27–34)
MCHC RBC-ENTMCNC: 28.2 GM/DL — LOW (ref 32–36)
MCHC RBC-ENTMCNC: 28.6 GM/DL — LOW (ref 32–36)
MCV RBC AUTO: 91.3 FL — SIGNIFICANT CHANGE UP (ref 80–100)
MCV RBC AUTO: 93.2 FL — SIGNIFICANT CHANGE UP (ref 80–100)
MONOCYTES # BLD AUTO: 0.56 K/UL — SIGNIFICANT CHANGE UP (ref 0–0.9)
MONOCYTES NFR BLD AUTO: 8.8 % — SIGNIFICANT CHANGE UP (ref 2–14)
NEUTROPHILS # BLD AUTO: 4.63 K/UL — SIGNIFICANT CHANGE UP (ref 1.8–7.4)
NEUTROPHILS NFR BLD AUTO: 72.2 % — SIGNIFICANT CHANGE UP (ref 43–77)
NRBC # BLD: 0 /100 WBCS — SIGNIFICANT CHANGE UP (ref 0–0)
NRBC # BLD: 0 /100 WBCS — SIGNIFICANT CHANGE UP (ref 0–0)
PHOSPHATE SERPL-MCNC: 2.8 MG/DL — SIGNIFICANT CHANGE UP (ref 2.5–4.5)
PLATELET # BLD AUTO: 437 K/UL — HIGH (ref 150–400)
PLATELET # BLD AUTO: 484 K/UL — HIGH (ref 150–400)
POTASSIUM SERPL-MCNC: 3.6 MMOL/L — SIGNIFICANT CHANGE UP (ref 3.5–5.3)
POTASSIUM SERPL-SCNC: 3.6 MMOL/L — SIGNIFICANT CHANGE UP (ref 3.5–5.3)
PROT SERPL-MCNC: 6.3 G/DL — SIGNIFICANT CHANGE UP (ref 6–8.3)
RBC # BLD: 2.51 M/UL — LOW (ref 3.8–5.2)
RBC # BLD: 3.11 M/UL — LOW (ref 3.8–5.2)
RBC # FLD: 19.4 % — HIGH (ref 10.3–14.5)
RBC # FLD: 19.9 % — HIGH (ref 10.3–14.5)
SODIUM SERPL-SCNC: 140 MMOL/L — SIGNIFICANT CHANGE UP (ref 135–145)
WBC # BLD: 6.4 K/UL — SIGNIFICANT CHANGE UP (ref 3.8–10.5)
WBC # BLD: 7.2 K/UL — SIGNIFICANT CHANGE UP (ref 3.8–10.5)
WBC # FLD AUTO: 6.4 K/UL — SIGNIFICANT CHANGE UP (ref 3.8–10.5)
WBC # FLD AUTO: 7.2 K/UL — SIGNIFICANT CHANGE UP (ref 3.8–10.5)

## 2022-03-29 PROCEDURE — 84466 ASSAY OF TRANSFERRIN: CPT

## 2022-03-29 PROCEDURE — 86900 BLOOD TYPING SEROLOGIC ABO: CPT

## 2022-03-29 PROCEDURE — 36430 TRANSFUSION BLD/BLD COMPNT: CPT

## 2022-03-29 PROCEDURE — 86880 COOMBS TEST DIRECT: CPT

## 2022-03-29 PROCEDURE — 82962 GLUCOSE BLOOD TEST: CPT

## 2022-03-29 PROCEDURE — 83540 ASSAY OF IRON: CPT

## 2022-03-29 PROCEDURE — 86901 BLOOD TYPING SEROLOGIC RH(D): CPT

## 2022-03-29 PROCEDURE — 99239 HOSP IP/OBS DSCHRG MGMT >30: CPT

## 2022-03-29 PROCEDURE — 86850 RBC ANTIBODY SCREEN: CPT

## 2022-03-29 PROCEDURE — 85027 COMPLETE CBC AUTOMATED: CPT

## 2022-03-29 PROCEDURE — 71045 X-RAY EXAM CHEST 1 VIEW: CPT

## 2022-03-29 PROCEDURE — 86902 BLOOD TYPE ANTIGEN DONOR EA: CPT

## 2022-03-29 PROCEDURE — 83615 LACTATE (LD) (LDH) ENZYME: CPT

## 2022-03-29 PROCEDURE — 80053 COMPREHEN METABOLIC PANEL: CPT

## 2022-03-29 PROCEDURE — 85025 COMPLETE CBC W/AUTO DIFF WBC: CPT

## 2022-03-29 PROCEDURE — 83735 ASSAY OF MAGNESIUM: CPT

## 2022-03-29 PROCEDURE — 83036 HEMOGLOBIN GLYCOSYLATED A1C: CPT

## 2022-03-29 PROCEDURE — 82728 ASSAY OF FERRITIN: CPT

## 2022-03-29 PROCEDURE — 86870 RBC ANTIBODY IDENTIFICATION: CPT

## 2022-03-29 PROCEDURE — 85045 AUTOMATED RETICULOCYTE COUNT: CPT

## 2022-03-29 PROCEDURE — 80178 ASSAY OF LITHIUM: CPT

## 2022-03-29 PROCEDURE — 83010 ASSAY OF HAPTOGLOBIN QUANT: CPT

## 2022-03-29 PROCEDURE — 99285 EMERGENCY DEPT VISIT HI MDM: CPT

## 2022-03-29 PROCEDURE — 85730 THROMBOPLASTIN TIME PARTIAL: CPT

## 2022-03-29 PROCEDURE — 87635 SARS-COV-2 COVID-19 AMP PRB: CPT

## 2022-03-29 PROCEDURE — 84100 ASSAY OF PHOSPHORUS: CPT

## 2022-03-29 PROCEDURE — 83550 IRON BINDING TEST: CPT

## 2022-03-29 PROCEDURE — 85610 PROTHROMBIN TIME: CPT

## 2022-03-29 PROCEDURE — 36415 COLL VENOUS BLD VENIPUNCTURE: CPT

## 2022-03-29 PROCEDURE — 86922 COMPATIBILITY TEST ANTIGLOB: CPT

## 2022-03-29 PROCEDURE — P9016: CPT

## 2022-03-29 RX ORDER — METHYLPHENIDATE HCL 5 MG
1 TABLET ORAL
Qty: 0 | Refills: 0 | DISCHARGE

## 2022-03-29 RX ORDER — LANOLIN ALCOHOL/MO/W.PET/CERES
3 CREAM (GRAM) TOPICAL AT BEDTIME
Refills: 0 | Status: DISCONTINUED | OUTPATIENT
Start: 2022-03-29 | End: 2022-03-29

## 2022-03-29 RX ORDER — IRON SUCROSE 20 MG/ML
200 INJECTION, SOLUTION INTRAVENOUS ONCE
Refills: 0 | Status: COMPLETED | OUTPATIENT
Start: 2022-03-29 | End: 2022-03-29

## 2022-03-29 RX ADMIN — Medication 300 MILLIGRAM(S): at 07:09

## 2022-03-29 RX ADMIN — LIOTHYRONINE SODIUM 12.5 MICROGRAM(S): 25 TABLET ORAL at 08:45

## 2022-03-29 RX ADMIN — BUPROPION HYDROCHLORIDE 300 MILLIGRAM(S): 150 TABLET, EXTENDED RELEASE ORAL at 12:06

## 2022-03-29 RX ADMIN — Medication 112 MICROGRAM(S): at 07:09

## 2022-03-29 NOTE — DISCHARGE NOTE NURSING/CASE MANAGEMENT/SOCIAL WORK - NSDCPEFALRISK_GEN_ALL_CORE
For information on Fall & Injury Prevention, visit: [email or web address] OR  [email or web address] OR  https://www.cdc.gov/steadi/patient.html

## 2022-03-29 NOTE — DISCHARGE NOTE NURSING/CASE MANAGEMENT/SOCIAL WORK - PATIENT PORTAL LINK FT
You can access the FollowMyHealth Patient Portal offered by [facility] by registering at the following website: [email or web address]. By joining [employer]’s FollowMyHealth portal, you will also be able to view your health information using other applications (apps) compatible with our system.

## 2022-03-29 NOTE — CHART NOTE - NSCHARTNOTEFT_GEN_A_CORE
Writer attempted to see patient with the intention of further discussing her mood, past psych history (including contact information of current psychiatrist), and medication regimen. Patient expressed she intended to go home that afternoon whether by discharge or AMA. Stated she had to go take care of her cats and intended to pursue care elsewhere. Patient declined further discussion and ended interview.
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
## 2022-04-05 DIAGNOSIS — Z79.01 LONG TERM (CURRENT) USE OF ANTICOAGULANTS: ICD-10-CM

## 2022-04-05 DIAGNOSIS — I35.0 NONRHEUMATIC AORTIC (VALVE) STENOSIS: ICD-10-CM

## 2022-04-05 DIAGNOSIS — L03.119 CELLULITIS OF UNSPECIFIED PART OF LIMB: ICD-10-CM

## 2022-04-05 DIAGNOSIS — F42.4 EXCORIATION (SKIN-PICKING) DISORDER: ICD-10-CM

## 2022-04-05 DIAGNOSIS — F43.10 POST-TRAUMATIC STRESS DISORDER, UNSPECIFIED: ICD-10-CM

## 2022-04-05 DIAGNOSIS — F42.9 OBSESSIVE-COMPULSIVE DISORDER, UNSPECIFIED: ICD-10-CM

## 2022-04-05 DIAGNOSIS — D64.9 ANEMIA, UNSPECIFIED: ICD-10-CM

## 2022-04-05 DIAGNOSIS — F31.9 BIPOLAR DISORDER, UNSPECIFIED: ICD-10-CM

## 2022-04-05 DIAGNOSIS — R70.1 ABNORMAL PLASMA VISCOSITY: ICD-10-CM

## 2022-04-05 DIAGNOSIS — F60.3 BORDERLINE PERSONALITY DISORDER: ICD-10-CM

## 2022-04-05 DIAGNOSIS — R79.1 ABNORMAL COAGULATION PROFILE: ICD-10-CM

## 2022-04-05 DIAGNOSIS — D50.0 IRON DEFICIENCY ANEMIA SECONDARY TO BLOOD LOSS (CHRONIC): ICD-10-CM

## 2022-04-05 DIAGNOSIS — E56.1 DEFICIENCY OF VITAMIN K: ICD-10-CM

## 2022-04-05 DIAGNOSIS — Z96.651 PRESENCE OF RIGHT ARTIFICIAL KNEE JOINT: ICD-10-CM

## 2022-04-05 DIAGNOSIS — F41.0 PANIC DISORDER [EPISODIC PAROXYSMAL ANXIETY]: ICD-10-CM

## 2022-04-05 DIAGNOSIS — I10 ESSENTIAL (PRIMARY) HYPERTENSION: ICD-10-CM

## 2022-04-05 DIAGNOSIS — Z79.84 LONG TERM (CURRENT) USE OF ORAL HYPOGLYCEMIC DRUGS: ICD-10-CM

## 2022-04-05 DIAGNOSIS — Z62.819 PERSONAL HISTORY OF UNSPECIFIED ABUSE IN CHILDHOOD: ICD-10-CM

## 2022-04-05 DIAGNOSIS — I48.0 PAROXYSMAL ATRIAL FIBRILLATION: ICD-10-CM

## 2022-04-05 DIAGNOSIS — E03.9 HYPOTHYROIDISM, UNSPECIFIED: ICD-10-CM

## 2022-04-05 DIAGNOSIS — F41.9 ANXIETY DISORDER, UNSPECIFIED: ICD-10-CM

## 2022-04-05 DIAGNOSIS — R73.03 PREDIABETES: ICD-10-CM

## 2022-05-12 NOTE — PROGRESS NOTE ADULT - PROBLEM SELECTOR PROBLEM 2
MEDICARE WELLNESS VISIT + NOTE    CHIEF COMPLAINT:  Brendan Katz presents for his Subsequent Annual Medicare Wellness Visit.   His additional complaints or concerns are addressed below.     Patient Care Team:  Jodee Desai MD as PCP - General (Family Practice)  Florin Trejo MD as Cardiologist (Cardiovascular Disease)  Sam Baig MD as Pulmonary Medicine (Internal Medicine - Pulmonary Disease)  Lissett Gama, RN as Registered Nurse (Cardiology)        Patient Active Problem List   Diagnosis   • Coronary artery disease   • Essential hypertension, benign   • Atrial fibrillation (CMS/[facility])   • SOB (shortness of breath)   • CHANI on CPAP   • Morbid obesity (CMS/[facility])   • Hypoxemia requiring supplemental oxygen   • Moderate persistent asthma without complication   • Body mass index (BMI) 45.0-49.9, adult   • Acute on chronic right-sided heart failure (CMS/[facility])   • Acute on chronic diastolic congestive heart failure (CMS/[facility])   • Morbid obesity with BMI of 40.0-44.9, adult (CMS/[facility])   • DMII (diabetes mellitus, type 2) (CMS/[facility])   • Pulmonary hypertension, moderate to severe (CMS/[facility])   • Chronic respiratory failure (CMS/[facility])         Past Medical History:   Diagnosis Date   • Acute dyspnea 4/11/2014   • Atrial fibrillation (CMS/[facility])     on Xarelto   • Colon polyp    • Congestive heart failure (CMS/[facility])     with lower extremity edema   • Coronary artery disease 2014    s/p stents   • DMII (diabetes mellitus, type 2) (CMS/[facility]) 6/11/2018   • MARTINEZ (dyspnea on exertion)    • Dyslipidemia    • Dyspnea     chronic shortness of breath   • Essential hypertension, benign    • [place] (hard of hearing) 2019    Bilateral   • Hypertension    • Internal hemorrhoids    • Morbid obesity with BMI of 40.0-44.9, adult (CMS/[facility]) 2020    BMI 42.75   • Obstructive sleep apnea     compliant to C Pap   • Osteoarthritis    • Pneumonia 2017    hospitalized   • Pulmonary HTN (CMS/HCC) 2018    severe   • RAD (reactive airway disease)    • 
Sensorineural hearing loss (SNHL)     bilateral   • Sepsis, unspecified 8/10/2017         Past Surgical History:   Procedure Laterality Date   • Colonoscopy diagnostic  02/19/2007   • Coronary angioplasty with stent placement  3/3/14    JHONATAN distal RCA, Mid RCA, proximal OM1   • Echo m-mode/2d/doppler (routine)  4/11/2014   • Hernia repair  2004   • Right heart cath  01/21/2020   • Right heart cath  12/29/2020   • Right heart cath  10/25/2021   • Stress test  02/11/2014         Social History     Tobacco Use   • Smoking status: Never Smoker   • Smokeless tobacco: Never Used   Substance Use Topics   • Alcohol use: Not Currently     Comment: last  2018 (quit when began Remodulin gtt)   • Drug use: No     Family History   Problem Relation Age of Onset   • Cancer Mother         thoracic? tumor under rib   • Cancer Father         brain cancer   • COPD Father    • Eczema Sister    • COPD Sister    • Cancer Brother         brain cancer   • Early death Brother 46        brain cancer   • Substance Abuse Brother    • Psychiatric Brother 43        Bipolar   • Early death Brother    • Stroke/TIA Sister         2 sisters had minor strokes?         Current Outpatient Medications   Medication Sig Dispense Refill   • metOLazone (ZAROXOLYN) 2.5 MG tablet Take 1 tablet by mouth as needed (for weight gain/swelling that does not respond to extra torsemide). 15 tablet 0   • allopurinol (ZYLOPRIM) 100 MG tablet Take 1 tablet by mouth daily. If not helping with pain, may increase to 200  Mg daily next week but notify clinic of increase. 90 tablet 0   • atorvastatin (LIPITOR) 20 MG tablet Take 1 tablet by mouth daily. 90 tablet 3   • rivaroxaban (Xarelto) 20 MG Tab Take 1 tablet by mouth daily (with dinner). 90 tablet 3   • torsemide (DEMADEX) 20 MG tablet Take 3 tablets by mouth as directed. Take three tablets every other day, alternating with taking 2 tablets every other day. 225 tablet 1   • potassium CHLORIDE (KLOR-CON M) 20 MEQ kadi ER 
tablet Take 3 tablets by mouth as directed. Take 3 tablets every other day, alternating with taking 2 tablets every other day and extra when instructed per clinic. 225 tablet 1   • Fluticasone-Umeclidin-Vilant 200-62.5-25 MCG/INH AEROSOL POWDER, BREATH ACTIVATED Inhale 1 puff into the lungs daily. 1 each 11   • treprostinil (Remodulin) 100 MG/20ML Solution injection (5 mg/mL) Decrease IV Remodulin by 1 ng/kg/min weekly to a new goal dose of 30 ng/kg/min. (Patient taking differently: 30 ng/kg/min. Decrease IV Remodulin by 1 ng/kg/min weekly to a new goal dose of 30 ng/kg/min.) 6 mL 11   • Benralizumab 30 MG/ML Solution Auto-injector Inject 1 mL into the skin every 8 weeks. subcutaneous 1 mL 8   • Tadalafil, PAH, 20 MG Tab Take 40 mg by mouth daily. 60 tablet 11   • albuterol (ACCUNEB) 1.25 MG/3ML nebulizer solution Take 3 mLs by nebulization every 6 hours as needed for Wheezing or Shortness of Breath. 90 mL 3   • COMPRESSION STOCKING 15-20  2 each 1   • ProAir  (90 Base) MCG/ACT inhaler INHALE 2 PUFFS BY MOUTH EVERY 4 HOURS AS NEEDED FOR SHORTNESS OF BREATH FOR WHEEZING 9 g 1   • folic acid (FOLATE) 400 MCG tablet Take 400 mcg by mouth daily.     • Coenzyme Q10 200 MG Cap Take 1 capsule by mouth daily.      • cholecalciferol (VITAMIN D3) 1000 units tablet Take 500 Units by mouth daily.      • Omega 3 1000 MG capsule Take 1,000 mg by mouth daily.     • fluticasone (FLONASE) 50 MCG/ACT nasal spray Spray 2 sprays in each nostril daily. 16 g 12   • DISPENSE CPAP supplies. 1 each 0   • aspirin 81 MG chewable tablet Chew 81 mg by mouth daily.     • montelukast (SINGULAIR) 10 MG tablet Take 1 tablet by mouth nightly 30 tablet 5     No current facility-administered medications for this visit.        The following items on the Medicare Health Risk Assessment were found to be positive  11j.) Driven/Ridden in a car without wearing your seatbelt: Sometimes         Vision and Hearing screens: No exam data 
present    Advance Directive:   The patient has the following documents:  Power of  for Health Care  Living Will    Cognitive/Functional Status: Mini-Cog performed with score of 5    Opioid Review: Brendan is not taking opioid medications.    Recent PHQ 2/9 Score:    PHQ 2:  Date Adult PHQ 2 Score Adult PHQ 2 Interpretation   5/5/2022 0 No further screening needed       PHQ 9:  Date Adult PHQ 9 Score Adult PHQ 9 Interpretation   12/7/2020 0 -       DEPRESSION ASSESSMENT/PLAN:  Depression screening is negative no further plan needed.     Body mass index is 42.72 kg/m².    BMI ASSESSMENT/PLAN:  Patient is obese.    30  minutes of physical activity a day        See Patient Instructions section.   Return in about 1 year (around 5/12/2023) for Medicare Wellness Visit, fasting labs prior.      OUTPATIENT PROGRESS NOTE    Subjective   Chief Complaint Medicare Wellness Visit    HPI     Angel is overall doing well. Patient states taking only 100 mg of Allopurinol wasn't relieving his gout symptoms, patient has increased it to 200 mg daily which seemed to help. Patient states he was on this medication in the past at 300 mg daily.    Patient bikes 10 miles per day on a recumbent bike.     Patient's baseline oxygen is 92%.    Patient denies fever, chills, chest pain, shortness of breath, cough, congestion, abdominal pain, vision change, headaches, constipation, diarrhea, urinary incontinence, and blood in stool. No mood concerns.    Medications  Medications were reviewed and updated today.    Histories  I have personally reviewed and updated the patient's past medical, past surgical, family and social histories during today's visit.    Review of Systems:   All 10 points review of system done and negative except those mentioned above in HPI.     Objective   Visit Vitals  /80   Pulse 60   Resp 18   Ht 6' (1.829 m)   Wt (!) 142.9 kg (315 lb)   BMI 42.72 kg/m²     Physical Exam  General appearance: alert, cooperative 
and no distress, morbidly obese  MORBIDLY OBESE   Head: Normocephalic, without obvious abnormality, atraumatic  Eyes: Conjunctivae/sclerae normal. No erythema, edema or exudate.  Ears: normal tympanic membranes and external ear canals both ears  Nose: Nares normal. Septum midline. Mucosa normal. No drainage or sinus tenderness.  Throat: lips, mucosa, and tongue normal; teeth and gums normal  Neck: no adenopathy, no carotid bruit, no thyromegaly.   Back: Back symmetric, no curvature. Range of motion normal. No costovertebral angle tenderness.  Lungs: clear to auscultation bilaterally  Chest wall: no tenderness  Heart: regular rate and rhythm, S1, S2 normal, no murmur.   Abdomen: Soft, non-tender; bowel sounds normal; no masses, no hepatosplenomegaly  Extremities: moderate pitting edema bilaterally.   Skin: Skin color, texture, turgor normal. No rashes or lesions  Lymph nodes: Cervical, supraclavicular, and axillary nodes normal.  Neurologic: Alert and oriented x3, normal strength and tone. Normal symmetric reflexes. Normal coordination and gait    Laboratory  I have reviewed the pertinent laboratory tests. These are the pertinent findings:  · As below in A&P.    Imaging  I have reviewed the pertinent imaging study reports. These are the pertinent findings:  · No imaging.    Assessment & Plan     1. Medicare annual wellness visit, subsequent    Labs discussed.  Next years labs ordered.  HRAs discussed.  Discussed lifestyle modification.   30 min of moderate exercise 5 times per week or 45 min of intensive exercise 3 times per week.   Discussed calcium (1200 mg) and vitamin D (800 International Units) daily.   Discussed regular eye exam and dental visits. Self-skin checks. SPF >50.   Immunizations as per nurse notes.     2. Type 2 diabetes mellitus with hyperosmolarity without coma, without long-term current use of insulin (CMS/[facility])    Controlled, A1C is under goal. Diet controlled. Patient is due for an eye exam, so 
Anemia
recommended to get that done. Diabetic foot exam was done today.     Diabetic Foot Exam Documentation     Normal Bilateral Foot Exam:  Skin integrity is normal. Dorsalis pedis and posterior tibial pulses are present.  Pressure sensation using the [place] monofilament is present.      3. Coronary artery disease involving native coronary artery of native heart without angina pectoris  4. Atrial fibrillation, unspecified type (CMS/HCC)  5. Chronic combined systolic and diastolic heart failure (CMS/HCC)    Closely follows with Cardiology. Continue on Torsemide and Xarelto. Patient takes Zaroxolyn as needed for increased swelling. Continue on Lipitor 20 mg daily.    6. CHANI on CPAP    Continue on CPAP.    7. Moderate persistent asthma without complication  8. Pulmonary hypertension, moderate to severe (CMS/HCC)  9. Chronic obstructive pulmonary disease, unspecified COPD type (CMS/HCC)    Sees Dr. Hearn and Pulmonology in [place]. Continue on the current regimen.    10. Essential hypertension, benign    Controlled, blood pressure is under goal. Stable.    11. Morbid obesity with BMI of 40.0-44.9, adult (CMS/HCC)    Patient is following his own regimen in terms of diet. Patient has seen nutritionist multiple times and goes on recumbent bike on a daily basis. Continue with the same.    12. Thrombocytopenia (CMS/HCC)    Resolved.    13. Stage 3a chronic kidney disease (CMS/HCC)    At baseline. Continue with strict water restriction of 64 oz.    14. Acute gout, unspecified cause, unspecified site    Discussed with the patient to continue with 200 mg of Allopurinol due to his CKD, we will not go higher on the dose. Discussed about low purine diet and recommended to avoid alcohol.     15. Screening for prostate cancer    - PSA; Future    16. Screening for colon cancer    - COLOGUARD      On 5/12/2022, Sarah ABEBE scribed the services personally performed by Jodee Desai MD    The documentation recorded by 
the scribe accurately and completely reflects the service(s) I personally performed and the decisions made by me.       
Anemia

## 2022-07-12 ENCOUNTER — OUTPATIENT (OUTPATIENT)
Dept: OUTPATIENT SERVICES | Facility: HOSPITAL | Age: 72
LOS: 1 days | End: 2022-07-12
Payer: MEDICARE

## 2022-07-12 DIAGNOSIS — D75.839 THROMBOCYTOSIS, UNSPECIFIED: ICD-10-CM

## 2022-07-12 DIAGNOSIS — Z98.890 OTHER SPECIFIED POSTPROCEDURAL STATES: Chronic | ICD-10-CM

## 2022-07-12 DIAGNOSIS — E03.9 HYPOTHYROIDISM, UNSPECIFIED: ICD-10-CM

## 2022-07-12 DIAGNOSIS — D64.9 ANEMIA, UNSPECIFIED: ICD-10-CM

## 2022-07-12 DIAGNOSIS — E11.9 TYPE 2 DIABETES MELLITUS WITHOUT COMPLICATIONS: ICD-10-CM

## 2022-07-12 DIAGNOSIS — Z96.651 PRESENCE OF RIGHT ARTIFICIAL KNEE JOINT: Chronic | ICD-10-CM

## 2022-07-12 PROCEDURE — 86901 BLOOD TYPING SEROLOGIC RH(D): CPT

## 2022-07-12 PROCEDURE — 86900 BLOOD TYPING SEROLOGIC ABO: CPT

## 2022-07-12 PROCEDURE — 86880 COOMBS TEST DIRECT: CPT

## 2022-07-12 PROCEDURE — 86870 RBC ANTIBODY IDENTIFICATION: CPT

## 2022-07-12 PROCEDURE — 86850 RBC ANTIBODY SCREEN: CPT

## 2022-08-22 ENCOUNTER — EMERGENCY (EMERGENCY)
Facility: HOSPITAL | Age: 72
LOS: 1 days | Discharge: HOME | End: 2022-08-22
Attending: STUDENT IN AN ORGANIZED HEALTH CARE EDUCATION/TRAINING PROGRAM | Admitting: STUDENT IN AN ORGANIZED HEALTH CARE EDUCATION/TRAINING PROGRAM
Payer: MEDICARE

## 2022-08-22 VITALS
RESPIRATION RATE: 18 BRPM | SYSTOLIC BLOOD PRESSURE: 120 MMHG | TEMPERATURE: 98 F | OXYGEN SATURATION: 97 % | HEART RATE: 88 BPM | HEIGHT: 63 IN | DIASTOLIC BLOOD PRESSURE: 74 MMHG

## 2022-08-22 VITALS
SYSTOLIC BLOOD PRESSURE: 105 MMHG | DIASTOLIC BLOOD PRESSURE: 62 MMHG | RESPIRATION RATE: 18 BRPM | TEMPERATURE: 98 F | OXYGEN SATURATION: 96 % | HEART RATE: 81 BPM

## 2022-08-22 DIAGNOSIS — Z96.651 PRESENCE OF RIGHT ARTIFICIAL KNEE JOINT: Chronic | ICD-10-CM

## 2022-08-22 DIAGNOSIS — F41.9 ANXIETY DISORDER, UNSPECIFIED: ICD-10-CM

## 2022-08-22 DIAGNOSIS — F32.9 MAJOR DEPRESSIVE DISORDER, SINGLE EPISODE, UNSPECIFIED: ICD-10-CM

## 2022-08-22 DIAGNOSIS — Z20.822 CONTACT WITH AND (SUSPECTED) EXPOSURE TO COVID-19: ICD-10-CM

## 2022-08-22 DIAGNOSIS — Z98.890 OTHER SPECIFIED POSTPROCEDURAL STATES: Chronic | ICD-10-CM

## 2022-08-22 DIAGNOSIS — R53.83 OTHER FATIGUE: ICD-10-CM

## 2022-08-22 DIAGNOSIS — Z91.018 ALLERGY TO OTHER FOODS: ICD-10-CM

## 2022-08-22 DIAGNOSIS — Z96.643 PRESENCE OF ARTIFICIAL HIP JOINT, BILATERAL: ICD-10-CM

## 2022-08-22 DIAGNOSIS — Z79.84 LONG TERM (CURRENT) USE OF ORAL HYPOGLYCEMIC DRUGS: ICD-10-CM

## 2022-08-22 DIAGNOSIS — E03.9 HYPOTHYROIDISM, UNSPECIFIED: ICD-10-CM

## 2022-08-22 DIAGNOSIS — Z96.651 PRESENCE OF RIGHT ARTIFICIAL KNEE JOINT: ICD-10-CM

## 2022-08-22 DIAGNOSIS — F42.4 EXCORIATION (SKIN-PICKING) DISORDER: ICD-10-CM

## 2022-08-22 DIAGNOSIS — D50.9 IRON DEFICIENCY ANEMIA, UNSPECIFIED: ICD-10-CM

## 2022-08-22 DIAGNOSIS — Z86.718 PERSONAL HISTORY OF OTHER VENOUS THROMBOSIS AND EMBOLISM: ICD-10-CM

## 2022-08-22 DIAGNOSIS — I10 ESSENTIAL (PRIMARY) HYPERTENSION: ICD-10-CM

## 2022-08-22 DIAGNOSIS — L03.116 CELLULITIS OF LEFT LOWER LIMB: ICD-10-CM

## 2022-08-22 DIAGNOSIS — R73.03 PREDIABETES: ICD-10-CM

## 2022-08-22 DIAGNOSIS — I35.0 NONRHEUMATIC AORTIC (VALVE) STENOSIS: ICD-10-CM

## 2022-08-22 DIAGNOSIS — I48.91 UNSPECIFIED ATRIAL FIBRILLATION: ICD-10-CM

## 2022-08-22 LAB
ALBUMIN SERPL ELPH-MCNC: 3.6 G/DL — SIGNIFICANT CHANGE UP (ref 3.3–5)
ALP SERPL-CCNC: 100 U/L — SIGNIFICANT CHANGE UP (ref 40–120)
ALT FLD-CCNC: 10 U/L — SIGNIFICANT CHANGE UP (ref 10–45)
ANION GAP SERPL CALC-SCNC: 9 MMOL/L — SIGNIFICANT CHANGE UP (ref 5–17)
APTT BLD: 34.8 SEC — SIGNIFICANT CHANGE UP (ref 27.5–35.5)
AST SERPL-CCNC: 19 U/L — SIGNIFICANT CHANGE UP (ref 10–40)
BASOPHILS # BLD AUTO: 0.03 K/UL — SIGNIFICANT CHANGE UP (ref 0–0.2)
BASOPHILS NFR BLD AUTO: 0.5 % — SIGNIFICANT CHANGE UP (ref 0–2)
BILIRUB SERPL-MCNC: 0.2 MG/DL — SIGNIFICANT CHANGE UP (ref 0.2–1.2)
BLD GP AB SCN SERPL QL: POSITIVE — SIGNIFICANT CHANGE UP
BLD GP AB SCN SERPL QL: POSITIVE — SIGNIFICANT CHANGE UP
BUN SERPL-MCNC: 10 MG/DL — SIGNIFICANT CHANGE UP (ref 7–23)
CALCIUM SERPL-MCNC: 8.6 MG/DL — SIGNIFICANT CHANGE UP (ref 8.4–10.5)
CHLORIDE SERPL-SCNC: 107 MMOL/L — SIGNIFICANT CHANGE UP (ref 96–108)
CO2 SERPL-SCNC: 24 MMOL/L — SIGNIFICANT CHANGE UP (ref 22–31)
CREAT SERPL-MCNC: 0.71 MG/DL — SIGNIFICANT CHANGE UP (ref 0.5–1.3)
EGFR: 91 ML/MIN/1.73M2 — SIGNIFICANT CHANGE UP
EOSINOPHIL # BLD AUTO: 0.2 K/UL — SIGNIFICANT CHANGE UP (ref 0–0.5)
EOSINOPHIL NFR BLD AUTO: 3 % — SIGNIFICANT CHANGE UP (ref 0–6)
GLUCOSE SERPL-MCNC: 117 MG/DL — HIGH (ref 70–99)
HCT VFR BLD CALC: 22.3 % — LOW (ref 34.5–45)
HGB BLD-MCNC: 6 G/DL — CRITICAL LOW (ref 11.5–15.5)
IMM GRANULOCYTES NFR BLD AUTO: 0.3 % — SIGNIFICANT CHANGE UP (ref 0–1.5)
INR BLD: 1.35 — HIGH (ref 0.88–1.16)
LYMPHOCYTES # BLD AUTO: 1.27 K/UL — SIGNIFICANT CHANGE UP (ref 1–3.3)
LYMPHOCYTES # BLD AUTO: 19.3 % — SIGNIFICANT CHANGE UP (ref 13–44)
MCHC RBC-ENTMCNC: 21.8 PG — LOW (ref 27–34)
MCHC RBC-ENTMCNC: 26.9 GM/DL — LOW (ref 32–36)
MCV RBC AUTO: 81.1 FL — SIGNIFICANT CHANGE UP (ref 80–100)
MONOCYTES # BLD AUTO: 0.86 K/UL — SIGNIFICANT CHANGE UP (ref 0–0.9)
MONOCYTES NFR BLD AUTO: 13.1 % — SIGNIFICANT CHANGE UP (ref 2–14)
NEUTROPHILS # BLD AUTO: 4.19 K/UL — SIGNIFICANT CHANGE UP (ref 1.8–7.4)
NEUTROPHILS NFR BLD AUTO: 63.8 % — SIGNIFICANT CHANGE UP (ref 43–77)
NRBC # BLD: 0 /100 WBCS — SIGNIFICANT CHANGE UP (ref 0–0)
PLATELET # BLD AUTO: 560 K/UL — HIGH (ref 150–400)
POTASSIUM SERPL-MCNC: 4 MMOL/L — SIGNIFICANT CHANGE UP (ref 3.5–5.3)
POTASSIUM SERPL-SCNC: 4 MMOL/L — SIGNIFICANT CHANGE UP (ref 3.5–5.3)
PROT SERPL-MCNC: 6.5 G/DL — SIGNIFICANT CHANGE UP (ref 6–8.3)
PROTHROM AB SERPL-ACNC: 16.1 SEC — HIGH (ref 10.5–13.4)
RBC # BLD: 2.75 M/UL — LOW (ref 3.8–5.2)
RBC # FLD: 18.5 % — HIGH (ref 10.3–14.5)
RH IG SCN BLD-IMP: POSITIVE — SIGNIFICANT CHANGE UP
RH IG SCN BLD-IMP: POSITIVE — SIGNIFICANT CHANGE UP
SARS-COV-2 RNA SPEC QL NAA+PROBE: NEGATIVE — SIGNIFICANT CHANGE UP
SODIUM SERPL-SCNC: 140 MMOL/L — SIGNIFICANT CHANGE UP (ref 135–145)
WBC # BLD: 6.57 K/UL — SIGNIFICANT CHANGE UP (ref 3.8–10.5)
WBC # FLD AUTO: 6.57 K/UL — SIGNIFICANT CHANGE UP (ref 3.8–10.5)

## 2022-08-22 PROCEDURE — 99285 EMERGENCY DEPT VISIT HI MDM: CPT | Mod: 25

## 2022-08-22 PROCEDURE — 86860 RBC ANTIBODY ELUTION: CPT

## 2022-08-22 PROCEDURE — 36415 COLL VENOUS BLD VENIPUNCTURE: CPT

## 2022-08-22 PROCEDURE — 85610 PROTHROMBIN TIME: CPT

## 2022-08-22 PROCEDURE — 86901 BLOOD TYPING SEROLOGIC RH(D): CPT

## 2022-08-22 PROCEDURE — 86870 RBC ANTIBODY IDENTIFICATION: CPT

## 2022-08-22 PROCEDURE — 87635 SARS-COV-2 COVID-19 AMP PRB: CPT

## 2022-08-22 PROCEDURE — 86880 COOMBS TEST DIRECT: CPT

## 2022-08-22 PROCEDURE — 86850 RBC ANTIBODY SCREEN: CPT

## 2022-08-22 PROCEDURE — 86900 BLOOD TYPING SEROLOGIC ABO: CPT

## 2022-08-22 PROCEDURE — 99285 EMERGENCY DEPT VISIT HI MDM: CPT

## 2022-08-22 PROCEDURE — 80053 COMPREHEN METABOLIC PANEL: CPT

## 2022-08-22 PROCEDURE — 85730 THROMBOPLASTIN TIME PARTIAL: CPT

## 2022-08-22 PROCEDURE — 85025 COMPLETE CBC W/AUTO DIFF WBC: CPT

## 2022-08-22 PROCEDURE — 86077 PHYS BLOOD BANK SERV XMATCH: CPT

## 2022-08-22 RX ORDER — LANOLIN ALCOHOL/MO/W.PET/CERES
3 CREAM (GRAM) TOPICAL AT BEDTIME
Refills: 0 | Status: DISCONTINUED | OUTPATIENT
Start: 2022-08-22 | End: 2022-08-22

## 2022-08-22 RX ORDER — ACETAMINOPHEN 500 MG
650 TABLET ORAL EVERY 6 HOURS
Refills: 0 | Status: DISCONTINUED | OUTPATIENT
Start: 2022-08-22 | End: 2022-08-22

## 2022-08-22 RX ORDER — TOPIRAMATE 25 MG
150 TABLET ORAL ONCE
Refills: 0 | Status: DISCONTINUED | OUTPATIENT
Start: 2022-08-22 | End: 2022-08-22

## 2022-08-22 RX ORDER — LANOLIN ALCOHOL/MO/W.PET/CERES
5 CREAM (GRAM) TOPICAL ONCE
Refills: 0 | Status: DISCONTINUED | OUTPATIENT
Start: 2022-08-22 | End: 2022-08-22

## 2022-08-22 RX ORDER — ONDANSETRON 8 MG/1
4 TABLET, FILM COATED ORAL ONCE
Refills: 0 | Status: DISCONTINUED | OUTPATIENT
Start: 2022-08-22 | End: 2022-08-22

## 2022-08-22 RX ORDER — QUETIAPINE FUMARATE 200 MG/1
75 TABLET, FILM COATED ORAL ONCE
Refills: 0 | Status: DISCONTINUED | OUTPATIENT
Start: 2022-08-22 | End: 2022-08-22

## 2022-08-22 RX ORDER — METFORMIN HYDROCHLORIDE 850 MG/1
750 TABLET ORAL ONCE
Refills: 0 | Status: DISCONTINUED | OUTPATIENT
Start: 2022-08-22 | End: 2022-08-22

## 2022-08-22 RX ORDER — CLONAZEPAM 1 MG
1 TABLET ORAL ONCE
Refills: 0 | Status: COMPLETED | OUTPATIENT
Start: 2022-08-22 | End: 2022-08-22

## 2022-08-22 NOTE — ED ADULT NURSE REASSESSMENT NOTE - NS ED NURSE REASSESS COMMENT FT1
Received call from blood bank, blood will be ready in~3hrs pending antibodies. Blood bank will call us back when ready.

## 2022-08-22 NOTE — ED ADULT NURSE NOTE - OBJECTIVE STATEMENT
72y female presents to ED for low hemoglobin. Pt seen in [facility] ED last week and told she had hgb of 6.2. Left AMA from [facility], because "I was hungry and they didn't feed me." Pt reports "not feeling well" since then, refused to elaborate. Pt has hx of transfusion, refused to elaborate, states "I'm too hungry to remember." Not agreeable to nursing assessment. Speaking in full and complete sentences. A&Ox4.

## 2022-08-22 NOTE — ED PROVIDER NOTE - NS ED ROS FT
CONSTITUTIONAL: No fever, no chills, + fatigue  EYES: No eye redness, no visual changes  ENT: No ear pain, no sore throat  CARDIOVASCULAR: No chest pain, no palpitations  RESPIRATORY: No cough, no SOB  GI: No abdominal pain, no nausea, no vomiting, no constipation, no diarrhea  GENITOURINARY: No dysuria, no frequency, no hematuria  MUSCULOSKELETAL: No backpain, no joint pain, no myalgias  SKIN: No rash, no peripheral edema  NEURO: No headache, no confusion    ALL OTHER SYSTEMS NEGATIVE.

## 2022-08-22 NOTE — ED PROVIDER NOTE - PHYSICAL EXAMINATION
CONSTITUTIONAL: Non-toxic; in no apparent distress  HEAD: Normocephalic; atraumatic  EYES: PERRL; EOM intact   ENMT: External appears normal  NECK: Supple; non-tender  CARD: Normal S1, S2; no murmurs, rubs, or gallops  RESP: Normal chest excursion with respiration; breath sounds clear and equal bilaterally  ABD: Soft, non-distended; non-tender  EXT: Normal ROM in all four extremities; non-tender to palpation  SKIN: Warm, dry, no rash, + pale skin.  NEURO:  No focal neurological deficiencies. CONSTITUTIONAL: Non-toxic; in no apparent distress  HEAD: Normocephalic; atraumatic  EYES: PERRL; EOM intact   ENMT: External appears normal  NECK: Supple; non-tender  CARD: Normal S1, S2; no murmurs, rubs, or gallops  RESP: Normal chest excursion with respiration; breath sounds clear and equal bilaterally  ABD: Soft, non-distended; non-tender  EXT: Normal ROM in all four extremities; non-tender to palpation, + LLE erythema and oozing pt notes is unchanged recently  SKIN: Warm, dry, no rash, + pale skin.  NEURO:  No focal neurological deficiencies.

## 2022-08-22 NOTE — ED ADULT NURSE REASSESSMENT NOTE - NS ED NURSE REASSESS COMMENT FT1
ANM note: pt eloped from ED with IV. writer called patient [phone] and left a voicemail X 2 to call back. JAYLAN Peres notified.

## 2022-08-22 NOTE — ED ADULT NURSE REASSESSMENT NOTE - NS ED NURSE REASSESS COMMENT FT1
RN looking for patient for med administration for second time. Pt not found in designated area of ED. TANESHA Huerta  and MD Hutson notified.

## 2022-08-22 NOTE — ED ADULT TRIAGE NOTE - OTHER COMPLAINTS
patient presents with low hgb (most recent 6.2)-- patient denies blood thinner use-- reports unknown cause of hgb-- denies hematuria/dark stool

## 2022-08-22 NOTE — ED PROVIDER NOTE - CLINICAL SUMMARY MEDICAL DECISION MAKING FREE TEXT BOX
Known Iron Deficiency and Anemia worked up outpatient by hematologist presented with Anemia on outpatient labs. Confirmed to be anemic in ED with HGB of 6.0. Will transfuse two units PRBC. Pt states she would like to be DC after. No evidence of bleeding per exam, will likely DC if pts' ED course is unremarkable. Known Iron Deficiency and Anemia worked up outpatient by hematologist presented with Anemia on outpatient labs. Confirmed to be anemic in ED with HGB of 6.0. Will transfuse two units PRBC. Pt states she would like to be DC after. No evidence of bleeding per exam, will likely DC if pts' ED course is unremarkable.  LLE cellulitis - pt notes is chronic and unchanged recently, will likely give Abx course if dc'd otherwise recommend abx via IV inpatient. No s/s of systemic infection.

## 2022-08-22 NOTE — ED PROVIDER NOTE - PROGRESS NOTE DETAILS
Dr. Combs ([facility]) called re: blood bank tests - Pt noted to have positive direct chaparro, warm agglutinin. No PRBCs available in blood bank that match pts blood type and antibodies. Hematologist recommends admission for heme w/u and transfusion. Dalia paged. Pt remains HDS, c/o anxiety. Home meds ordered for tonight.

## 2022-08-22 NOTE — ED PROVIDER NOTE - IV ALTEPLASE EXCL REL HIDDEN
3rd attempt to reach patient.  Left voice mail on 1st attempt to call and schedule TCM   
Unable to reach patient.  Left voice mail with phone number to call and schedule post hospital follow up visit.  Will attempt to reach patient again tomorrow if not already schedule through portal or call center.    
show

## 2022-08-22 NOTE — CHART NOTE - NSCHARTNOTEFT_GEN_A_CORE
Went down to the ED informed by nurse that patient eloped around 7:30pm.  Nurse tried to call patient multiple times but was not able to reach her.

## 2022-08-22 NOTE — ED PROVIDER NOTE - OBJECTIVE STATEMENT
71 y/o Female with a PMHx of Iron Deficiency, Anemia, Bipolar Disorder, HTN, pre-DM, Hypothyroidism, A-fib (not on Eliquis due to Anemia), and Chronic cellulitis presents to the ED c/o fatigue and low HGB on outpatient labs (6.2 most recent), required multiple transfusions in past for anemia and follows up with hematologist. Pt is x1 week s/p Endoscopy and Colonoscopy, found no bleeding and has a plan for marrow biopsy with hematologist. She reports no dark bowel movement or emesis and no abdominal pain. x1 week fatigue, no syncope and no other symptoms. 73 y/o Female with a PMHx of Iron Deficiency Anemia, Bipolar Disorder, HTN, pre-DM, Hypothyroidism, A-fib (not on Eliquis due to Anemia), and LLE Chronic cellulitis presents to the ED c/o fatigue and low HGB on outpatient labs (6.2 most recent), required multiple transfusions in past for anemia and follows up with hematologist. Pt is x1 week s/p Endoscopy and Colonoscopy, found no bleeding and has a plan for marrow biopsy with hematologist. She reports no dark bowel movement or emesis and no abdominal pain. x1 week fatigue, no syncope and no other symptoms.

## 2022-09-11 ENCOUNTER — EMERGENCY (EMERGENCY)
Facility: HOSPITAL | Age: 72
LOS: 1 days | Discharge: HOME | End: 2022-09-11
Attending: EMERGENCY MEDICINE | Admitting: EMERGENCY MEDICINE
Payer: MEDICARE

## 2022-09-11 VITALS
TEMPERATURE: 98 F | DIASTOLIC BLOOD PRESSURE: 82 MMHG | SYSTOLIC BLOOD PRESSURE: 131 MMHG | OXYGEN SATURATION: 97 % | HEART RATE: 84 BPM | RESPIRATION RATE: 17 BRPM

## 2022-09-11 VITALS
TEMPERATURE: 99 F | HEIGHT: 63 IN | DIASTOLIC BLOOD PRESSURE: 76 MMHG | WEIGHT: 169.98 LBS | RESPIRATION RATE: 18 BRPM | SYSTOLIC BLOOD PRESSURE: 138 MMHG | HEART RATE: 94 BPM | OXYGEN SATURATION: 98 %

## 2022-09-11 DIAGNOSIS — Z98.890 OTHER SPECIFIED POSTPROCEDURAL STATES: Chronic | ICD-10-CM

## 2022-09-11 DIAGNOSIS — Z96.651 PRESENCE OF RIGHT ARTIFICIAL KNEE JOINT: Chronic | ICD-10-CM

## 2022-09-11 LAB
ALBUMIN SERPL ELPH-MCNC: 4.1 G/DL — SIGNIFICANT CHANGE UP (ref 3.3–5)
ALP SERPL-CCNC: 114 U/L — SIGNIFICANT CHANGE UP (ref 40–120)
ALT FLD-CCNC: 17 U/L — SIGNIFICANT CHANGE UP (ref 10–45)
ANION GAP SERPL CALC-SCNC: 13 MMOL/L — SIGNIFICANT CHANGE UP (ref 5–17)
ANISOCYTOSIS BLD QL: SLIGHT — SIGNIFICANT CHANGE UP
APTT BLD: 40.5 SEC — HIGH (ref 27.5–35.5)
AST SERPL-CCNC: 18 U/L — SIGNIFICANT CHANGE UP (ref 10–40)
BASOPHILS # BLD AUTO: 0 K/UL — SIGNIFICANT CHANGE UP (ref 0–0.2)
BASOPHILS NFR BLD AUTO: 0 % — SIGNIFICANT CHANGE UP (ref 0–2)
BILIRUB SERPL-MCNC: 0.2 MG/DL — SIGNIFICANT CHANGE UP (ref 0.2–1.2)
BLD GP AB SCN SERPL QL: POSITIVE — SIGNIFICANT CHANGE UP
BUN SERPL-MCNC: 12 MG/DL — SIGNIFICANT CHANGE UP (ref 7–23)
BURR CELLS BLD QL SMEAR: PRESENT — SIGNIFICANT CHANGE UP
CALCIUM SERPL-MCNC: 9.2 MG/DL — SIGNIFICANT CHANGE UP (ref 8.4–10.5)
CHLORIDE SERPL-SCNC: 110 MMOL/L — HIGH (ref 96–108)
CO2 SERPL-SCNC: 20 MMOL/L — LOW (ref 22–31)
CREAT SERPL-MCNC: 0.63 MG/DL — SIGNIFICANT CHANGE UP (ref 0.5–1.3)
DACRYOCYTES BLD QL SMEAR: SLIGHT — SIGNIFICANT CHANGE UP
EGFR: 95 ML/MIN/1.73M2 — SIGNIFICANT CHANGE UP
EOSINOPHIL # BLD AUTO: 0.5 K/UL — SIGNIFICANT CHANGE UP (ref 0–0.5)
EOSINOPHIL NFR BLD AUTO: 6.1 % — HIGH (ref 0–6)
GLUCOSE SERPL-MCNC: 106 MG/DL — HIGH (ref 70–99)
HCT VFR BLD CALC: 29.5 % — LOW (ref 34.5–45)
HGB BLD-MCNC: 8.2 G/DL — LOW (ref 11.5–15.5)
HYPOCHROMIA BLD QL: SLIGHT — SIGNIFICANT CHANGE UP
INR BLD: 1.26 — HIGH (ref 0.88–1.16)
LYMPHOCYTES # BLD AUTO: 1.94 K/UL — SIGNIFICANT CHANGE UP (ref 1–3.3)
LYMPHOCYTES # BLD AUTO: 23.5 % — SIGNIFICANT CHANGE UP (ref 13–44)
MANUAL SMEAR VERIFICATION: SIGNIFICANT CHANGE UP
MCHC RBC-ENTMCNC: 22.5 PG — LOW (ref 27–34)
MCHC RBC-ENTMCNC: 27.8 GM/DL — LOW (ref 32–36)
MCV RBC AUTO: 80.8 FL — SIGNIFICANT CHANGE UP (ref 80–100)
MICROCYTES BLD QL: SLIGHT — SIGNIFICANT CHANGE UP
MONOCYTES # BLD AUTO: 0.93 K/UL — HIGH (ref 0–0.9)
MONOCYTES NFR BLD AUTO: 11.3 % — SIGNIFICANT CHANGE UP (ref 2–14)
NEUTROPHILS # BLD AUTO: 4.89 K/UL — SIGNIFICANT CHANGE UP (ref 1.8–7.4)
NEUTROPHILS NFR BLD AUTO: 59.1 % — SIGNIFICANT CHANGE UP (ref 43–77)
OVALOCYTES BLD QL SMEAR: SLIGHT — SIGNIFICANT CHANGE UP
PLAT MORPH BLD: NORMAL — SIGNIFICANT CHANGE UP
PLATELET # BLD AUTO: 682 K/UL — HIGH (ref 150–400)
POIKILOCYTOSIS BLD QL AUTO: SIGNIFICANT CHANGE UP
POLYCHROMASIA BLD QL SMEAR: SLIGHT — SIGNIFICANT CHANGE UP
POTASSIUM SERPL-MCNC: 4.5 MMOL/L — SIGNIFICANT CHANGE UP (ref 3.5–5.3)
POTASSIUM SERPL-SCNC: 4.5 MMOL/L — SIGNIFICANT CHANGE UP (ref 3.5–5.3)
PROT SERPL-MCNC: 7.3 G/DL — SIGNIFICANT CHANGE UP (ref 6–8.3)
PROTHROM AB SERPL-ACNC: 15 SEC — HIGH (ref 10.5–13.4)
RBC # BLD: 3.65 M/UL — LOW (ref 3.8–5.2)
RBC # FLD: 18.5 % — HIGH (ref 10.3–14.5)
RBC BLD AUTO: ABNORMAL
RH IG SCN BLD-IMP: POSITIVE — SIGNIFICANT CHANGE UP
SARS-COV-2 RNA SPEC QL NAA+PROBE: NEGATIVE — SIGNIFICANT CHANGE UP
SODIUM SERPL-SCNC: 143 MMOL/L — SIGNIFICANT CHANGE UP (ref 135–145)
WBC # BLD: 8.27 K/UL — SIGNIFICANT CHANGE UP (ref 3.8–10.5)
WBC # FLD AUTO: 8.27 K/UL — SIGNIFICANT CHANGE UP (ref 3.8–10.5)

## 2022-09-11 PROCEDURE — 87635 SARS-COV-2 COVID-19 AMP PRB: CPT

## 2022-09-11 PROCEDURE — 36415 COLL VENOUS BLD VENIPUNCTURE: CPT

## 2022-09-11 PROCEDURE — 93010 ELECTROCARDIOGRAM REPORT: CPT

## 2022-09-11 PROCEDURE — 86077 PHYS BLOOD BANK SERV XMATCH: CPT

## 2022-09-11 PROCEDURE — 73030 X-RAY EXAM OF SHOULDER: CPT | Mod: 26,RT

## 2022-09-11 PROCEDURE — 73502 X-RAY EXAM HIP UNI 2-3 VIEWS: CPT | Mod: 26

## 2022-09-11 PROCEDURE — 85730 THROMBOPLASTIN TIME PARTIAL: CPT

## 2022-09-11 PROCEDURE — 86870 RBC ANTIBODY IDENTIFICATION: CPT

## 2022-09-11 PROCEDURE — 73030 X-RAY EXAM OF SHOULDER: CPT

## 2022-09-11 PROCEDURE — 80053 COMPREHEN METABOLIC PANEL: CPT

## 2022-09-11 PROCEDURE — 85610 PROTHROMBIN TIME: CPT

## 2022-09-11 PROCEDURE — 99284 EMERGENCY DEPT VISIT MOD MDM: CPT | Mod: 25

## 2022-09-11 PROCEDURE — 73502 X-RAY EXAM HIP UNI 2-3 VIEWS: CPT

## 2022-09-11 PROCEDURE — 85025 COMPLETE CBC W/AUTO DIFF WBC: CPT

## 2022-09-11 PROCEDURE — 73502 X-RAY EXAM HIP UNI 2-3 VIEWS: CPT | Mod: 26,RT

## 2022-09-11 PROCEDURE — 86900 BLOOD TYPING SEROLOGIC ABO: CPT

## 2022-09-11 PROCEDURE — 86901 BLOOD TYPING SEROLOGIC RH(D): CPT

## 2022-09-11 PROCEDURE — 86880 COOMBS TEST DIRECT: CPT

## 2022-09-11 PROCEDURE — 93005 ELECTROCARDIOGRAM TRACING: CPT

## 2022-09-11 PROCEDURE — 86860 RBC ANTIBODY ELUTION: CPT

## 2022-09-11 PROCEDURE — 86850 RBC ANTIBODY SCREEN: CPT

## 2022-09-11 RX ORDER — CEFUROXIME AXETIL 250 MG
250 TABLET ORAL ONCE
Refills: 0 | Status: COMPLETED | OUTPATIENT
Start: 2022-09-11 | End: 2022-09-11

## 2022-09-11 RX ORDER — CEFUROXIME AXETIL 250 MG
1 TABLET ORAL
Qty: 20 | Refills: 0
Start: 2022-09-11 | End: 2022-09-20

## 2022-09-11 NOTE — ED ADULT NURSE NOTE - NSIMPLEMENTINTERV_GEN_ALL_ED
Implemented All Fall Risk Interventions:  [place] to call system. Call bell, personal items and telephone within reach. Instruct patient to call for assistance. Room bathroom lighting operational. Non-slip footwear when patient is off stretcher. Physically safe environment: no spills, clutter or unnecessary equipment. Stretcher in lowest position, wheels locked, appropriate side rails in place. Provide visual cue, wrist band, yellow gown, etc. Monitor gait and stability. Monitor for mental status changes and reorient to person, place, and time. Review medications for side effects contributing to fall risk. Reinforce activity limits and safety measures with patient and family.

## 2022-09-11 NOTE — ED ADULT TRIAGE NOTE - CHIEF COMPLAINT QUOTE
Pt presents to ED co weakness progressing over the past few days. Hx of anemia requiring blood transfusion in the past. Pt also reporting R hip pain, pt had R hip replacement 7/2021. Ambulatory into ED with walker. Denies lightheadedness, dizziness, hematuria, dark stools.

## 2022-09-11 NOTE — ED PROVIDER NOTE - CLINICAL SUMMARY MEDICAL DECISION MAKING FREE TEXT BOX
chronic skin wounds, check h/h - chronic skin wounds, check h/h - may have early skin infection of chronic skin wounds

## 2022-09-11 NOTE — ED PROVIDER NOTE - CARE PLAN
Detail Level: Zone Detail Level: Detailed Principal Discharge DX:	Anemia of chronic disease  Secondary Diagnosis:	Cellulitis   1

## 2022-09-11 NOTE — ED ADULT NURSE NOTE - OBJECTIVE STATEMENT
72 y.o female c/o generalized weakness, right hip and shoulder pain x days. Pt states she is anemic and had a transfusion a few weeks ago. Pt states she had R hip surgery 07/2021 and has had on and off problems with hip. States she has a bruise where incision is and that she often picks her skin. Pt c/o right shoulder discomfort, noted with 2 scabs in various stages of healing. Pt denies cp, sob, dizziness, n/v. Pt states she has previously been diagnosed with afib after hip surgery, not currently on blood thinners. Pt noted ambulating slowly but steadily with walker. Pt noted with sanguinous drainage and large open wound on left lower extremity spanning entire length from ankle to knee. Pt states she has had wound since August 2021.

## 2022-09-11 NOTE — ED PROVIDER NOTE - NSFOLLOWUPINSTRUCTIONS_ED_ALL_ED_FT
Anemia       Anemia is a condition in which there is not enough red blood cells or hemoglobin in the blood. Hemoglobin is a substance in red blood cells that carries oxygen.    When you do not have enough red blood cells or hemoglobin (are anemic), your body cannot get enough oxygen and your organs may not work properly. As a result, you may feel very tired or have other problems.      What are the causes?    Common causes of anemia include:  •Excessive bleeding. Anemia can be caused by excessive bleeding inside or outside the body, including bleeding from the intestines or from heavy menstrual periods in females.      •Poor nutrition.      •Long-lasting (chronic) kidney, thyroid, and liver disease.      •Bone marrow disorders, spleen problems, and blood disorders.      •Cancer and treatments for cancer.      •HIV (human immunodeficiency virus) and AIDS (acquired immunodeficiency syndrome).      •Infections, medicines, and autoimmune disorders that destroy red blood cells.        What are the signs or symptoms?    Symptoms of this condition include:  •Minor weakness.      •Dizziness.      •Headache, or difficulties concentrating and sleeping.      •Heartbeats that feel irregular or faster than normal (palpitations).      •Shortness of breath, especially with exercise.      •Pale skin, lips, and nails, or cold hands and feet.      •Indigestion and nausea.      Symptoms may occur suddenly or develop slowly. If your anemia is mild, you may not have symptoms.      How is this diagnosed?    This condition is diagnosed based on blood tests, your medical history, and a physical exam. In some cases, a test may be needed in which cells are removed from the soft tissue inside of a bone and looked at under a microscope (bone marrow biopsy). Your health care provider may also check your stool (feces) for blood and may do additional testing to look for the cause of your bleeding.    Other tests may include:  •Imaging tests, such as a CT scan or MRI.      •A procedure to see inside your esophagus and stomach (endoscopy).      •A procedure to see inside your colon and rectum (colonoscopy).        How is this treated?    Treatment for this condition depends on the cause. If you continue to lose a lot of blood, you may need to be treated at a hospital. Treatment may include:  •Taking supplements of iron, vitamin B12, or folic acid.      •Taking a hormone medicine (erythropoietin) that can help to stimulate red blood cell growth.      •Having a blood transfusion. This may be needed if you lose a lot of blood.      •Making changes to your diet.      •Having surgery to remove your spleen.        Follow these instructions at home:    •Take over-the-counter and prescription medicines only as told by your health care provider.      •Take supplements only as told by your health care provider.      •Follow any diet instructions that you were given by your health care provider.      •Keep all follow-up visits as told by your health care provider. This is important.        Contact a health care provider if:    •You develop new bleeding anywhere in the body.        Get help right away if:    •You are very weak.      •You are short of breath.      •You have pain in your abdomen or chest.      •You are dizzy or feel faint.      •You have trouble concentrating.      •You have bloody stools, black stools, or tarry stools.      •You vomit repeatedly or you vomit up blood.      These symptoms may represent a serious problem that is an emergency. Do not wait to see if the symptoms will go away. Get medical help right away. Call your local emergency services (911 in the [place] Do not drive yourself to the hospital.       Summary    •Anemia is a condition in which you do not have enough red blood cells or enough of a substance in your red blood cells that carries oxygen (hemoglobin).      •Symptoms may occur suddenly or develop slowly.      •If your anemia is mild, you may not have symptoms.      •This condition is diagnosed with blood tests, a medical history, and a physical exam. Other tests may be needed.      •Treatment for this condition depends on the cause of the anemia.      This information is not intended to replace advice given to you by your health care provider. Make sure you discuss any questions you have with your health care provider.      Document Revised: 11/24/2020 Document Reviewed: 11/24/2020    Elsevier Patient Education © 2022 [employer] Inc[employer]

## 2022-09-11 NOTE — ED PROVIDER NOTE - PATIENT PORTAL LINK FT
You can access the FollowMyHealth Patient Portal offered by [facility] by registering at the following website: [email or web address]. By joining [facility]’s FollowMyHealth portal, you will also be able to view your health information using other applications (apps) compatible with our system.

## 2022-09-11 NOTE — ED PROVIDER NOTE - MUSCULOSKELETAL, MLM
Spine appears normal, range of motion is not limited, no muscle or joint tenderness full ROM hips BL

## 2022-09-11 NOTE — ED PROVIDER NOTE - DISCHARGE DATE
11:26 AM  I have evaluated the patient as the Provider in Triage. I have reviewed Her vital signs and the triage nurse assessment. I have talked with the patient and any available family and advised that I am the provider in triage and have ordered the appropriate study to initiate their work up based on the clinical presentation during my assessment. I have advised that the patient will be accommodated in the Main ED as soon as possible. I have also requested to contact the triage nurse or myself immediately if the patient experiences any changes in their condition during this brief waiting period. Patient complains of dizziness that started a month ago. This is her fourth emergency room visit for same. On the first visit she had a CT of her head that was negative. Put on meclizine by [facility] emergency room, no improvement of symptoms. Returns today with worsening dizziness. No chest pain, shortness of breath.   Alina Crump, REBEKA 11-Sep-2022

## 2022-09-11 NOTE — ED PROVIDER NOTE - SKIN, MLM
chronic L leg wound- large open superficial wound w surrounding gran tissue no induration/pus/mal odor- R hip wound- thickened skin w gran tissue no warmth/ind/fluctuance

## 2022-09-11 NOTE — ED PROVIDER NOTE - OBJECTIVE STATEMENT
73 y/o Female with a PMHx of Iron Deficiency Anemia, Bipolar Disorder, HTN, pre-DM, Hypothyroidism, A-fib (not on Eliquis due to Anemia), and LLE Chronic cellulitis presents to the ED c/o fatigue and low HGB on outpatient labs (6.2 most recent), required multiple transfusions in past for anemia and follows up with hematologist. Pt is x1 week s/p Endoscopy and Colonoscopy, found no bleeding and has a plan for marrow biopsy with hematologist. She reports no dark b 73 y/o Female with a PMHx of Iron Deficiency Anemia, Bipolar Disorder, HTN, pre-DM, Hypothyroidism, A-fib (not on Eliquis due to Anemia), and LLE Chronic cellulitis presents to the ED c/o fatigue and low HGB on outpatient labs (6.2 most recent), required multiple transfusions in past for anemia and follows up with hematologist. Pt is x1 week s/p Endoscopy and Colonoscopy, found no bleeding and has a plan for marrow biopsy with hematologist  w yvon complaints- gen fatigue weakness- worried her hgb is low- also co occ int R shoulder pain and occ R hip itch to R hip repair scab- hip replaced 1+ yr ago- scratches the wound and another wound on her leg- leg wound appears unchanged to her  no f/c no pain to wound  no sig exac/allev factors  px ambulates with walker

## 2022-09-13 DIAGNOSIS — R73.03 PREDIABETES: ICD-10-CM

## 2022-09-13 DIAGNOSIS — E03.9 HYPOTHYROIDISM, UNSPECIFIED: ICD-10-CM

## 2022-09-13 DIAGNOSIS — I10 ESSENTIAL (PRIMARY) HYPERTENSION: ICD-10-CM

## 2022-09-13 DIAGNOSIS — L03.116 CELLULITIS OF LEFT LOWER LIMB: ICD-10-CM

## 2022-09-13 DIAGNOSIS — Z91.018 ALLERGY TO OTHER FOODS: ICD-10-CM

## 2022-09-13 DIAGNOSIS — F31.9 BIPOLAR DISORDER, UNSPECIFIED: ICD-10-CM

## 2022-09-13 DIAGNOSIS — I48.91 UNSPECIFIED ATRIAL FIBRILLATION: ICD-10-CM

## 2022-09-13 DIAGNOSIS — M25.551 PAIN IN RIGHT HIP: ICD-10-CM

## 2022-09-13 DIAGNOSIS — M25.511 PAIN IN RIGHT SHOULDER: ICD-10-CM

## 2022-09-13 DIAGNOSIS — D63.8 ANEMIA IN OTHER CHRONIC DISEASES CLASSIFIED ELSEWHERE: ICD-10-CM

## 2022-09-13 DIAGNOSIS — Z79.84 LONG TERM (CURRENT) USE OF ORAL HYPOGLYCEMIC DRUGS: ICD-10-CM

## 2022-09-13 DIAGNOSIS — R53.83 OTHER FATIGUE: ICD-10-CM

## 2022-09-13 DIAGNOSIS — Z20.822 CONTACT WITH AND (SUSPECTED) EXPOSURE TO COVID-19: ICD-10-CM

## 2022-09-22 ENCOUNTER — INPATIENT (INPATIENT)
Facility: HOSPITAL | Age: 72
LOS: 0 days | Discharge: LEFT BEFORE DISCHARGE | DRG: 812 | End: 2022-09-23
Attending: GENERAL ACUTE CARE HOSPITAL | Admitting: GENERAL ACUTE CARE HOSPITAL
Payer: MEDICARE

## 2022-09-22 VITALS
SYSTOLIC BLOOD PRESSURE: 126 MMHG | WEIGHT: 149.91 LBS | TEMPERATURE: 98 F | HEART RATE: 83 BPM | RESPIRATION RATE: 17 BRPM | OXYGEN SATURATION: 97 % | DIASTOLIC BLOOD PRESSURE: 74 MMHG | HEIGHT: 63 IN

## 2022-09-22 DIAGNOSIS — Z98.890 OTHER SPECIFIED POSTPROCEDURAL STATES: Chronic | ICD-10-CM

## 2022-09-22 DIAGNOSIS — Z29.9 ENCOUNTER FOR PROPHYLACTIC MEASURES, UNSPECIFIED: ICD-10-CM

## 2022-09-22 DIAGNOSIS — F90.9 ATTENTION-DEFICIT HYPERACTIVITY DISORDER, UNSPECIFIED TYPE: ICD-10-CM

## 2022-09-22 DIAGNOSIS — Z96.651 PRESENCE OF RIGHT ARTIFICIAL KNEE JOINT: Chronic | ICD-10-CM

## 2022-09-22 DIAGNOSIS — D64.9 ANEMIA, UNSPECIFIED: ICD-10-CM

## 2022-09-22 DIAGNOSIS — L03.90 CELLULITIS, UNSPECIFIED: ICD-10-CM

## 2022-09-22 DIAGNOSIS — E03.9 HYPOTHYROIDISM, UNSPECIFIED: ICD-10-CM

## 2022-09-22 DIAGNOSIS — F32.9 MAJOR DEPRESSIVE DISORDER, SINGLE EPISODE, UNSPECIFIED: ICD-10-CM

## 2022-09-22 DIAGNOSIS — I35.0 NONRHEUMATIC AORTIC (VALVE) STENOSIS: ICD-10-CM

## 2022-09-22 DIAGNOSIS — R73.03 PREDIABETES: ICD-10-CM

## 2022-09-22 DIAGNOSIS — G47.00 INSOMNIA, UNSPECIFIED: ICD-10-CM

## 2022-09-22 LAB
ALBUMIN SERPL ELPH-MCNC: 4 G/DL — SIGNIFICANT CHANGE UP (ref 3.3–5)
ALP SERPL-CCNC: 89 U/L — SIGNIFICANT CHANGE UP (ref 40–120)
ALT FLD-CCNC: 11 U/L — SIGNIFICANT CHANGE UP (ref 10–45)
ANION GAP SERPL CALC-SCNC: 10 MMOL/L — SIGNIFICANT CHANGE UP (ref 5–17)
APPEARANCE UR: CLEAR — SIGNIFICANT CHANGE UP
APTT BLD: 39 SEC — HIGH (ref 27.5–35.5)
AST SERPL-CCNC: 15 U/L — SIGNIFICANT CHANGE UP (ref 10–40)
BASOPHILS # BLD AUTO: 0.1 K/UL — SIGNIFICANT CHANGE UP (ref 0–0.2)
BASOPHILS NFR BLD AUTO: 1.7 % — SIGNIFICANT CHANGE UP (ref 0–2)
BILIRUB SERPL-MCNC: 0.2 MG/DL — SIGNIFICANT CHANGE UP (ref 0.2–1.2)
BILIRUB UR-MCNC: NEGATIVE — SIGNIFICANT CHANGE UP
BLD GP AB SCN SERPL QL: POSITIVE — SIGNIFICANT CHANGE UP
BUN SERPL-MCNC: 9 MG/DL — SIGNIFICANT CHANGE UP (ref 7–23)
CALCIUM SERPL-MCNC: 8.8 MG/DL — SIGNIFICANT CHANGE UP (ref 8.4–10.5)
CHLORIDE SERPL-SCNC: 107 MMOL/L — SIGNIFICANT CHANGE UP (ref 96–108)
CO2 SERPL-SCNC: 23 MMOL/L — SIGNIFICANT CHANGE UP (ref 22–31)
COLOR SPEC: YELLOW — SIGNIFICANT CHANGE UP
CREAT SERPL-MCNC: 0.61 MG/DL — SIGNIFICANT CHANGE UP (ref 0.5–1.3)
DIFF PNL FLD: NEGATIVE — SIGNIFICANT CHANGE UP
EGFR: 96 ML/MIN/1.73M2 — SIGNIFICANT CHANGE UP
EOSINOPHIL # BLD AUTO: 0 K/UL — SIGNIFICANT CHANGE UP (ref 0–0.5)
EOSINOPHIL NFR BLD AUTO: 0 % — SIGNIFICANT CHANGE UP (ref 0–6)
GLUCOSE BLDC GLUCOMTR-MCNC: 179 MG/DL — HIGH (ref 70–99)
GLUCOSE SERPL-MCNC: 104 MG/DL — HIGH (ref 70–99)
GLUCOSE UR QL: NEGATIVE — SIGNIFICANT CHANGE UP
HCT VFR BLD CALC: 24.2 % — LOW (ref 34.5–45)
HGB BLD-MCNC: 6.7 G/DL — CRITICAL LOW (ref 11.5–15.5)
INR BLD: 1.34 — HIGH (ref 0.88–1.16)
KETONES UR-MCNC: NEGATIVE — SIGNIFICANT CHANGE UP
LEUKOCYTE ESTERASE UR-ACNC: NEGATIVE — SIGNIFICANT CHANGE UP
LYMPHOCYTES # BLD AUTO: 1.4 K/UL — SIGNIFICANT CHANGE UP (ref 1–3.3)
LYMPHOCYTES # BLD AUTO: 23.7 % — SIGNIFICANT CHANGE UP (ref 13–44)
MCHC RBC-ENTMCNC: 23.2 PG — LOW (ref 27–34)
MCHC RBC-ENTMCNC: 27.7 GM/DL — LOW (ref 32–36)
MCV RBC AUTO: 83.7 FL — SIGNIFICANT CHANGE UP (ref 80–100)
MONOCYTES # BLD AUTO: 0.78 K/UL — SIGNIFICANT CHANGE UP (ref 0–0.9)
MONOCYTES NFR BLD AUTO: 13.2 % — SIGNIFICANT CHANGE UP (ref 2–14)
NEUTROPHILS # BLD AUTO: 3.63 K/UL — SIGNIFICANT CHANGE UP (ref 1.8–7.4)
NEUTROPHILS NFR BLD AUTO: 61.4 % — SIGNIFICANT CHANGE UP (ref 43–77)
NITRITE UR-MCNC: NEGATIVE — SIGNIFICANT CHANGE UP
PH UR: 6.5 — SIGNIFICANT CHANGE UP (ref 5–8)
PLATELET # BLD AUTO: 524 K/UL — HIGH (ref 150–400)
POTASSIUM SERPL-MCNC: 3.9 MMOL/L — SIGNIFICANT CHANGE UP (ref 3.5–5.3)
POTASSIUM SERPL-SCNC: 3.9 MMOL/L — SIGNIFICANT CHANGE UP (ref 3.5–5.3)
PROT SERPL-MCNC: 6.8 G/DL — SIGNIFICANT CHANGE UP (ref 6–8.3)
PROT UR-MCNC: NEGATIVE MG/DL — SIGNIFICANT CHANGE UP
PROTHROM AB SERPL-ACNC: 16 SEC — HIGH (ref 10.5–13.4)
RBC # BLD: 2.89 M/UL — LOW (ref 3.8–5.2)
RBC # FLD: 23.5 % — HIGH (ref 10.3–14.5)
RH IG SCN BLD-IMP: POSITIVE — SIGNIFICANT CHANGE UP
SARS-COV-2 RNA SPEC QL NAA+PROBE: NEGATIVE — SIGNIFICANT CHANGE UP
SODIUM SERPL-SCNC: 140 MMOL/L — SIGNIFICANT CHANGE UP (ref 135–145)
SP GR SPEC: 1.02 — SIGNIFICANT CHANGE UP (ref 1–1.03)
UROBILINOGEN FLD QL: 0.2 E.U./DL — SIGNIFICANT CHANGE UP
WBC # BLD: 5.91 K/UL — SIGNIFICANT CHANGE UP (ref 3.8–10.5)
WBC # FLD AUTO: 5.91 K/UL — SIGNIFICANT CHANGE UP (ref 3.8–10.5)

## 2022-09-22 PROCEDURE — 99221 1ST HOSP IP/OBS SF/LOW 40: CPT | Mod: AI

## 2022-09-22 PROCEDURE — 99285 EMERGENCY DEPT VISIT HI MDM: CPT | Mod: FS

## 2022-09-22 RX ORDER — METFORMIN HYDROCHLORIDE 850 MG/1
1 TABLET ORAL
Qty: 0 | Refills: 0 | DISCHARGE

## 2022-09-22 RX ORDER — LIOTHYRONINE SODIUM 25 UG/1
12.5 TABLET ORAL DAILY
Refills: 0 | Status: DISCONTINUED | OUTPATIENT
Start: 2022-09-23 | End: 2022-09-23

## 2022-09-22 RX ORDER — BUPROPION HYDROCHLORIDE 150 MG/1
1 TABLET, EXTENDED RELEASE ORAL
Qty: 0 | Refills: 0 | DISCHARGE

## 2022-09-22 RX ORDER — LANOLIN ALCOHOL/MO/W.PET/CERES
1 CREAM (GRAM) TOPICAL
Qty: 0 | Refills: 0 | DISCHARGE

## 2022-09-22 RX ORDER — BUPROPION HYDROCHLORIDE 150 MG/1
150 TABLET, EXTENDED RELEASE ORAL DAILY
Refills: 0 | Status: DISCONTINUED | OUTPATIENT
Start: 2022-09-23 | End: 2022-09-23

## 2022-09-22 RX ORDER — LITHIUM CARBONATE 300 MG/1
150 TABLET, EXTENDED RELEASE ORAL EVERY 24 HOURS
Refills: 0 | Status: DISCONTINUED | OUTPATIENT
Start: 2022-09-22 | End: 2022-09-23

## 2022-09-22 RX ORDER — CLONAZEPAM 1 MG
3 TABLET ORAL EVERY 24 HOURS
Refills: 0 | Status: DISCONTINUED | OUTPATIENT
Start: 2022-09-22 | End: 2022-09-23

## 2022-09-22 RX ORDER — LANOLIN ALCOHOL/MO/W.PET/CERES
10 CREAM (GRAM) TOPICAL AT BEDTIME
Refills: 0 | Status: DISCONTINUED | OUTPATIENT
Start: 2022-09-22 | End: 2022-09-23

## 2022-09-22 RX ORDER — QUETIAPINE FUMARATE 200 MG/1
75 TABLET, FILM COATED ORAL EVERY 24 HOURS
Refills: 0 | Status: DISCONTINUED | OUTPATIENT
Start: 2022-09-22 | End: 2022-09-23

## 2022-09-22 RX ORDER — TOPIRAMATE 25 MG
200 TABLET ORAL EVERY 24 HOURS
Refills: 0 | Status: DISCONTINUED | OUTPATIENT
Start: 2022-09-22 | End: 2022-09-23

## 2022-09-22 RX ORDER — LEVOTHYROXINE SODIUM 125 MCG
112 TABLET ORAL DAILY
Refills: 0 | Status: DISCONTINUED | OUTPATIENT
Start: 2022-09-23 | End: 2022-09-23

## 2022-09-22 RX ORDER — TOPIRAMATE 25 MG
3 TABLET ORAL
Qty: 0 | Refills: 0 | DISCHARGE

## 2022-09-22 RX ORDER — FOLIC ACID 7.5 MG
1 TABLET ORAL
Qty: 0 | Refills: 0 | DISCHARGE

## 2022-09-22 RX ORDER — VENLAFAXINE HCL 75 MG
300 CAPSULE, EXT RELEASE 24 HR ORAL DAILY
Refills: 0 | Status: DISCONTINUED | OUTPATIENT
Start: 2022-09-23 | End: 2022-09-23

## 2022-09-22 RX ORDER — TRAZODONE HCL 50 MG
3 TABLET ORAL
Qty: 0 | Refills: 0 | DISCHARGE

## 2022-09-22 RX ORDER — INSULIN LISPRO 100/ML
VIAL (ML) SUBCUTANEOUS
Refills: 0 | Status: DISCONTINUED | OUTPATIENT
Start: 2022-09-22 | End: 2022-09-23

## 2022-09-22 RX ORDER — TOPIRAMATE 25 MG
1 TABLET ORAL
Qty: 0 | Refills: 0 | DISCHARGE

## 2022-09-22 RX ADMIN — Medication 3 MILLIGRAM(S): at 18:58

## 2022-09-22 RX ADMIN — Medication 2: at 22:40

## 2022-09-22 RX ADMIN — QUETIAPINE FUMARATE 75 MILLIGRAM(S): 200 TABLET, FILM COATED ORAL at 18:58

## 2022-09-22 RX ADMIN — LITHIUM CARBONATE 150 MILLIGRAM(S): 300 TABLET, EXTENDED RELEASE ORAL at 22:09

## 2022-09-22 RX ADMIN — Medication 200 MILLIGRAM(S): at 22:08

## 2022-09-22 RX ADMIN — Medication 10 MILLIGRAM(S): at 22:08

## 2022-09-22 NOTE — H&P ADULT - PROBLEM SELECTOR PLAN 3
On home metformin ER 750mg BID. Last hgb A1c 4.8% in 3/2022.  - f/u A1c in AM  - mISS while inpatient Follows with her psychiatrist, Dr. Martell. Doses confirmed with her home pharmacy.  - continue home Wellbutrin 150mg daily  - continue home Effexor 300mg daily

## 2022-09-22 NOTE — ED PROVIDER NOTE - OBJECTIVE STATEMENT
71 y/o Female with a PMHx of Iron Deficiency Anemia (transfused in past), Bipolar Disorder, HTN, pre-DM, Hypothyroidism, A-fib (not on Eliquis due to Anemia), and LLE Chronic cellulitis sent from pcp for anemia from blood work 2 days ago. Reports feeling generalized weakness, lightheadedness and fatigue for the past 5 days. Associated with sob. Symptoms typical of when her blood counts are low. Denies fever, chills, cough, cp, black/bloody stools. 71 y/o Female with a PMHx of Iron Deficiency Anemia (transfused in past), Bipolar Disorder, HTN, pre-DM, Hypothyroidism, A-fib (not on Eliquis due to Anemia), and LLE Chronic cellulitis sent from pcp for anemia from blood work 2 days ago. Reports feeling generalized weakness, lightheadedness and fatigue for the past 5 days. Associated with sob. Symptoms typical of when her blood counts are low. Denies fever, chills, cough, cp, black/bloody stools. EGD/colonoscopy a few weeks ago normal. Pt states she has been worked up for this anemia for the past year and no one can figure out what is wrong with her.

## 2022-09-22 NOTE — ED PROVIDER NOTE - ATTENDING APP SHARED VISIT CONTRIBUTION OF CARE
71 y/o F with PMHx anemia, bipolar disorder, HTN, pre-DM, a-fib, hypothyroidism, a-fib, LLE chronic cellulits with anemia found by PMD. pt symptomatic. wit SOB. Will transfuse and admit.

## 2022-09-22 NOTE — ED PROVIDER NOTE - PHYSICAL EXAMINATION
CONSTITUTIONAL: Well-appearing;  in no apparent distress.   HEAD: Normocephalic; atraumatic.   EYES: PERRL; EOM intact; conjunctiva and sclera clear  ENT: normal nose; no rhinorrhea; normal pharynx with no erythema or lesions.   NECK: Supple; non-tender; no LAD  CARDIOVASCULAR: Normal S1, S2; No audible murmurs. Regular rate and rhythm.   RESPIRATORY: Breathing easily; breath sounds clear and equal bilaterally; no wheezes, rhonchi, or rales.  GI: Soft; non-distended; non-tender; no palpable organomegaly.   MSK: FROM at all extremities, normal tone   EXT: No cyanosis or edema; N/V intact  SKIN: Normal for age and race; warm; dry; good turgor; no apparent lesions or rash.   NEURO: A & O x 3; face symmetric; grossly unremarkable.   PSYCHOLOGICAL: The patient’s mood and manner are appropriate. CONSTITUTIONAL: Well-appearing;  in no apparent distress.   HEAD: Normocephalic; atraumatic.   EYES: PERRL; EOM intact; conjunctiva and sclera clear  ENT: normal nose; no rhinorrhea; normal pharynx with no erythema or lesions.   NECK: Supple; non-tender; no LAD  CARDIOVASCULAR: Normal S1, S2; +systolic murmur. Regular rate and rhythm.   RESPIRATORY: Breathing easily; breath sounds clear and equal bilaterally; no wheezes, rhonchi, or rales.  GI: Soft; non-distended; non-tender; no palpable organomegaly.   MSK: FROM at all extremities, normal tone   EXT: No cyanosis or edema; N/V intact  SKIN: Normal for age and race; warm; dry; good turgor; no apparent lesions or rash.   NEURO: A & O x 3; face symmetric; grossly unremarkable.   PSYCHOLOGICAL: The patient’s mood and manner are appropriate.

## 2022-09-22 NOTE — ED ADULT NURSE NOTE - OBJECTIVE STATEMENT
.  72years female alert mental state (AOX3) received by walker.  -Allergy: kiwi  -complain of abnormal lab.  Hx of anemia. pt started SOB, lightheadedness, weakness, fatigue ofr a couple days. pt visited her PCP, Dr. Arnold and checked blood workup 2 days ago. Her PCP sent her to the ED due to low Hg.   -denied chest pain, n/v/d, abdomen pain, bloody stool, fever, chills, myalgia.  Pt is in the bed comfortably at this time. Will continue to monitor and document any changes.

## 2022-09-22 NOTE — H&P ADULT - NSHPPHYSICALEXAM_GEN_ALL_CORE
General: in no acute distress, speaking in full sentences on room air  Eyes: EOMI intact bilaterally. Anicteric sclerae, moist conjunctivae  HENT: Moist mucous membranes. Poor dentition, pale oral mucosa.  Neck: Trachea midline, supple  Lungs: CTA B/L. No wheezes, rales, or rhonchi  Cardiovascular: RRR. No rubs or gallops. +midsystolic ejection murmur at RUSB  Abdomen: Soft, non-tender non-distended; No rebound or guarding. +BS  Extremities: WWP, No clubbing, cyanosis or edema  Neurological: Alert and oriented x3  Skin: Warm and dry. +marked left lower leg erythema with serous weeping/crusting wrapped in kerlex General: in no acute distress, speaking in full sentences on room air  Eyes: EOMI intact bilaterally. Anicteric sclerae, moist conjunctivae  HENT: Moist mucous membranes. Poor dentition, pale oral mucosa.  Neck: Trachea midline, supple  Lungs: CTA B/L. No wheezes, rales, or rhonchi  Cardiovascular: RRR. No rubs or gallops. +midsystolic ejection murmur at RUSB  Abdomen: Soft, non-tender non-distended; No rebound or guarding. +BS. HALLE negative.  Extremities: WWP, No clubbing, cyanosis or edema  Neurological: Alert and oriented x3  Skin: Warm and dry. +marked left lower leg erythema with serous weeping/crusting wrapped in kerlex

## 2022-09-22 NOTE — H&P ADULT - PROBLEM SELECTOR PLAN 1
Hb 6.7 mg/dL (baseline 8.2). MCV ***. RDW ***. Likely 2/2 ***. No signs of active bleeding)  - transfuse 1u pRBC  - check post-transfusion CBC, trend in AM  - F/u Fe studies  - maintain active type & screen  - Transfuse if Hgb <7 Hb 6.7 mg/dL (baseline 8.2). MCV 83.7. RDW 23.5. Unclear etiology. No signs of active bleeding. HALLE negative.  Reportedly has had EGD/colonoscopy done within last month, unyielding. Follows with hematologist, Dr. Rand. Has reportedly had thorough anemia workup done in past, excluding BMBx.  - transfuse 1u pRBC  - check post-transfusion CBC, trend in AM  - F/u Fe studies  - maintain active type & screen  - Transfuse if Hgb <7  - consider GI consult if HD unstable  - can notify Dr. Rand in AM Hb 6.7 mg/dL (baseline 8.2). MCV 83.7. RDW 23.5. Unclear etiology. No signs of active bleeding. HALLE negative.  Reportedly has had EGD/colonoscopy done within last month, unyielding. Follows with hematologist, Dr. Rand. Has reportedly had thorough anemia workup done in past, excluding BMBx.  - transfuse 1u pRBC  - check post-transfusion CBC  - trend hgb in AM  - F/u Fe studies  - maintain active type & screen  - Transfuse if Hgb <7  - consider GI consult if HD unstable  - can notify Dr. Rand in AM

## 2022-09-22 NOTE — ED PROVIDER NOTE - CLINICAL SUMMARY MEDICAL DECISION MAKING FREE TEXT BOX
71 y/o Female with a PMHx of Iron Deficiency Anemia (transfused in past), Bipolar Disorder, HTN, pre-DM, Hypothyroidism, A-fib (not on Eliquis due to Anemia), and LLE Chronic cellulitis sent from pcp for anemia from blood work 2 days ago. Reports feeling generalized weakness, lightheadedness and fatigue for the past 5 days. Associated with sob. Symptoms typical of when her blood counts are low. Denies fever, chills, cough, cp, black/bloody stools. VSS. PE unremarkable. hgb 6.7, will transfuse.

## 2022-09-22 NOTE — H&P ADULT - PROBLEM SELECTOR PLAN 4
- continue home synthroid 112  - continue home Cytomel 12.5mg - continue home synthroid 112mcg daily  - continue home Cytomel 12.5mg Takes home Klonopin, Seroquel, Topamax, lithium, melatonin. Doses confirmed with her home pharmacy.  - continue home Klonopin 3mg qHS  - continue home Seroquel 75mg qHS  - continue home topiramate 200mg qHS  - continue home lithium 150mg qHS

## 2022-09-22 NOTE — H&P ADULT - NSHPLABSRESULTS_GEN_ALL_CORE
LABS:                         6.7    5.91  >-----< 524           ( 22 @ 12:29 )             24.2       140  |  107  |   9  ----------------------< 104    (22 @ 12:29)     3.9  |  23  |  0.61    Anion Gap: 10    Ca   8.8   (22 @ 12:29)  Mg   x    (22 @ 12:29)  Phos x    (22:29)       TP 8.8     |  AST x    -------------------------     Alb x     |  ALT x               (22 @ :29)  -------------------------     T-bili x  |  AlkPh 89    D-bili x     COAGULATION STUDIES:     aPTT  39.0 sec    (22 @ 12:29)     PT     16.0 sec    (22 @ 12:29)     INR    1.34          (22 12:29)       Urinalysis Basic - ( 22 Sep 2022 15:18 )    Color: Yellow / Appearance: Clear / S.025 / pH: x  Gluc: x / Ketone: NEGATIVE  / Bili: Negative / Urobili: 0.2 E.U./dL   Blood: x / Protein: NEGATIVE mg/dL / Nitrite: NEGATIVE   Leuk Esterase: NEGATIVE / RBC: x / WBC x   Sq Epi: x / Non Sq Epi: x / Bacteria: x      I/O SUMMARY

## 2022-09-22 NOTE — H&P ADULT - PROBLEM SELECTOR PLAN 5
- continue home Concerta 54mg daily On home metformin ER 750mg BID. Last hgb A1c 4.8% in 3/2022.  - f/u A1c in AM  - mISS while inpatient

## 2022-09-22 NOTE — H&P ADULT - PROBLEM SELECTOR PLAN 2
F: Tolerating PO, no IVF  E: Replete K<4, Mg<2  N: Carb controlled  VTE Ppx: None  GI: not needed  C: Full Code  D: RMF    Problem: Nutrition, metabolism, and development symptoms - wound consult ordered Pt with reported hx of chronic LLE cellulitis. Was prescribed 10d course of cefuroxime on 9/12, states that she took 8 days before self-discontinuing. Notes that she often picks at her scabs/wounds.  Afebrile, no leukocytosis. No lymphatic tracking, no inguinal LAD.  - wound care consult placed

## 2022-09-22 NOTE — PATIENT PROFILE ADULT - FALL HARM RISK - HARM RISK INTERVENTIONS

## 2022-09-22 NOTE — ED ADULT NURSE NOTE - SUICIDE SCREENING QUESTION 3
----- Message from Carolynn Duque NP sent at 4/30/2020  3:00 PM CDT -----  Regarding: print EPAP RX  RX for EPAP may have printed out if so could you email it to him. He is in [place] and will try to get epap from DME there. If not please print out and email it. Thx.   
EPAP was emailed as advised  
No

## 2022-09-22 NOTE — H&P ADULT - PROBLEM SELECTOR PLAN 9
F: Tolerating PO, no IVF  E: Replete K<4, Mg<2  N: Carb controlled  VTE Ppx: SCDs (right leg; avoid left leg due to chronic cellulitis)  GI: not needed  C: Full Code  D: RMF    Problem: Nutrition, metabolism, and development symptoms

## 2022-09-22 NOTE — H&P ADULT - HISTORY OF PRESENT ILLNESS
CC: "  HPI  Denies fevers, chills, cough, chest pain, dyspnea, nausea, headache, diarrhea, sick contactschange in urinary or bowel habits      In the ED:    - VS: Tmax: , HR:  , BP:  , RR:  , O2:    %     - Pertinent Labs:     - Imaging: CXR: CT: US: Cath: EKG:     - Treatment/interventions:     PMHx:   PSHx:  Meds: See med rec  Allergies:  Social: see below      73 y/o Female with a PMHx of Iron Deficiency Anemia (transfused in past), Bipolar Disorder, HTN, pre-DM, Hypothyroidism, A-fib (not on Eliquis due to Anemia), and LLE Chronic cellulitis sent from pcp for anemia from blood work 2 days ago. Reports feeling generalized weakness, lightheadedness and fatigue for the past 5 days. Associated with sob. Symptoms typical of when her blood counts are low. Denies fever, chills, cough, cp, black/bloody stools. EGD/colonoscopy a few weeks ago normal. Pt states she has been worked up for this anemia for the past year and no one can figure out what is wrong with her.      In the ED:    - VS: Tmax: , HR:  , BP:  , RR:  , O2:    %     - Pertinent Labs:     - Imaging: CXR: CT: US: Cath: EKG:     - Treatment/interventions:     PMHx:   PSHx:  Meds: See med rec  Allergies:  Social: see below      73 y/o Female with a PMHx of Iron Deficiency Anemia (transfused in past), Bipolar Disorder, HTN, pre-DM, Hypothyroidism, A-fib (not on Eliquis due to Anemia), and LLE Chronic cellulitis sent from pcp for anemia from blood work 2 days ago. Reports feeling generalized weakness, lightheadedness and fatigue for the past 5 days. Associated with sob. Symptoms typical of when her blood counts are low. Denies fever, chills, cough, cp, black/bloody stools. EGD/colonoscopy a few weeks ago normal. Pt states she has been worked up for this anemia for the past year and no one can figure out what is wrong with her.    72F with PMH WILLY (transfusions in past), "agitated depression," ADHD, hypothyroidism, pre-DM, aortic stenosis, chronic LLE cellulitis who was sent from outside provider for abnormal blood work 2 days ago. Pt states that she was in her usual state of health until she felt progressively increased fatigue, generalized weakness, DUNCAN, and lightheadedness for the last 5 days. She states that these symptoms felt identical to past episodes where she was anemic. She states that she has undergone full hematologic workup in the past with her hematologist (Dr. Rand), other than BMBx. She states that she had an EGD/colonscopy 3 weeks ago, which was unyielding for GIB. She notes 40lb weight loss over the last year, but states that this coincided with her starting metformin for pre-DM. Otherwise, denies any family history of hematologic disorders. Denies fevers, chills, chest pain, palpitations, n/v/d, hematemesis, hematochezia, melena, hematuria, or easy bruising/bleeding. Denies any recent falls, syncope, or trauma.     In the ED:    - VS: Tmax: 98, HR: 83, BP: 126/74, RR: 17, O2: 97% RA    - Pertinent Labs: hgb 6.7, platelets 524, INR 1.34, PTT 39    - Imaging: None    - Treatment/interventions: 1u pRBC 72F with PMH WILLY (transfusions in past), "agitated depression," ADHD, hypothyroidism, pre-DM, aortic stenosis, hx Afib (not on Eliquis due to anemia), hx DVT (8/2021 at time of hip replacement), chronic LLE cellulitis who was sent from outside provider for abnormal blood work 2 days ago. Pt states that she was in her usual state of health until she felt progressively increased fatigue, generalized weakness, DUNCAN, and lightheadedness for the last 5 days. She states that these symptoms felt identical to past episodes where she was anemic. She states that she has undergone full hematologic workup in the past with her hematologist (Dr. Rand), other than BMBx. She states that she had an EGD/colonscopy 3 weeks ago, which was unyielding for GIB. She notes 40lb weight loss over the last year, but states that this coincided with her starting metformin for pre-DM. Otherwise, denies any family history of hematologic disorders. Denies fevers, chills, SOB, chest pain, palpitations, n/v/d, hematemesis, hematochezia, melena, hematuria, or easy bruising/bleeding. Denies any recent falls, syncope, or trauma.     In the ED:    - VS: Tmax: 98, HR: 83, BP: 126/74, RR: 17, O2: 97% RA    - Pertinent Labs: hgb 6.7, platelets 524, INR 1.34, PTT 39    - Imaging: None    - Treatment/interventions: 1u pRBC ordered (not yet administered)

## 2022-09-22 NOTE — H&P ADULT - NSHPSOCIALHISTORY_GEN_ALL_CORE
Social alcohol use. Denies tobacco or illicit drug use. Lives alone. HHA 20hr/week Social alcohol use. Denies tobacco or illicit drug use.    Lives alone. HHA 20hr/week. Ambulates with rollator.

## 2022-09-22 NOTE — H&P ADULT - PROBLEM SELECTOR PLAN 7
F: Tolerating PO, no IVF  E: Replete K<4, Mg<2  N: Carb controlled  VTE Ppx: None  GI: not needed  C: Full Code  D: RMF    Problem: Nutrition, metabolism, and development symptoms Known PMH ADHD.  - continue home Concerta 54mg daily Known PMH ADHD.  - holding home Concerta 54mg daily (not carried by pharmacy)

## 2022-09-22 NOTE — H&P ADULT - PROBLEM SELECTOR PLAN 6
Pt follows with PCP/cardiologist Dr. Arnold  - outpatient follow up Pt follows with PCP/cardiologist Dr. Arnold.  - outpatient follow up Known PMH hypothyroidism.  - continue home synthroid 112mcg daily  - continue home Cytomel 12.5mg

## 2022-09-22 NOTE — ED ADULT TRIAGE NOTE - CHIEF COMPLAINT QUOTE
Pt walked into ED per Dr. Arnold (pcp) due to low hgb drawn x2 days ago. Pt endorses weakness, light headed, and fatigue. +pale, hx of anemia and blood transfusions. Denies any active bleeding, no melena.

## 2022-09-22 NOTE — H&P ADULT - ASSESSMENT
72F with PMH WILLY (transfusions in past), "agitated depression," ADHD, hypothyroidism, pre-DM, aortic stenosis, hx Afib (not on Eliquis due to anemia), hx DVT (8/2021 at time of hip replacement), chronic LLE cellulitis who was sent from outside provider for abnormal blood work 2 days ago, found to have hgb 6.7, admitted for symptomatic anemia and transfusion.

## 2022-09-23 ENCOUNTER — TRANSCRIPTION ENCOUNTER (OUTPATIENT)
Age: 72
End: 2022-09-23

## 2022-09-23 VITALS
HEART RATE: 72 BPM | OXYGEN SATURATION: 97 % | DIASTOLIC BLOOD PRESSURE: 69 MMHG | TEMPERATURE: 98 F | SYSTOLIC BLOOD PRESSURE: 114 MMHG | RESPIRATION RATE: 17 BRPM

## 2022-09-23 LAB
A1C WITH ESTIMATED AVERAGE GLUCOSE RESULT: 4.7 % — SIGNIFICANT CHANGE UP (ref 4–5.6)
ALBUMIN SERPL ELPH-MCNC: 3.3 G/DL — SIGNIFICANT CHANGE UP (ref 3.3–5)
ALP SERPL-CCNC: 84 U/L — SIGNIFICANT CHANGE UP (ref 40–120)
ALT FLD-CCNC: 12 U/L — SIGNIFICANT CHANGE UP (ref 10–45)
ANION GAP SERPL CALC-SCNC: 10 MMOL/L — SIGNIFICANT CHANGE UP (ref 5–17)
AST SERPL-CCNC: 17 U/L — SIGNIFICANT CHANGE UP (ref 10–40)
BASOPHILS # BLD AUTO: 0.01 K/UL — SIGNIFICANT CHANGE UP (ref 0–0.2)
BASOPHILS # BLD AUTO: 0.02 K/UL — SIGNIFICANT CHANGE UP (ref 0–0.2)
BASOPHILS NFR BLD AUTO: 0.2 % — SIGNIFICANT CHANGE UP (ref 0–2)
BASOPHILS NFR BLD AUTO: 0.4 % — SIGNIFICANT CHANGE UP (ref 0–2)
BILIRUB SERPL-MCNC: 0.2 MG/DL — SIGNIFICANT CHANGE UP (ref 0.2–1.2)
BUN SERPL-MCNC: 8 MG/DL — SIGNIFICANT CHANGE UP (ref 7–23)
CALCIUM SERPL-MCNC: 8.5 MG/DL — SIGNIFICANT CHANGE UP (ref 8.4–10.5)
CHLORIDE SERPL-SCNC: 110 MMOL/L — HIGH (ref 96–108)
CO2 SERPL-SCNC: 23 MMOL/L — SIGNIFICANT CHANGE UP (ref 22–31)
CREAT SERPL-MCNC: 0.59 MG/DL — SIGNIFICANT CHANGE UP (ref 0.5–1.3)
EGFR: 96 ML/MIN/1.73M2 — SIGNIFICANT CHANGE UP
EOSINOPHIL # BLD AUTO: 0.16 K/UL — SIGNIFICANT CHANGE UP (ref 0–0.5)
EOSINOPHIL # BLD AUTO: 0.17 K/UL — SIGNIFICANT CHANGE UP (ref 0–0.5)
EOSINOPHIL NFR BLD AUTO: 3.2 % — SIGNIFICANT CHANGE UP (ref 0–6)
EOSINOPHIL NFR BLD AUTO: 3.5 % — SIGNIFICANT CHANGE UP (ref 0–6)
ESTIMATED AVERAGE GLUCOSE: 88 MG/DL — SIGNIFICANT CHANGE UP (ref 68–114)
GLUCOSE BLDC GLUCOMTR-MCNC: 106 MG/DL — HIGH (ref 70–99)
GLUCOSE SERPL-MCNC: 98 MG/DL — SIGNIFICANT CHANGE UP (ref 70–99)
HCT VFR BLD CALC: 24.4 % — LOW (ref 34.5–45)
HCT VFR BLD CALC: 27 % — LOW (ref 34.5–45)
HGB BLD-MCNC: 7.1 G/DL — LOW (ref 11.5–15.5)
HGB BLD-MCNC: 7.4 G/DL — LOW (ref 11.5–15.5)
IMM GRANULOCYTES NFR BLD AUTO: 0 % — SIGNIFICANT CHANGE UP (ref 0–0.9)
IMM GRANULOCYTES NFR BLD AUTO: 0.4 % — SIGNIFICANT CHANGE UP (ref 0–0.9)
LYMPHOCYTES # BLD AUTO: 0.91 K/UL — LOW (ref 1–3.3)
LYMPHOCYTES # BLD AUTO: 1.47 K/UL — SIGNIFICANT CHANGE UP (ref 1–3.3)
LYMPHOCYTES # BLD AUTO: 18.6 % — SIGNIFICANT CHANGE UP (ref 13–44)
LYMPHOCYTES # BLD AUTO: 29.3 % — SIGNIFICANT CHANGE UP (ref 13–44)
MAGNESIUM SERPL-MCNC: 2.3 MG/DL — SIGNIFICANT CHANGE UP (ref 1.6–2.6)
MCHC RBC-ENTMCNC: 24.1 PG — LOW (ref 27–34)
MCHC RBC-ENTMCNC: 24.9 PG — LOW (ref 27–34)
MCHC RBC-ENTMCNC: 27.4 GM/DL — LOW (ref 32–36)
MCHC RBC-ENTMCNC: 29.1 GM/DL — LOW (ref 32–36)
MCV RBC AUTO: 85.6 FL — SIGNIFICANT CHANGE UP (ref 80–100)
MCV RBC AUTO: 87.9 FL — SIGNIFICANT CHANGE UP (ref 80–100)
MONOCYTES # BLD AUTO: 0.61 K/UL — SIGNIFICANT CHANGE UP (ref 0–0.9)
MONOCYTES # BLD AUTO: 0.72 K/UL — SIGNIFICANT CHANGE UP (ref 0–0.9)
MONOCYTES NFR BLD AUTO: 12.5 % — SIGNIFICANT CHANGE UP (ref 2–14)
MONOCYTES NFR BLD AUTO: 14.4 % — HIGH (ref 2–14)
NEUTROPHILS # BLD AUTO: 2.65 K/UL — SIGNIFICANT CHANGE UP (ref 1.8–7.4)
NEUTROPHILS # BLD AUTO: 3.15 K/UL — SIGNIFICANT CHANGE UP (ref 1.8–7.4)
NEUTROPHILS NFR BLD AUTO: 52.9 % — SIGNIFICANT CHANGE UP (ref 43–77)
NEUTROPHILS NFR BLD AUTO: 64.6 % — SIGNIFICANT CHANGE UP (ref 43–77)
NRBC # BLD: 0 /100 WBCS — SIGNIFICANT CHANGE UP (ref 0–0)
NRBC # BLD: 0 /100 WBCS — SIGNIFICANT CHANGE UP (ref 0–0)
PHOSPHATE SERPL-MCNC: 4.1 MG/DL — SIGNIFICANT CHANGE UP (ref 2.5–4.5)
PLATELET # BLD AUTO: 381 K/UL — SIGNIFICANT CHANGE UP (ref 150–400)
PLATELET # BLD AUTO: 442 K/UL — HIGH (ref 150–400)
POTASSIUM SERPL-MCNC: 3.9 MMOL/L — SIGNIFICANT CHANGE UP (ref 3.5–5.3)
POTASSIUM SERPL-SCNC: 3.9 MMOL/L — SIGNIFICANT CHANGE UP (ref 3.5–5.3)
PROT SERPL-MCNC: 6.2 G/DL — SIGNIFICANT CHANGE UP (ref 6–8.3)
RBC # BLD: 2.85 M/UL — LOW (ref 3.8–5.2)
RBC # BLD: 3.07 M/UL — LOW (ref 3.8–5.2)
RBC # FLD: 22.4 % — HIGH (ref 10.3–14.5)
RBC # FLD: 22.4 % — HIGH (ref 10.3–14.5)
SODIUM SERPL-SCNC: 143 MMOL/L — SIGNIFICANT CHANGE UP (ref 135–145)
WBC # BLD: 4.88 K/UL — SIGNIFICANT CHANGE UP (ref 3.8–10.5)
WBC # BLD: 5.01 K/UL — SIGNIFICANT CHANGE UP (ref 3.8–10.5)
WBC # FLD AUTO: 4.88 K/UL — SIGNIFICANT CHANGE UP (ref 3.8–10.5)
WBC # FLD AUTO: 5.01 K/UL — SIGNIFICANT CHANGE UP (ref 3.8–10.5)

## 2022-09-23 PROCEDURE — 99285 EMERGENCY DEPT VISIT HI MDM: CPT

## 2022-09-23 PROCEDURE — 80053 COMPREHEN METABOLIC PANEL: CPT

## 2022-09-23 PROCEDURE — 83735 ASSAY OF MAGNESIUM: CPT

## 2022-09-23 PROCEDURE — 85025 COMPLETE CBC W/AUTO DIFF WBC: CPT

## 2022-09-23 PROCEDURE — 86922 COMPATIBILITY TEST ANTIGLOB: CPT

## 2022-09-23 PROCEDURE — 99239 HOSP IP/OBS DSCHRG MGMT >30: CPT

## 2022-09-23 PROCEDURE — 81003 URINALYSIS AUTO W/O SCOPE: CPT

## 2022-09-23 PROCEDURE — 36415 COLL VENOUS BLD VENIPUNCTURE: CPT

## 2022-09-23 PROCEDURE — 86901 BLOOD TYPING SEROLOGIC RH(D): CPT

## 2022-09-23 PROCEDURE — 85730 THROMBOPLASTIN TIME PARTIAL: CPT

## 2022-09-23 PROCEDURE — 97161 PT EVAL LOW COMPLEX 20 MIN: CPT

## 2022-09-23 PROCEDURE — 82962 GLUCOSE BLOOD TEST: CPT

## 2022-09-23 PROCEDURE — 84100 ASSAY OF PHOSPHORUS: CPT

## 2022-09-23 PROCEDURE — 93010 ELECTROCARDIOGRAM REPORT: CPT

## 2022-09-23 PROCEDURE — 86902 BLOOD TYPE ANTIGEN DONOR EA: CPT

## 2022-09-23 PROCEDURE — 83036 HEMOGLOBIN GLYCOSYLATED A1C: CPT

## 2022-09-23 PROCEDURE — P9016: CPT

## 2022-09-23 PROCEDURE — 86850 RBC ANTIBODY SCREEN: CPT

## 2022-09-23 PROCEDURE — 36430 TRANSFUSION BLD/BLD COMPNT: CPT

## 2022-09-23 PROCEDURE — 87635 SARS-COV-2 COVID-19 AMP PRB: CPT

## 2022-09-23 PROCEDURE — 85610 PROTHROMBIN TIME: CPT

## 2022-09-23 PROCEDURE — 93005 ELECTROCARDIOGRAM TRACING: CPT

## 2022-09-23 PROCEDURE — 86900 BLOOD TYPING SEROLOGIC ABO: CPT

## 2022-09-23 RX ADMIN — LIOTHYRONINE SODIUM 12.5 MICROGRAM(S): 25 TABLET ORAL at 06:16

## 2022-09-23 RX ADMIN — Medication 112 MICROGRAM(S): at 06:16

## 2022-09-23 NOTE — DISCHARGE NOTE PROVIDER - PROVIDER TOKENS
PROVIDER:[TOKEN:[[id number]],FOLLOWUP:[1 week]] PROVIDER:[TOKEN:[[id number]],FOLLOWUP:[1 week]],PROVIDER:[TOKEN:[[id number]],SCHEDULEDAPPT:[09/30/2022],SCHEDULEDAPPTTIME:[11:40 AM],ESTABLISHEDPATIENT:[T]]

## 2022-09-23 NOTE — DISCHARGE NOTE PROVIDER - HOSPITAL COURSE
Patient is a 72F with PMH WILLY (transfusions in past), "agitated depression," ADHD, hypothyroidism, pre-DM, aortic stenosis, hx Afib (not on Eliquis due to anemia), hx DVT (8/2021 at time of hip replacement), chronic LLE cellulitis, sent from outside provider for abnormal blood work 2 days ago and fatigue, generalized weakness, DUNCAN, and lightheadedness for the last 5 days, admitted for symptomatic anemia.     Inpatient treatment course:   In the hospital, pt received 1U pRBC with resolution of symptoms and appropriate response in Hb 6.7 > 7.4. Patient was medically optimized, stable and ready for discharge. Plan of care and return precautions were discussed with the patient who verbally stated understanding.     Problem List/Main Diagnoses:   1. Symptomatic anemia.   Hb 6.7 mg/dL (baseline 8.2). MCV 83.7. RDW 23.5. Unclear etiology. No signs of active bleeding. HALLE negative. Reportedly has had EGD/colonoscopy done within last month, unyielding. Follows with hematologist, Dr. Rand. Has reportedly had thorough anemia workup done in past, excluding BMBx.  - s/p 1U pRBC with Hb 6.7 > 7.4    2. Chronic cellulitis.   Pt with reported hx of chronic LLE cellulitis. Was prescribed 10d course of cefuroxime on 9/12, states that she took 8 days before self-discontinuing. Notes that she often picks at her scabs/wounds. Afebrile, no leukocytosis. No lymphatic tracking, no inguinal LAD.  - Being managed by outpt doctor Selena Pearce; pt will f/u outpt next week     3. Major depressive disorder.   Follows with her psychiatrist, Dr. Martell. Doses confirmed with her home pharmacy.  - Pt was continued on home meds Wellbutrin 150mg qd and Effexor 300mg qd  - Pt will follow up with home psychiatrist Dr. Moscoso.     4. Insomnia.   Takes home Klonopin, Seroquel, Topamax, lithium, melatonin. Doses confirmed with her home pharmacy.  - Pt was continued on home Klonopin 3mg qH, Seroquel 75mg qHS, topiramate 200mg qHS, lithium 150mg qHS.    5. Pre-diabetes.   On home metformin ER 750mg BID. Last hgb A1c 4.8% in 3/2022.  - mISS while inpatient.    6. Hypothyroidism.   - Pt was continued on home synthroid 112mcg daily and Cytomel 12.5mg.    7. ADHD (attention deficit hyperactivity disorder).   - Held home Concerta 54mg daily (not carried by pharmacy).    8. AS (aortic stenosis).   Pt follows with PCP/cardiologist Dr. Arnold; will f/u outpt    New medications/therapies: none  New lines/hardware: none  Labs to be followed outpatient: CBC   Exam to be followed outpatient: physical exam    Discharge plan: discharge to home     Physical Exam on Discharge:  GENERAL: Awake, alert and interactive, no acute distress, appears comfortable  NEURO: A&Ox4, no focal deficits, 5/5 strength in all ext  HEENT: Normocephalic, atraumatic, no conjunctivitis or scleral icterus, oral mucosa moist  NECK: Supple  CARDIAC: Regular rate and rhythm, +S1/S2, no murmurs/rubs/gallops  PULM: Breathing comfortably on RA, clear to auscultation bilaterally, no wheezes/rales/rhonchi  ABDOMEN: Soft, nontender, nondistended, +bs  MSK: Range of motion grossly intact  SKIN: Warm and dry, no rashes, lesions  VASC: Cap refil < 2 sec, 2+ peripheral pulses, no edema, no LE tenderness  Psych: Appropriate affect Patient is a 72F with PMH WILLY (transfusions in past), "agitated depression," ADHD, hypothyroidism, pre-DM, aortic stenosis, hx Afib (not on Eliquis due to anemia), hx DVT (8/2021 at time of hip replacement), chronic LLE cellulitis, sent from outside provider for abnormal blood work 2 days ago and fatigue, generalized weakness, DUNCAN, and lightheadedness for the last 5 days, admitted for symptomatic anemia.     Inpatient treatment course:   In the hospital, pt received 1U pRBC with resolution of symptoms and appropriate response in Hb 6.7 > 7.4. Patient was medically optimized, stable and ready for discharge. Plan of care and return precautions were discussed with the patient who verbally stated understanding.     Problem List/Main Diagnoses:   1. Symptomatic anemia.   Hb 6.7 mg/dL (baseline 8.2). MCV 83.7. RDW 23.5. Unclear etiology. No signs of active bleeding. HALLE negative. Reportedly has had EGD/colonoscopy done within last month, unyielding. Follows with hematologist, Dr. Rand. Has reportedly had thorough anemia workup done in past, excluding BMBx.  - s/p 1U pRBC with Hb 6.7 > 7.4    2. Chronic cellulitis.   Pt with reported hx of chronic LLE cellulitis. Was prescribed 10d course of cefuroxime on 9/12, states that she took 8 days before self-discontinuing. Notes that she often picks at her scabs/wounds. Afebrile, no leukocytosis. No lymphatic tracking, no inguinal LAD.  - Being managed by outpt doctor Selena Pearce; pt will f/u outpt next week     3. Major depressive disorder.   Follows with her psychiatrist, Dr. Martell. Doses confirmed with her home pharmacy.  - Pt was continued on home meds Wellbutrin 150mg qd and Effexor 300mg qd  - Pt will follow up with psychiatrist Dr. Martell.     4. Insomnia.   Takes home Klonopin, Seroquel, Topamax, lithium, melatonin. Doses confirmed with her home pharmacy.  - Pt was continued on home Klonopin 3mg qH, Seroquel 75mg qHS, topiramate 200mg qHS, lithium 150mg qHS.    5. Pre-diabetes.   On home metformin ER 750mg BID. Last hgb A1c 4.8% in 3/2022.  - mISS while inpatient.    6. Hypothyroidism.   - Pt was continued on home synthroid 112mcg daily and Cytomel 12.5mg.    7. ADHD (attention deficit hyperactivity disorder).   - Held home Concerta 54mg daily (not carried by pharmacy).    8. AS (aortic stenosis).   Pt follows with PCP/cardiologist Dr. Arnold; will f/u outpt    New medications/therapies: none  New lines/hardware: none  Labs to be followed outpatient: CBC   Exam to be followed outpatient: physical exam    Discharge plan: discharge to home     Physical Exam on Discharge:  GENERAL: Awake, alert and interactive, no acute distress, appears comfortable  NEURO: A&Ox4, no focal deficits, 5/5 strength in all ext  HEENT: Normocephalic, atraumatic, no conjunctivitis or scleral icterus, oral mucosa moist  NECK: Supple  CARDIAC: Regular rate and rhythm, +S1/S2, no murmurs/rubs/gallops  PULM: Breathing comfortably on RA, clear to auscultation bilaterally, no wheezes/rales/rhonchi  ABDOMEN: Soft, nontender, nondistended, +bs  MSK: Range of motion grossly intact  SKIN: Warm and dry, no rashes, lesions  VASC: Cap refil < 2 sec, 2+ peripheral pulses, no edema, no LE tenderness  Psych: Appropriate affect Patient AMA'd. The patient wishes to leave against medical advice.  I have discussed the risks, benefits and alternatives (including the possibility of worsening of disease, pain, permanent disability, and/or death) with the patient and his/her family (if available).  The patient voices understanding of these risks, benefits, and alternatives and still wishes to sign out against medical advice.  The patient is awake, alert, oriented  x 3 and has demonstrated capacity to refuse/direct care.  I have advised the patient that they can and should return immediately should they develop any worse/different/additional symptoms, or if they change their mind and want to continue their care.      Patient is a 72F with PMH WILLY (transfusions in past), "agitated depression," ADHD, hypothyroidism, pre-DM, aortic stenosis, hx Afib (not on Eliquis due to anemia), hx DVT (8/2021 at time of hip replacement), chronic LLE cellulitis, sent from outside provider for abnormal blood work 2 days ago and fatigue, generalized weakness, DUNCAN, and lightheadedness for the last 5 days, admitted for symptomatic anemia.     Inpatient treatment course:   In the hospital, pt received 1U pRBC with resolution of symptoms and appropriate response in Hb 6.7 > 7.4. Patient was medically optimized, stable and ready for discharge. Plan of care and return precautions were discussed with the patient who verbally stated understanding.     Problem List/Main Diagnoses:   1. Symptomatic anemia.   Hb 6.7 mg/dL (baseline 8.2). MCV 83.7. RDW 23.5. Unclear etiology. No signs of active bleeding. HALLE negative. Reportedly has had EGD/colonoscopy done within last month, unyielding. Follows with hematologist, Dr. Rand. Has reportedly had thorough anemia workup done in past, excluding BMBx.  - s/p 1U pRBC with Hb 6.7 > 7.4    2. Chronic cellulitis.   Pt with reported hx of chronic LLE cellulitis. Was prescribed 10d course of cefuroxime on 9/12, states that she took 8 days before self-discontinuing. Notes that she often picks at her scabs/wounds. Afebrile, no leukocytosis. No lymphatic tracking, no inguinal LAD.  - Being managed by outpt doctor Selena Pearce; pt will f/u outpt next week     3. Major depressive disorder.   Follows with her psychiatrist, Dr. Martell. Doses confirmed with her home pharmacy.  - Pt was continued on home meds Wellbutrin 150mg qd and Effexor 300mg qd  - Pt will follow up with psychiatrist Dr. Martell.     4. Insomnia.   Takes home Klonopin, Seroquel, Topamax, lithium, melatonin. Doses confirmed with her home pharmacy.  - Pt was continued on home Klonopin 3mg qH, Seroquel 75mg qHS, topiramate 200mg qHS, lithium 150mg qHS.    5. Pre-diabetes.   On home metformin ER 750mg BID. Last hgb A1c 4.8% in 3/2022.  - mISS while inpatient.    6. Hypothyroidism.   - Pt was continued on home synthroid 112mcg daily and Cytomel 12.5mg.    7. ADHD (attention deficit hyperactivity disorder).   - Held home Concerta 54mg daily (not carried by pharmacy).    8. AS (aortic stenosis).   Pt follows with PCP/cardiologist Dr. Arnold; will f/u outpt    New medications/therapies: none  New lines/hardware: none  Labs to be followed outpatient: CBC   Exam to be followed outpatient: physical exam    Discharge plan: discharge to home     Physical Exam on Discharge:  GENERAL: Awake, alert and interactive, no acute distress, appears comfortable  NEURO: A&Ox4, no focal deficits, 5/5 strength in all ext  HEENT: Normocephalic, atraumatic, no conjunctivitis or scleral icterus, oral mucosa moist  NECK: Supple  CARDIAC: Regular rate and rhythm, +S1/S2, no murmurs/rubs/gallops  PULM: Breathing comfortably on RA, clear to auscultation bilaterally, no wheezes/rales/rhonchi  ABDOMEN: Soft, nontender, nondistended, +bs  MSK: Range of motion grossly intact  SKIN: Warm and dry, no rashes, lesions  VASC: Cap refil < 2 sec, 2+ peripheral pulses, no edema, no LE tenderness  Psych: Appropriate affect

## 2022-09-23 NOTE — DISCHARGE NOTE PROVIDER - NSDCFUADDAPPT_GEN_ALL_CORE_FT
1. Please follow up with Dr. Rand within 1 week of discharge    2.  1. Please follow up with Dr. Rand within 1 week of discharge    2. Please follow up with Dr. Moody (your Primary Care Physician) on 09/30/22 at 11:40AM

## 2022-09-23 NOTE — PROGRESS NOTE ADULT - PROBLEM SELECTOR PLAN 1
Hb 6.7 mg/dL (baseline 8.2). MCV 83.7. RDW 23.5. Unclear etiology. No signs of active bleeding. HALLE negative.  Reportedly has had EGD/colonoscopy done within last month, unyielding. Follows with hematologist, Dr. Rand. Has reportedly had thorough anemia workup done in past, excluding BMBx.  - transfuse 1u pRBC  - check post-transfusion CBC  - trend hgb in AM  - F/u Fe studies  - maintain active type & screen  - Transfuse if Hgb <7  - consider GI consult if HD unstable  - can notify Dr. Rand in AM Hb 6.7 mg/dL (baseline 8.2). MCV 83.7. RDW 23.5. Unclear etiology. No signs of active bleeding. HALLE negative.  Reportedly has had EGD/colonoscopy done within last month, unyielding. Follows with hematologist, Dr. Rand. Has reportedly had thorough anemia workup done in past, excluding BMBx.    - s/p 1U pRBC Hb 6.7 > 7.1  - Trend CBC  - F/u Fe studies  - maintain active type & screen  - Transfuse if Hgb <7  - consider GI consult if HD unstable  - reach out to Dr. Rand

## 2022-09-23 NOTE — PHYSICAL THERAPY INITIAL EVALUATION ADULT - ADDITIONAL COMMENTS
Pt reports living in apartment without MOSHE, alone. Reports having 20 hours of HHA assist per week. Reports ambulating with use of rollator.

## 2022-09-23 NOTE — DISCHARGE NOTE PROVIDER - CARE PROVIDER_API CALL
José Miguel Rand)  Hematology; Medical Oncology  [address], [address]  [place] [address]  Phone: [phone]  Fax: [phone]  Follow Up Time: 1 week   José Miguel Rand)  Hematology; Medical Oncology  [address], [address]  [place] [address]  Phone: [phone]  Fax: [phone]  Follow Up Time: 1 week    Bradley Moody)  Cardiovascular Disease; Internal Medicine; Nuclear Cardiology  [address] [place] [address]  Phone: [phone]  Fax: [phone]  Established Patient  Scheduled Appointment: 09/30/2022 11:40 AM

## 2022-09-23 NOTE — PHYSICAL THERAPY INITIAL EVALUATION ADULT - THERAPY FREQUENCY, PT EVAL
Patient educated on frequency of inpatient physical therapy at [facility], patient verbalized understanding./2-3x/week

## 2022-09-23 NOTE — PROGRESS NOTE ADULT - SUBJECTIVE AND OBJECTIVE BOX
OVERNIGHT EVENTS:   Post-transfusion Hb 6.7 > 7.1. No acute events overnight.    SUBJECTIVE:  Pt seen and examined at bedside. States she feels well no and has no symptoms - no dyspnea, no fatigue, no CP. However, pt states when she gets symptoms from anemia, it is only on exertion and she has not exerted herself yet or moved around so she is unsure if she is still experiencing any symptoms. No LLE pain - states it is chronic and is being managed by a cardiovascular doctor (Selena Pearce) at [facility].     VITAL SIGNS:  Vital Signs Last 24 Hrs  T(C): 36.6 (23 Sep 2022 06:03), Max: 36.7 (22 Sep 2022 11:57)  T(F): 97.8 (23 Sep 2022 06:03), Max: 98 (22 Sep 2022 11:57)  HR: 72 (23 Sep 2022 06:03) (60 - 83)  BP: 114/69 (23 Sep 2022 06:03) (110/65 - 139/80)  BP(mean): --  RR: 17 (23 Sep 2022 06:03) (17 - 19)  SpO2: 97% (23 Sep 2022 06:03) (97% - 98%)    Parameters below as of 23 Sep 2022 06:03  Patient On (Oxygen Delivery Method): room air        PHYSICAL EXAM:  General: NAD; speaking in full sentences  HEENT: NC/AT; PERRL; EOMI; poor dentition  Neck: supple; no JVD  Cardiac: RRR; +S1/S2; +midsystolic ejection murmur to RUSB  Pulm: CTA B/L; no W/R/R  GI: soft, NT/ND, +BS  Extremities:  no edema, clubbing or cyanosis  Vasc: 2+ radial, DP pulses B/L  Neuro: AAOx3; no focal deficits    MEDICATIONS:  MEDICATIONS  (STANDING):  buPROPion XL (24-Hour) . 150 milliGRAM(s) Oral daily  clonazePAM  Tablet 3 milliGRAM(s) Oral every 24 hours  insulin lispro (ADMELOG) corrective regimen sliding scale   SubCutaneous Before meals and at bedtime  levothyroxine 112 MICROGram(s) Oral daily  liothyronine 12.5 MICROGram(s) Oral daily  lithium 150 milliGRAM(s) Oral every 24 hours  QUEtiapine 75 milliGRAM(s) Oral every 24 hours  topiramate 200 milliGRAM(s) Oral every 24 hours  venlafaxine XR. 300 milliGRAM(s) Oral daily    MEDICATIONS  (PRN):  melatonin 10 milliGRAM(s) Oral at bedtime PRN Insomnia      ALLERGIES:  Allergies    Kiwi (Anaphylaxis)  No Known Drug Allergies    Intolerances        LABS:                        7.1    5.01  )-----------( 442      ( 23 Sep 2022 00:10 )             24.4     09-22    140  |  107  |  9   ----------------------------<  104<H>  3.9   |  23  |  0.61    Ca    8.8      22 Sep 2022 12:29    TPro  6.8  /  Alb  4.0  /  TBili  0.2  /  DBili  x   /  AST  15  /  ALT  11  /  AlkPhos  89  09-22    PT/INR - ( 22 Sep 2022 12:29 )   PT: 16.0 sec;   INR: 1.34          PTT - ( 22 Sep 2022 12:29 )  PTT:39.0 sec    RADIOLOGY & ADDITIONAL TESTS: Reviewed.

## 2022-09-23 NOTE — PROGRESS NOTE ADULT - PROBLEM SELECTOR PLAN 3
Follows with her psychiatrist, Dr. Martell. Doses confirmed with her home pharmacy.  - continue home Wellbutrin 150mg daily  - continue home Effexor 300mg daily

## 2022-09-23 NOTE — PROGRESS NOTE ADULT - PROBLEM SELECTOR PLAN 2
Pt with reported hx of chronic LLE cellulitis. Was prescribed 10d course of cefuroxime on 9/12, states that she took 8 days before self-discontinuing. Notes that she often picks at her scabs/wounds.  Afebrile, no leukocytosis. No lymphatic tracking, no inguinal LAD.  - wound care consult placed Pt with reported hx of chronic LLE cellulitis. Was prescribed 10d course of cefuroxime on 9/12, states that she took 8 days before self-discontinuing. Notes that she often picks at her scabs/wounds.  Afebrile, no leukocytosis. No lymphatic tracking, no inguinal LAD.    - f/u wound care

## 2022-09-23 NOTE — PROGRESS NOTE ADULT - PROBLEM SELECTOR PLAN 5
On home metformin ER 750mg BID. Last hgb A1c 4.8% in 3/2022.  - f/u A1c in AM  - mISS while inpatient

## 2022-09-23 NOTE — DISCHARGE NOTE NURSING/CASE MANAGEMENT/SOCIAL WORK - NSDCFUADDAPPT_GEN_ALL_CORE_FT
1. Please follow up with Dr. Rand within 1 week of discharge    2. Please follow up with Dr. Moody (your Primary Care Physician) on 09/30/22 at 11:40AM

## 2022-09-23 NOTE — DISCHARGE NOTE NURSING/CASE MANAGEMENT/SOCIAL WORK - PATIENT PORTAL LINK FT
You can access the FollowMyHealth Patient Portal offered by [facility] by registering at the following website: [email or web address]. By joining [facility]’s FollowMyHealth portal, you will also be able to view your health information using other applications (apps) compatible with our system.

## 2022-09-23 NOTE — DISCHARGE NOTE PROVIDER - CARE PROVIDERS DIRECT ADDRESSES
,DirectAddress_Unknown ,DirectAddress_Unknown,[email or web address]

## 2022-09-23 NOTE — DISCHARGE NOTE PROVIDER - NSDCMRMEDTOKEN_GEN_ALL_CORE_FT
buPROPion 150 mg/24 hours (XL) oral tablet, extended release: 1 tab(s) orally every 24 hours  Cytomel 25 mcg oral tablet: 0.5 tab(s) orally once a day  Deplin 15 mg oral tablet: 1 tab(s) orally once a day  ferrous sulfate 325 mg (65 mg elemental iron) oral tablet: 1 tab(s) orally once a day  KlonoPIN 1 mg oral tablet: 3 tab(s) orally once a day (at bedtime), As Needed  lithium carbonate extended release: 150 milligram(s) orally once a day (at bedtime)  melatonin 10 mg oral tablet, disintegratin tab(s) orally once a day (at bedtime)  metFORMIN 750 mg oral tablet, extended release: 1 tab(s) orally 2 times a day  SEROquel 25 mg oral tablet: 3 tab(s) orally once a day (at bedtime)  Synthroid 112 mcg (0.112 mg) oral tablet: 1 tab(s) orally once a day  topiramate 200 mg oral tablet: 1 tab(s) orally once a day (at bedtime)  venlafaxine 150 mg oral capsule, extended release: 2 cap(s) orally once a day

## 2022-09-23 NOTE — PROGRESS NOTE ADULT - PROBLEM SELECTOR PLAN 4
Takes home Klonopin, Seroquel, Topamax, lithium, melatonin. Doses confirmed with her home pharmacy.  - continue home Klonopin 3mg qHS  - continue home Seroquel 75mg qHS  - continue home topiramate 200mg qHS  - continue home lithium 150mg qHS

## 2022-09-23 NOTE — PHYSICAL THERAPY INITIAL EVALUATION ADULT - GENERAL OBSERVATIONS, REHAB EVAL
PT IE completed. Chart reviewed. Pt received semi-supine, NAD, +IV heplock, +LLE dressing C/D/I, +bed alarm. RN Cece cleared pt for PT.

## 2022-09-23 NOTE — DISCHARGE NOTE PROVIDER - NSDCCPCAREPLAN_GEN_ALL_CORE_FT
PRINCIPAL DISCHARGE DIAGNOSIS  Diagnosis: Anemia  Assessment and Plan of Treatment: Anemia is the medical term for when a person has too few red blood cells. Red blood cells are the cells in your blood that carry oxygen. If you have too few red blood cells, your body does not get all the oxygen it needs. Most people with anemia have no symptoms. They find out they have it after their doctor does blood tests for another reason. People who do have symptoms might feel tired or weak, especially if they try to exercise or have headaches. If you experience these symptoms you should see your primary care provider for further evaluation.   You presented to the hospital for symptomatic anemia, and you received a unit of blood with appropriate increase in your hemoglobin. Please follow up with your hematologist Dr. Rand within 1 week of discharge.       PRINCIPAL DISCHARGE DIAGNOSIS  Diagnosis: Anemia  Assessment and Plan of Treatment: Anemia is the medical term for when a person has too few red blood cells. Red blood cells are the cells in your blood that carry oxygen. If you have too few red blood cells, your body does not get all the oxygen it needs. Most people with anemia have no symptoms. They find out they have it after their doctor does blood tests for another reason. People who do have symptoms might feel tired or weak, especially if they try to exercise or have headaches. If you experience these symptoms you should see your primary care provider for further evaluation.   You presented to the hospital for symptomatic anemia, and you received a unit of blood with appropriate increase in your hemoglobin. Please follow up with your hematologist Dr. Rand within 1 week of discharge.      SECONDARY DISCHARGE DIAGNOSES  Diagnosis: Polypharmacy  Assessment and Plan of Treatment: You take a number of medications to help with anxiety. Please continue to follow up with your psychiatrist Dr. Martell for your psychiatric conditions. If you feel like you need additional psychiatric therapy, you can also obtain care at :  [facility])  www.[facility].org  Geared towards an aging population, with the understanding that their clients will need increasingly comprehensive services.  Must be over 55.    [address]  [place], [place]  [address] [phone]  Medicare, Medicaid, AARP, Aetna, Affinity, AmeriChoice, [facility], [employer], Cigna, [facility], BCBS, [place], GHI, HealthFirst, HIP, Optum, [place], United, ValueOptions  :  [facility]  [email or web address]  [address] [place] [address]  (corner of [address] and [address])  Telephone: [phone]  Medicare, Medicaid, most private insurance  :  [facility]  www.institute.org  Two locations:  -[address] ([address])  [place], [place] [address] [phone] -[address] [place], [place] [id number] [phone]      Diagnosis: Anxiety  Assessment and Plan of Treatment:

## 2022-09-26 DIAGNOSIS — F31.9 BIPOLAR DISORDER, UNSPECIFIED: ICD-10-CM

## 2022-09-26 DIAGNOSIS — Z20.822 CONTACT WITH AND (SUSPECTED) EXPOSURE TO COVID-19: ICD-10-CM

## 2022-09-26 DIAGNOSIS — D64.9 ANEMIA, UNSPECIFIED: ICD-10-CM

## 2022-09-26 DIAGNOSIS — E03.9 HYPOTHYROIDISM, UNSPECIFIED: ICD-10-CM

## 2022-09-26 DIAGNOSIS — Z79.84 LONG TERM (CURRENT) USE OF ORAL HYPOGLYCEMIC DRUGS: ICD-10-CM

## 2022-09-26 DIAGNOSIS — Z91.018 ALLERGY TO OTHER FOODS: ICD-10-CM

## 2022-09-26 DIAGNOSIS — Z79.899 OTHER LONG TERM (CURRENT) DRUG THERAPY: ICD-10-CM

## 2022-09-26 DIAGNOSIS — I48.91 UNSPECIFIED ATRIAL FIBRILLATION: ICD-10-CM

## 2022-09-26 DIAGNOSIS — R73.03 PREDIABETES: ICD-10-CM

## 2022-09-26 DIAGNOSIS — F90.9 ATTENTION-DEFICIT HYPERACTIVITY DISORDER, UNSPECIFIED TYPE: ICD-10-CM

## 2022-09-26 DIAGNOSIS — Z79.890 HORMONE REPLACEMENT THERAPY: ICD-10-CM

## 2022-09-26 DIAGNOSIS — I35.0 NONRHEUMATIC AORTIC (VALVE) STENOSIS: ICD-10-CM

## 2022-09-26 DIAGNOSIS — Z91.14 PATIENT'S OTHER NONCOMPLIANCE WITH MEDICATION REGIMEN: ICD-10-CM

## 2022-09-26 DIAGNOSIS — L03.116 CELLULITIS OF LEFT LOWER LIMB: ICD-10-CM

## 2022-09-26 DIAGNOSIS — Z86.718 PERSONAL HISTORY OF OTHER VENOUS THROMBOSIS AND EMBOLISM: ICD-10-CM

## 2022-09-26 DIAGNOSIS — G47.00 INSOMNIA, UNSPECIFIED: ICD-10-CM

## 2022-09-26 DIAGNOSIS — Z96.649 PRESENCE OF UNSPECIFIED ARTIFICIAL HIP JOINT: ICD-10-CM

## 2023-04-26 NOTE — PATIENT PROFILE ADULT - HOME ACCESSIBILITY CONCERNS
none Lot # (Optional): [id number] Performed By: EVENS Lopes Expiration Date (Optional): 2024-01-31 Urine Pregnancy Test Result: negative Detail Level: None

## 2023-06-15 ENCOUNTER — APPOINTMENT (OUTPATIENT)
Dept: HEART AND VASCULAR | Facility: CLINIC | Age: 73
End: 2023-06-15

## 2023-09-15 ENCOUNTER — EMERGENCY (EMERGENCY)
Facility: HOSPITAL | Age: 73
LOS: 1 days | Discharge: HOME | End: 2023-09-15
Attending: EMERGENCY MEDICINE | Admitting: EMERGENCY MEDICINE
Payer: MEDICARE

## 2023-09-15 VITALS
OXYGEN SATURATION: 97 % | HEART RATE: 82 BPM | DIASTOLIC BLOOD PRESSURE: 68 MMHG | WEIGHT: 235.01 LBS | HEIGHT: 63 IN | TEMPERATURE: 98 F | RESPIRATION RATE: 18 BRPM | SYSTOLIC BLOOD PRESSURE: 119 MMHG

## 2023-09-15 VITALS
DIASTOLIC BLOOD PRESSURE: 68 MMHG | HEART RATE: 80 BPM | RESPIRATION RATE: 16 BRPM | OXYGEN SATURATION: 98 % | TEMPERATURE: 98 F | SYSTOLIC BLOOD PRESSURE: 120 MMHG

## 2023-09-15 DIAGNOSIS — F41.9 ANXIETY DISORDER, UNSPECIFIED: ICD-10-CM

## 2023-09-15 DIAGNOSIS — Z96.643 PRESENCE OF ARTIFICIAL HIP JOINT, BILATERAL: ICD-10-CM

## 2023-09-15 DIAGNOSIS — Z98.890 OTHER SPECIFIED POSTPROCEDURAL STATES: Chronic | ICD-10-CM

## 2023-09-15 DIAGNOSIS — Z20.822 CONTACT WITH AND (SUSPECTED) EXPOSURE TO COVID-19: ICD-10-CM

## 2023-09-15 DIAGNOSIS — Z96.651 PRESENCE OF RIGHT ARTIFICIAL KNEE JOINT: ICD-10-CM

## 2023-09-15 DIAGNOSIS — S81.801A UNSPECIFIED OPEN WOUND, RIGHT LOWER LEG, INITIAL ENCOUNTER: ICD-10-CM
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
## 2023-09-15 DIAGNOSIS — E03.9 HYPOTHYROIDISM, UNSPECIFIED: ICD-10-CM

## 2023-09-15 DIAGNOSIS — I48.91 UNSPECIFIED ATRIAL FIBRILLATION: ICD-10-CM

## 2023-09-15 DIAGNOSIS — D64.9 ANEMIA, UNSPECIFIED: ICD-10-CM

## 2023-09-15 DIAGNOSIS — L03.116 CELLULITIS OF LEFT LOWER LIMB: ICD-10-CM
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
## 2023-09-15 DIAGNOSIS — Z96.651 PRESENCE OF RIGHT ARTIFICIAL KNEE JOINT: Chronic | ICD-10-CM

## 2023-09-15 DIAGNOSIS — F31.9 BIPOLAR DISORDER, UNSPECIFIED: ICD-10-CM

## 2023-09-15 DIAGNOSIS — F90.9 ATTENTION-DEFICIT HYPERACTIVITY DISORDER, UNSPECIFIED TYPE: ICD-10-CM

## 2023-09-15 DIAGNOSIS — S81.802A UNSPECIFIED OPEN WOUND, LEFT LOWER LEG, INITIAL ENCOUNTER: ICD-10-CM

## 2023-09-15 DIAGNOSIS — Z86.718 PERSONAL HISTORY OF OTHER VENOUS THROMBOSIS AND EMBOLISM: ICD-10-CM

## 2023-09-15 DIAGNOSIS — Z91.018 ALLERGY TO OTHER FOODS: ICD-10-CM

## 2023-09-15 DIAGNOSIS — Z85.3 PERSONAL HISTORY OF MALIGNANT NEOPLASM OF BREAST: ICD-10-CM

## 2023-09-15 DIAGNOSIS — I10 ESSENTIAL (PRIMARY) HYPERTENSION: ICD-10-CM

## 2023-09-15 DIAGNOSIS — Z79.84 LONG TERM (CURRENT) USE OF ORAL HYPOGLYCEMIC DRUGS: ICD-10-CM

## 2023-09-15 LAB
ANION GAP SERPL CALC-SCNC: 9 MMOL/L — SIGNIFICANT CHANGE UP (ref 5–17)
ANISOCYTOSIS BLD QL: SLIGHT — SIGNIFICANT CHANGE UP
APPEARANCE UR: CLEAR — SIGNIFICANT CHANGE UP
BACTERIA # UR AUTO: SIGNIFICANT CHANGE UP /HPF
BASOPHILS # BLD AUTO: 0.07 K/UL — SIGNIFICANT CHANGE UP (ref 0–0.2)
BASOPHILS NFR BLD AUTO: 0.9 % — SIGNIFICANT CHANGE UP (ref 0–2)
BILIRUB UR-MCNC: NEGATIVE — SIGNIFICANT CHANGE UP
BUN SERPL-MCNC: 13 MG/DL — SIGNIFICANT CHANGE UP (ref 7–23)
BURR CELLS BLD QL SMEAR: PRESENT — SIGNIFICANT CHANGE UP
CALCIUM SERPL-MCNC: 9.3 MG/DL — SIGNIFICANT CHANGE UP (ref 8.4–10.5)
CHLORIDE SERPL-SCNC: 106 MMOL/L — SIGNIFICANT CHANGE UP (ref 96–108)
CO2 SERPL-SCNC: 23 MMOL/L — SIGNIFICANT CHANGE UP (ref 22–31)
COLOR SPEC: YELLOW — SIGNIFICANT CHANGE UP
CREAT SERPL-MCNC: 0.67 MG/DL — SIGNIFICANT CHANGE UP (ref 0.5–1.3)
DACRYOCYTES BLD QL SMEAR: SLIGHT — SIGNIFICANT CHANGE UP
DIFF PNL FLD: ABNORMAL
EGFR: 93 ML/MIN/1.73M2 — SIGNIFICANT CHANGE UP
EOSINOPHIL # BLD AUTO: 0 K/UL — SIGNIFICANT CHANGE UP (ref 0–0.5)
EOSINOPHIL NFR BLD AUTO: 0 % — SIGNIFICANT CHANGE UP (ref 0–6)
EPI CELLS # UR: ABNORMAL /HPF (ref 0–5)
FLUAV AG NPH QL: SIGNIFICANT CHANGE UP
FLUBV AG NPH QL: SIGNIFICANT CHANGE UP
GLUCOSE SERPL-MCNC: 142 MG/DL — HIGH (ref 70–99)
GLUCOSE UR QL: NEGATIVE — SIGNIFICANT CHANGE UP
HCT VFR BLD CALC: 30.1 % — LOW (ref 34.5–45)
HGB BLD-MCNC: 9 G/DL — LOW (ref 11.5–15.5)
HYPOCHROMIA BLD QL: SLIGHT — SIGNIFICANT CHANGE UP
KETONES UR-MCNC: NEGATIVE — SIGNIFICANT CHANGE UP
LACTATE SERPL-SCNC: 1.1 MMOL/L — SIGNIFICANT CHANGE UP (ref 0.5–2)
LEUKOCYTE ESTERASE UR-ACNC: NEGATIVE — SIGNIFICANT CHANGE UP
LYMPHOCYTES # BLD AUTO: 0.8 K/UL — LOW (ref 1–3.3)
LYMPHOCYTES # BLD AUTO: 10.6 % — LOW (ref 13–44)
MACROCYTES BLD QL: SLIGHT — SIGNIFICANT CHANGE UP
MANUAL SMEAR VERIFICATION: SIGNIFICANT CHANGE UP
MCHC RBC-ENTMCNC: 24.7 PG — LOW (ref 27–34)
MCHC RBC-ENTMCNC: 29.9 GM/DL — LOW (ref 32–36)
MCV RBC AUTO: 82.7 FL — SIGNIFICANT CHANGE UP (ref 80–100)
MICROCYTES BLD QL: SLIGHT — SIGNIFICANT CHANGE UP
MONOCYTES # BLD AUTO: 1 K/UL — HIGH (ref 0–0.9)
MONOCYTES NFR BLD AUTO: 13.3 % — SIGNIFICANT CHANGE UP (ref 2–14)
NEUTROPHILS # BLD AUTO: 5.66 K/UL — SIGNIFICANT CHANGE UP (ref 1.8–7.4)
NEUTROPHILS NFR BLD AUTO: 75.2 % — SIGNIFICANT CHANGE UP (ref 43–77)
NITRITE UR-MCNC: NEGATIVE — SIGNIFICANT CHANGE UP
OVALOCYTES BLD QL SMEAR: SLIGHT — SIGNIFICANT CHANGE UP
PH UR: 6 — SIGNIFICANT CHANGE UP (ref 5–8)
PLAT MORPH BLD: ABNORMAL
PLATELET # BLD AUTO: 247 K/UL — SIGNIFICANT CHANGE UP (ref 150–400)
POIKILOCYTOSIS BLD QL AUTO: SLIGHT — SIGNIFICANT CHANGE UP
POLYCHROMASIA BLD QL SMEAR: SLIGHT — SIGNIFICANT CHANGE UP
POTASSIUM SERPL-MCNC: 3.8 MMOL/L — SIGNIFICANT CHANGE UP (ref 3.5–5.3)
POTASSIUM SERPL-SCNC: 3.8 MMOL/L — SIGNIFICANT CHANGE UP (ref 3.5–5.3)
PROT UR-MCNC: NEGATIVE MG/DL — SIGNIFICANT CHANGE UP
RBC # BLD: 3.64 M/UL — LOW (ref 3.8–5.2)
RBC # FLD: 17.9 % — HIGH (ref 10.3–14.5)
RBC BLD AUTO: ABNORMAL
RBC CASTS # UR COMP ASSIST: < 5 /HPF — SIGNIFICANT CHANGE UP
RSV RNA NPH QL NAA+NON-PROBE: SIGNIFICANT CHANGE UP
SARS-COV-2 RNA SPEC QL NAA+PROBE: SIGNIFICANT CHANGE UP
SODIUM SERPL-SCNC: 138 MMOL/L — SIGNIFICANT CHANGE UP (ref 135–145)
SP GR SPEC: <=1.005 — SIGNIFICANT CHANGE UP (ref 1–1.03)
UROBILINOGEN FLD QL: 0.2 E.U./DL — SIGNIFICANT CHANGE UP
WBC # BLD: 7.53 K/UL — SIGNIFICANT CHANGE UP (ref 3.8–10.5)
WBC # FLD AUTO: 7.53 K/UL — SIGNIFICANT CHANGE UP (ref 3.8–10.5)
WBC UR QL: < 5 /HPF — SIGNIFICANT CHANGE UP

## 2023-09-15 PROCEDURE — 87637 SARSCOV2&INF A&B&RSV AMP PRB: CPT

## 2023-09-15 PROCEDURE — 87040 BLOOD CULTURE FOR BACTERIA: CPT

## 2023-09-15 PROCEDURE — 99285 EMERGENCY DEPT VISIT HI MDM: CPT | Mod: FS

## 2023-09-15 PROCEDURE — 71045 X-RAY EXAM CHEST 1 VIEW: CPT

## 2023-09-15 PROCEDURE — 85025 COMPLETE CBC W/AUTO DIFF WBC: CPT

## 2023-09-15 PROCEDURE — 83605 ASSAY OF LACTIC ACID: CPT

## 2023-09-15 PROCEDURE — 99284 EMERGENCY DEPT VISIT MOD MDM: CPT | Mod: 25

## 2023-09-15 PROCEDURE — 96374 THER/PROPH/DIAG INJ IV PUSH: CPT

## 2023-09-15 PROCEDURE — 81001 URINALYSIS AUTO W/SCOPE: CPT

## 2023-09-15 PROCEDURE — 36415 COLL VENOUS BLD VENIPUNCTURE: CPT

## 2023-09-15 PROCEDURE — 96375 TX/PRO/DX INJ NEW DRUG ADDON: CPT

## 2023-09-15 PROCEDURE — 80048 BASIC METABOLIC PNL TOTAL CA: CPT

## 2023-09-15 PROCEDURE — 71045 X-RAY EXAM CHEST 1 VIEW: CPT | Mod: 26

## 2023-09-15 RX ORDER — GUAIFENESIN/DEXTROMETHORPHAN 600MG-30MG
10 TABLET, EXTENDED RELEASE 12 HR ORAL ONCE
Refills: 0 | Status: COMPLETED | OUTPATIENT
Start: 2023-09-15 | End: 2023-09-15

## 2023-09-15 RX ORDER — ONDANSETRON 8 MG/1
4 TABLET, FILM COATED ORAL ONCE
Refills: 0 | Status: COMPLETED | OUTPATIENT
Start: 2023-09-15 | End: 2023-09-15

## 2023-09-15 RX ORDER — VANCOMYCIN HCL 1 G
1500 VIAL (EA) INTRAVENOUS ONCE
Refills: 0 | Status: COMPLETED | OUTPATIENT
Start: 2023-09-15 | End: 2023-09-15

## 2023-09-15 RX ORDER — SODIUM CHLORIDE 9 MG/ML
1000 INJECTION INTRAMUSCULAR; INTRAVENOUS; SUBCUTANEOUS ONCE
Refills: 0 | Status: COMPLETED | OUTPATIENT
Start: 2023-09-15 | End: 2023-09-15

## 2023-09-15 RX ORDER — VANCOMYCIN HCL 1 G
1000 VIAL (EA) INTRAVENOUS ONCE
Refills: 0 | Status: DISCONTINUED | OUTPATIENT
Start: 2023-09-15 | End: 2023-09-15

## 2023-09-15 RX ADMIN — Medication 10 MILLILITER(S): at 11:03

## 2023-09-15 RX ADMIN — SODIUM CHLORIDE 1000 MILLILITER(S): 9 INJECTION INTRAMUSCULAR; INTRAVENOUS; SUBCUTANEOUS at 10:34

## 2023-09-15 RX ADMIN — Medication 300 MILLIGRAM(S): at 10:00

## 2023-09-15 RX ADMIN — ONDANSETRON 4 MILLIGRAM(S): 8 TABLET, FILM COATED ORAL at 10:34

## 2023-09-15 NOTE — ED PROVIDER NOTE - ATTENDING APP SHARED VISIT CONTRIBUTION OF CARE
I discussed the plan of care of the patient directly with the PA while the patient was in the Emergency Department. I have reviewed the ACP note and agree with the history, exam and plan of care. Here w/ acute on chronic LLE pain, with chronic wounds, has twice weekly wound care at home. Pain and swelling increased past week, discharge unchanged. on keflex now but only had one dose, comes in for further evaluation. VSS, leg appears infected. will plan for iv abx, labs, imaging. while in the ED noted to be coughing a lot, will check covid and cxr as well.

## 2023-09-15 NOTE — ED PROVIDER NOTE - CLINICAL SUMMARY MEDICAL DECISION MAKING FREE TEXT BOX
73 F pmh breast CA on antibody infusions, WILLY, ADHD, hypothyroid, predm, afib not on AC, chronic LLE wounds p/w pain/swelling to LLE x one week. reports fever yesterday.  started keflex for LLE cellulitis yesterday.  on exam vss, afebrile, LLE: multiple chronic superficial ulcerative wounds w/ granulation tissue over anterior shin, no purulent dc or induration or crepitus, localized erythema from inferior patella down to ankle over anterior aspect, FROM all joints, faint palpable DP/PT pulse, cap refill < 2 sec, SILT.  chronic wounds w/ + cellulitis, no e/o abscess.  will check labs/blood cx, urine, give dose vanco and possible dc w/ bactrim added vs admit for IV abx

## 2023-09-15 NOTE — ED ADULT NURSE NOTE - NSFALLUNIVINTERV_ED_ALL_ED
Bed/Stretcher in lowest position, wheels locked, appropriate side rails in place/Call bell, personal items and telephone in reach/Instruct patient to call for assistance before getting out of bed/chair/stretcher/Non-slip footwear applied when patient is off stretcher/[place] to call system/Physically safe environment - no spills, clutter or unnecessary equipment/Purposeful proactive rounding/Room/bathroom lighting operational, light cord in reach

## 2023-09-15 NOTE — ED ADULT NURSE NOTE - CHIEF COMPLAINT QUOTE
"I am a bad skin  because of my anxiety. I have a skin infection to my left leg". Started taking keflex yesterday. Hx of past cellulitic infections and Breast CA on antibody treatment. Endorses fever x1 week. Took Tylenol before ED arrival.

## 2023-09-15 NOTE — ED PROVIDER NOTE - OBJECTIVE STATEMENT
73 F pmh breast CA on antibody infusions, WILLY, ADHD, hypothyroid, predm, afib not on AC, chronic LLE wounds p/w pain/swelling to LLE x one week.  pt reports chronic wounds to LEs L>R from picking at skin.  has wound care RN that comes every Monday/Friday but due to appt conflict was last changed yesterday.  has noted the LLE has been more red/swollen than normal.  + fever 101 yesterday,  took tylenol.  afebrile today.  was prescribed keflex which she started yesterday.  denies HA, dizziness, fainting, uri sxs, cough, sob, abd pain, nvd, urinary sxs, numbness/weakness, injuries or other concerns

## 2023-09-15 NOTE — ED PROVIDER NOTE - NSFOLLOWUPINSTRUCTIONS_ED_ALL_ED_FT
Please rest and remain well hydrated with plenty of fluids.  You can take motrin 600-800mg and tylenol 650mg every 3 hours, switching between the two for pain/bodyaches or fevers (>100.4F/>38C)    Please take full course of antibiotics as prescribed- continue keflex as prescribed and also start taking bactrim    Please call to arrange follow up with primary care doctor within one week  Continue with dressing changes     Cellulitis    Cellulitis is a skin infection caused by bacteria. This condition occurs most often in the arms and lower legs but can occur anywhere over the body. Symptoms include redness, swelling, warm skin, tenderness, and chills/fever. If you were prescribed an antibiotic medicine, take it as told by your health care provider. Do not stop taking the antibiotic even if you start to feel better.    SEEK IMMEDIATE MEDICAL CARE IF YOU HAVE ANY OF THE FOLLOWING SYMPTOMS: worsening fever, red streaks coming from affected area, vomiting or diarrhea, or dizziness/lightheadedness.

## 2023-09-15 NOTE — ED PROVIDER NOTE - PHYSICAL EXAMINATION
Vitals reviewed  Gen: comfortable appearing, nad, speaking in full sentences  Skin: wwp, no rash/lesions  HEENT: ncat, eomi, mmm  CV: rrr, no audible m/r/g  Resp: symmetrical expansion, ctab, no w/r/r  Abd: nondistended, soft/nt  LLE: multiple chronic superficial ulcerative wounds w/ granulation tissue over anterior shin, no purulent dc or induration or crepitus, localized erythema from inferior patella down to ankle over anterior aspect, FROM all joints, faint palpable DP/PT pulse, cap refill < 2 sec, SILT  RLE: superficial scabbed over wound to shin, no erythema or streaking, NVI and FROM all joints   Neuro: alert/oriented, no focal deficits, steady gait w/ walker

## 2023-09-15 NOTE — ED PROVIDER NOTE - PATIENT PORTAL LINK FT
You can access the FollowMyHealth Patient Portal offered by [facility] by registering at the following website: [email or web address]. By joining [facility]’s FollowMyHealth portal, you will also be able to view your health information using other applications (apps) compatible with our system.

## 2023-09-15 NOTE — ED ADULT TRIAGE NOTE - CHIEF COMPLAINT QUOTE
"I am a bad skin  because of my anxiety. I have a skin infection". I started taking keflex yesterday. Hx of past cellulitic infections and Breast CA on antibody treatment. "I am a bad skin  because of my anxiety. I have a skin infection to my left leg". Started taking keflex yesterday. Hx of past cellulitic infections and Breast CA on antibody treatment. Endorses fever x1 week. Took Tylenol before ED arrival.

## 2023-09-15 NOTE — ED PROVIDER NOTE - PROGRESS NOTE DETAILS
labs w/ known anemia, no leukocytosis, normal lactate.  cxr no i/e.  ua no infection.  pt is afebrile, only on keflex 1 day.  will add bactrim, continue keflex and have continue with [facility] wound care and f/u with pmd.

## 2023-09-15 NOTE — ED ADULT NURSE NOTE - OBJECTIVE STATEMENT
73 F/ Pmhx of  breast  cancer on antibody  treatment , IBS , anxiety, chronic  leg leg wound  endorsing fever since yesterday, left leg pain radiates to thing .Per patient " I am a bad skin  ". 73 F/ Pmhx of  breast  cancer on antibody  treatment , IBS , anxiety, chronic  leg leg wound  endorsing fever since yesterday, left leg pain  .Per patient " I am a bad skin  ". noted multiple open skin and  with foul smelling drainage c/o left leg .  Afebrile  at ED and with VSS. patient

## 2024-02-26 NOTE — CHART NOTE - NSCHARTNOTESELECT_GEN_ALL_CORE
Population Health Chart Review & Patient Outreach Details    Outreach Performed: NO    Additional Pop Health Notes:           Updates Requested / Reviewed:      Updated Care Coordination Note         Health Maintenance Topics Overdue:    Health Maintenance Due   Topic Date Due    Hepatitis C Screening  Never done    Foot Exam  Never done    Eye Exam  Never done    Pneumococcal Vaccines (Age 65+) (2 of 2 - PCV) 01/21/2017    RSV Vaccine (Age 60+ and Pregnant patients) (1 - 1-dose 60+ series) Never done    Influenza Vaccine (1) 09/01/2023    COVID-19 Vaccine (4 - 2023-24 season) 09/01/2023         Health Maintenance Topic(s) Outreach Outcomes & Actions Taken:    Lab(s) - Outreach Outcomes & Actions Taken  : External Records Requested & Care Team Updated if Applicable      
Event Note

## 2024-05-13 NOTE — ED ADULT NURSE NOTE - NURSING NEURO LEVEL OF CONSCIOUSNESS
[FreeTextEntry1] : Pt reports she has a stress fracture in her foot.  Spouse was in the hospital recently for HTN.  She continues to work on boundaries and acceptance.  Pt able to hear how others are hearing criticism and deficits when she means concern.  Pt is aware of intergenerational patterns. 
alert and awake

## 2025-03-27 ENCOUNTER — NON-APPOINTMENT (OUTPATIENT)
Age: 75
End: 2025-03-27

## 2025-03-28 ENCOUNTER — APPOINTMENT (OUTPATIENT)
Dept: UROLOGY | Facility: CLINIC | Age: 75
End: 2025-03-28
Payer: MEDICARE

## 2025-03-28 ENCOUNTER — NON-APPOINTMENT (OUTPATIENT)
Age: 75
End: 2025-03-28

## 2025-03-28 VITALS
BODY MASS INDEX: 44.27 KG/M2 | SYSTOLIC BLOOD PRESSURE: 119 MMHG | HEART RATE: 90 BPM | HEIGHT: 63.5 IN | TEMPERATURE: 97.4 F | WEIGHT: 253 LBS | DIASTOLIC BLOOD PRESSURE: 73 MMHG

## 2025-03-28 DIAGNOSIS — N32.81 OVERACTIVE BLADDER: ICD-10-CM

## 2025-03-28 DIAGNOSIS — R35.0 FREQUENCY OF MICTURITION: ICD-10-CM

## 2025-03-28 DIAGNOSIS — Z80.0 FAMILY HISTORY OF MALIGNANT NEOPLASM OF DIGESTIVE ORGANS: ICD-10-CM

## 2025-03-28 PROCEDURE — 51798 US URINE CAPACITY MEASURE: CPT

## 2025-03-28 PROCEDURE — 99204 OFFICE O/P NEW MOD 45 MIN: CPT

## 2025-03-28 PROCEDURE — G2211 COMPLEX E/M VISIT ADD ON: CPT

## 2025-03-28 RX ORDER — METHYLPHENIDATE HYDROCHLORIDE 5 MG/1
TABLET ORAL
Refills: 0 | Status: ACTIVE | COMMUNITY

## 2025-03-28 RX ORDER — MIRTAZAPINE 7.5 MG/1
TABLET, FILM COATED ORAL
Refills: 0 | Status: ACTIVE | COMMUNITY

## 2025-03-31 ENCOUNTER — NON-APPOINTMENT (OUTPATIENT)
Age: 75
End: 2025-03-31

## 2025-03-31 LAB
APPEARANCE: CLEAR
BACTERIA UR CULT: NORMAL
BACTERIA: NEGATIVE /HPF
BILIRUBIN URINE: NEGATIVE
BLOOD URINE: NEGATIVE
CALCIUM OXALATE CRYSTALS: PRESENT
CAST: NORMAL /LPF
COLOR: YELLOW
EPITHELIAL CELLS: 5 /HPF
GLUCOSE QUALITATIVE U: NEGATIVE MG/DL
KETONES URINE: NEGATIVE MG/DL
LEUKOCYTE ESTERASE URINE: NEGATIVE
MICROSCOPIC-UA: NORMAL
NITRITE URINE: NEGATIVE
PH URINE: 5
PROTEIN URINE: NEGATIVE MG/DL
RED BLOOD CELLS URINE: NORMAL /HPF
REVIEW: NORMAL
SPECIFIC GRAVITY URINE: 1.02
UROBILINOGEN URINE: 0.2 MG/DL
WHITE BLOOD CELLS URINE: 1 /HPF

## 2025-04-29 RX ORDER — VIBEGRON 75 MG/1
75 TABLET, FILM COATED ORAL
Qty: 30 | Refills: 3 | Status: ACTIVE | COMMUNITY
Start: 2025-04-29 | End: 1900-01-01

## 2025-05-12 ENCOUNTER — APPOINTMENT (OUTPATIENT)
Dept: UROLOGY | Facility: CLINIC | Age: 75
End: 2025-05-12

## 2025-05-21 ENCOUNTER — EMERGENCY (EMERGENCY)
Facility: HOSPITAL | Age: 75
LOS: 1 days | End: 2025-05-21
Attending: STUDENT IN AN ORGANIZED HEALTH CARE EDUCATION/TRAINING PROGRAM | Admitting: STUDENT IN AN ORGANIZED HEALTH CARE EDUCATION/TRAINING PROGRAM
Payer: MEDICARE

## 2025-05-21 VITALS
SYSTOLIC BLOOD PRESSURE: 109 MMHG | HEART RATE: 80 BPM | DIASTOLIC BLOOD PRESSURE: 67 MMHG | RESPIRATION RATE: 18 BRPM | OXYGEN SATURATION: 97 % | WEIGHT: 160.06 LBS | TEMPERATURE: 98 F

## 2025-05-21 DIAGNOSIS — Z98.890 OTHER SPECIFIED POSTPROCEDURAL STATES: Chronic | ICD-10-CM

## 2025-05-21 DIAGNOSIS — Z96.651 PRESENCE OF RIGHT ARTIFICIAL KNEE JOINT: Chronic | ICD-10-CM

## 2025-05-21 LAB
ALBUMIN SERPL ELPH-MCNC: 3.9 G/DL — SIGNIFICANT CHANGE UP (ref 3.3–5)
ALP SERPL-CCNC: 113 U/L — SIGNIFICANT CHANGE UP (ref 40–120)
ALT FLD-CCNC: 12 U/L — SIGNIFICANT CHANGE UP (ref 10–45)
ANION GAP SERPL CALC-SCNC: 13 MMOL/L — SIGNIFICANT CHANGE UP (ref 5–17)
APTT BLD: 42.7 SEC — HIGH (ref 26.1–36.8)
AST SERPL-CCNC: 27 U/L — SIGNIFICANT CHANGE UP (ref 10–40)
BASOPHILS # BLD AUTO: 0.07 K/UL — SIGNIFICANT CHANGE UP (ref 0–0.2)
BASOPHILS NFR BLD AUTO: 0.9 % — SIGNIFICANT CHANGE UP (ref 0–2)
BILIRUB DIRECT SERPL-MCNC: 0.1 MG/DL — SIGNIFICANT CHANGE UP (ref 0–0.3)
BILIRUB INDIRECT FLD-MCNC: 0.2 MG/DL — SIGNIFICANT CHANGE UP (ref 0.2–1)
BILIRUB SERPL-MCNC: 0.3 MG/DL — SIGNIFICANT CHANGE UP (ref 0.2–1.2)
BLD GP AB SCN SERPL QL: POSITIVE — SIGNIFICANT CHANGE UP
BLD GP AB SCN SERPL QL: POSITIVE — SIGNIFICANT CHANGE UP
BUN SERPL-MCNC: 20 MG/DL — SIGNIFICANT CHANGE UP (ref 7–23)
CALCIUM SERPL-MCNC: 8.9 MG/DL — SIGNIFICANT CHANGE UP (ref 8.4–10.5)
CHLORIDE SERPL-SCNC: 107 MMOL/L — SIGNIFICANT CHANGE UP (ref 96–108)
CO2 SERPL-SCNC: 21 MMOL/L — LOW (ref 22–31)
CREAT SERPL-MCNC: 0.64 MG/DL — SIGNIFICANT CHANGE UP (ref 0.5–1.3)
EGFR: 93 ML/MIN/1.73M2 — SIGNIFICANT CHANGE UP
EGFR: 93 ML/MIN/1.73M2 — SIGNIFICANT CHANGE UP
EOSINOPHIL # BLD AUTO: 0.14 K/UL — SIGNIFICANT CHANGE UP (ref 0–0.5)
EOSINOPHIL NFR BLD AUTO: 1.8 % — SIGNIFICANT CHANGE UP (ref 0–6)
GLUCOSE SERPL-MCNC: 108 MG/DL — HIGH (ref 70–99)
HCT VFR BLD CALC: 24.5 % — LOW (ref 34.5–45)
HGB BLD-MCNC: 6.6 G/DL — CRITICAL LOW (ref 11.5–15.5)
INR BLD: 1.3 — HIGH (ref 0.85–1.16)
LYMPHOCYTES # BLD AUTO: 0.63 K/UL — LOW (ref 1–3.3)
LYMPHOCYTES # BLD AUTO: 8.1 % — LOW (ref 13–44)
MCHC RBC-ENTMCNC: 21.6 PG — LOW (ref 27–34)
MCHC RBC-ENTMCNC: 26.9 G/DL — LOW (ref 32–36)
MCV RBC AUTO: 80.3 FL — SIGNIFICANT CHANGE UP (ref 80–100)
MONOCYTES # BLD AUTO: 0.56 K/UL — SIGNIFICANT CHANGE UP (ref 0–0.9)
MONOCYTES NFR BLD AUTO: 7.2 % — SIGNIFICANT CHANGE UP (ref 2–14)
NEUTROPHILS # BLD AUTO: 6.4 K/UL — SIGNIFICANT CHANGE UP (ref 1.8–7.4)
NEUTROPHILS NFR BLD AUTO: 82 % — HIGH (ref 43–77)
PLATELET # BLD AUTO: 379 K/UL — SIGNIFICANT CHANGE UP (ref 150–400)
POTASSIUM SERPL-MCNC: 3.8 MMOL/L — SIGNIFICANT CHANGE UP (ref 3.5–5.3)
POTASSIUM SERPL-SCNC: 3.8 MMOL/L — SIGNIFICANT CHANGE UP (ref 3.5–5.3)
PROT SERPL-MCNC: 7.3 G/DL — SIGNIFICANT CHANGE UP (ref 6–8.3)
PROTHROM AB SERPL-ACNC: 14.9 SEC — HIGH (ref 9.9–13.4)
RBC # BLD: 3.05 M/UL — LOW (ref 3.8–5.2)
RBC # FLD: 22.7 % — HIGH (ref 10.3–14.5)
RH IG SCN BLD-IMP: POSITIVE — SIGNIFICANT CHANGE UP
RH IG SCN BLD-IMP: POSITIVE — SIGNIFICANT CHANGE UP
SODIUM SERPL-SCNC: 141 MMOL/L — SIGNIFICANT CHANGE UP (ref 135–145)
WBC # BLD: 7.8 K/UL — SIGNIFICANT CHANGE UP (ref 3.8–10.5)
WBC # FLD AUTO: 7.8 K/UL — SIGNIFICANT CHANGE UP (ref 3.8–10.5)

## 2025-05-21 PROCEDURE — 36000 PLACE NEEDLE IN VEIN: CPT

## 2025-05-21 PROCEDURE — 36415 COLL VENOUS BLD VENIPUNCTURE: CPT

## 2025-05-21 PROCEDURE — 99283 EMERGENCY DEPT VISIT LOW MDM: CPT | Mod: 25

## 2025-05-21 PROCEDURE — 86900 BLOOD TYPING SEROLOGIC ABO: CPT

## 2025-05-21 PROCEDURE — 86850 RBC ANTIBODY SCREEN: CPT

## 2025-05-21 PROCEDURE — 86901 BLOOD TYPING SEROLOGIC RH(D): CPT

## 2025-05-21 PROCEDURE — 80048 BASIC METABOLIC PNL TOTAL CA: CPT

## 2025-05-21 PROCEDURE — 85610 PROTHROMBIN TIME: CPT

## 2025-05-21 PROCEDURE — 86870 RBC ANTIBODY IDENTIFICATION: CPT

## 2025-05-21 PROCEDURE — 85730 THROMBOPLASTIN TIME PARTIAL: CPT

## 2025-05-21 PROCEDURE — 86880 COOMBS TEST DIRECT: CPT

## 2025-05-21 PROCEDURE — 80076 HEPATIC FUNCTION PANEL: CPT

## 2025-05-21 PROCEDURE — 86077 PHYS BLOOD BANK SERV XMATCH: CPT

## 2025-05-21 PROCEDURE — 85025 COMPLETE CBC W/AUTO DIFF WBC: CPT

## 2025-05-21 PROCEDURE — 99284 EMERGENCY DEPT VISIT MOD MDM: CPT

## 2025-05-23 DIAGNOSIS — Z53.29 PROCEDURE AND TREATMENT NOT CARRIED OUT BECAUSE OF PATIENT'S DECISION FOR OTHER REASONS: ICD-10-CM

## 2025-05-23 DIAGNOSIS — Z86.718 PERSONAL HISTORY OF OTHER VENOUS THROMBOSIS AND EMBOLISM: ICD-10-CM

## 2025-05-23 DIAGNOSIS — D50.9 IRON DEFICIENCY ANEMIA, UNSPECIFIED: ICD-10-CM

## 2025-05-23 DIAGNOSIS — D64.9 ANEMIA, UNSPECIFIED: ICD-10-CM

## 2025-05-23 DIAGNOSIS — I35.0 NONRHEUMATIC AORTIC (VALVE) STENOSIS: ICD-10-CM

## 2025-05-23 DIAGNOSIS — F32.2 MAJOR DEPRESSIVE DISORDER, SINGLE EPISODE, SEVERE WITHOUT PSYCHOTIC FEATURES: ICD-10-CM

## 2025-05-23 DIAGNOSIS — R73.03 PREDIABETES: ICD-10-CM

## 2025-05-23 DIAGNOSIS — I48.91 UNSPECIFIED ATRIAL FIBRILLATION: ICD-10-CM

## 2025-05-23 DIAGNOSIS — Z91.018 ALLERGY TO OTHER FOODS: ICD-10-CM

## 2025-05-23 DIAGNOSIS — L03.116 CELLULITIS OF LEFT LOWER LIMB: ICD-10-CM

## 2025-05-23 DIAGNOSIS — F90.9 ATTENTION-DEFICIT HYPERACTIVITY DISORDER, UNSPECIFIED TYPE: ICD-10-CM

## 2025-05-23 DIAGNOSIS — E03.9 HYPOTHYROIDISM, UNSPECIFIED: ICD-10-CM

## 2025-06-24 ENCOUNTER — APPOINTMENT (OUTPATIENT)
Dept: UROLOGY | Facility: CLINIC | Age: 75
End: 2025-06-24

## 2025-09-02 ENCOUNTER — NON-APPOINTMENT (OUTPATIENT)
Age: 75
End: 2025-09-02